# Patient Record
Sex: FEMALE | Race: WHITE | NOT HISPANIC OR LATINO | Employment: FULL TIME | URBAN - METROPOLITAN AREA
[De-identification: names, ages, dates, MRNs, and addresses within clinical notes are randomized per-mention and may not be internally consistent; named-entity substitution may affect disease eponyms.]

---

## 2018-08-15 ENCOUNTER — TELEPHONE (OUTPATIENT)
Dept: OBSTETRICS AND GYNECOLOGY | Facility: CLINIC | Age: 33
End: 2018-08-15

## 2018-08-15 NOTE — TELEPHONE ENCOUNTER
----- Message from Cynthia Garner MA sent at 8/15/2018  3:19 PM CDT -----  Contact: sima Amador  MRN: [id number]  Home Phone      [phone]  Work Phone      Not on file.  Mobile          [phone]    Patient Care Team:  Go Corrigan MD as PCP - General (Family Medicine)  OB? Yes, Unknown  What phone number can you be reached at?   [phone]  Message:   Stated is currently 6 weeks pregnant and is starting to have cramping and spotting today.

## 2018-08-15 NOTE — TELEPHONE ENCOUNTER
Pt called stating she is approximately 6 weeks pregnant having some mild cramping, and when she wipes there is a brownish discharge. Pt was advised to hydrate, rest, and take Tylenol if needed. Pt has appt scheduled for tomorrow

## 2018-08-16 ENCOUNTER — LAB VISIT (OUTPATIENT)
Dept: LAB | Facility: HOSPITAL | Age: 33
End: 2018-08-16
Attending: OBSTETRICS & GYNECOLOGY
Payer: COMMERCIAL

## 2018-08-16 ENCOUNTER — PROCEDURE VISIT (OUTPATIENT)
Dept: OBSTETRICS AND GYNECOLOGY | Facility: CLINIC | Age: 33
End: 2018-08-16
Payer: COMMERCIAL

## 2018-08-16 ENCOUNTER — OFFICE VISIT (OUTPATIENT)
Dept: OBSTETRICS AND GYNECOLOGY | Facility: CLINIC | Age: 33
End: 2018-08-16
Payer: COMMERCIAL

## 2018-08-16 DIAGNOSIS — N91.2 AMENORRHEA: Primary | ICD-10-CM

## 2018-08-16 DIAGNOSIS — E07.9 THYROID DISEASE: ICD-10-CM

## 2018-08-16 DIAGNOSIS — N91.2 AMENORRHEA: ICD-10-CM

## 2018-08-16 DIAGNOSIS — Z12.4 CERVICAL CANCER SCREENING: ICD-10-CM

## 2018-08-16 DIAGNOSIS — Z32.01 POSITIVE PREGNANCY TEST: ICD-10-CM

## 2018-08-16 DIAGNOSIS — Z11.3 SCREEN FOR STD (SEXUALLY TRANSMITTED DISEASE): ICD-10-CM

## 2018-08-16 LAB
ABO + RH BLD: NORMAL
ALBUMIN SERPL BCP-MCNC: 3.9 G/DL
ALP SERPL-CCNC: 83 U/L
ALT SERPL W/O P-5'-P-CCNC: 34 U/L
ANION GAP SERPL CALC-SCNC: 9 MMOL/L
AST SERPL-CCNC: 20 U/L
B-HCG UR QL: POSITIVE
BASOPHILS # BLD AUTO: 0.08 K/UL
BASOPHILS NFR BLD: 0.8 %
BILIRUB SERPL-MCNC: 0.4 MG/DL
BLD GP AB SCN CELLS X3 SERPL QL: NORMAL
BUN SERPL-MCNC: 9 MG/DL
CALCIUM SERPL-MCNC: 9.7 MG/DL
CHLORIDE SERPL-SCNC: 108 MMOL/L
CO2 SERPL-SCNC: 21 MMOL/L
CREAT SERPL-MCNC: 0.7 MG/DL
CTP QC/QA: YES
DIFFERENTIAL METHOD: NORMAL
EOSINOPHIL # BLD AUTO: 0.2 K/UL
EOSINOPHIL NFR BLD: 1.7 %
ERYTHROCYTE [DISTWIDTH] IN BLOOD BY AUTOMATED COUNT: 13.9 %
EST. GFR  (AFRICAN AMERICAN): >60 ML/MIN/1.73 M^2
EST. GFR  (NON AFRICAN AMERICAN): >60 ML/MIN/1.73 M^2
GLUCOSE SERPL-MCNC: 92 MG/DL
HCG INTACT+B SERPL-ACNC: NORMAL MIU/ML
HCT VFR BLD AUTO: 41.3 %
HGB BLD-MCNC: 13.7 G/DL
LYMPHOCYTES # BLD AUTO: 2.3 K/UL
LYMPHOCYTES NFR BLD: 23.3 %
MCH RBC QN AUTO: 29.1 PG
MCHC RBC AUTO-ENTMCNC: 33.2 G/DL
MCV RBC AUTO: 88 FL
MONOCYTES # BLD AUTO: 0.7 K/UL
MONOCYTES NFR BLD: 6.6 %
NEUTROPHILS # BLD AUTO: 6.7 K/UL
NEUTROPHILS NFR BLD: 67.6 %
PLATELET # BLD AUTO: 274 K/UL
PMV BLD AUTO: 11.9 FL
POTASSIUM SERPL-SCNC: 4 MMOL/L
PROT SERPL-MCNC: 7.4 G/DL
RBC # BLD AUTO: 4.71 M/UL
SODIUM SERPL-SCNC: 138 MMOL/L
T4 FREE SERPL-MCNC: 0.79 NG/DL
TSH SERPL DL<=0.005 MIU/L-ACNC: 11.91 UIU/ML
WBC # BLD AUTO: 9.92 K/UL

## 2018-08-16 PROCEDURE — 86850 RBC ANTIBODY SCREEN: CPT

## 2018-08-16 PROCEDURE — 85025 COMPLETE CBC W/AUTO DIFF WBC: CPT

## 2018-08-16 PROCEDURE — 87624 HPV HI-RISK TYP POOLED RSLT: CPT

## 2018-08-16 PROCEDURE — 81025 URINE PREGNANCY TEST: CPT | Mod: S$GLB,,, | Performed by: OBSTETRICS & GYNECOLOGY

## 2018-08-16 PROCEDURE — 99204 OFFICE O/P NEW MOD 45 MIN: CPT | Mod: 25,S$GLB,, | Performed by: OBSTETRICS & GYNECOLOGY

## 2018-08-16 PROCEDURE — 86592 SYPHILIS TEST NON-TREP QUAL: CPT

## 2018-08-16 PROCEDURE — 84702 CHORIONIC GONADOTROPIN TEST: CPT

## 2018-08-16 PROCEDURE — 81220 CFTR GENE COM VARIANTS: CPT

## 2018-08-16 PROCEDURE — 86703 HIV-1/HIV-2 1 RESULT ANTBDY: CPT

## 2018-08-16 PROCEDURE — 99999 PR PBB SHADOW E&M-EST. PATIENT-LVL I: CPT | Mod: PBBFAC,,, | Performed by: OBSTETRICS & GYNECOLOGY

## 2018-08-16 PROCEDURE — 87340 HEPATITIS B SURFACE AG IA: CPT

## 2018-08-16 PROCEDURE — 84439 ASSAY OF FREE THYROXINE: CPT

## 2018-08-16 PROCEDURE — 76817 TRANSVAGINAL US OBSTETRIC: CPT | Mod: S$GLB,,, | Performed by: OBSTETRICS & GYNECOLOGY

## 2018-08-16 PROCEDURE — 36415 COLL VENOUS BLD VENIPUNCTURE: CPT

## 2018-08-16 PROCEDURE — 87491 CHLMYD TRACH DNA AMP PROBE: CPT

## 2018-08-16 PROCEDURE — 80053 COMPREHEN METABOLIC PANEL: CPT

## 2018-08-16 PROCEDURE — 88175 CYTOPATH C/V AUTO FLUID REDO: CPT

## 2018-08-16 PROCEDURE — 84443 ASSAY THYROID STIM HORMONE: CPT

## 2018-08-16 PROCEDURE — 86762 RUBELLA ANTIBODY: CPT

## 2018-08-16 RX ORDER — THYROID, PORCINE 90 MG/1
TABLET ORAL
COMMUNITY
Start: 2018-05-17 | End: 2018-11-08

## 2018-08-16 NOTE — PROGRESS NOTES
Subjective:   Patient ID: Deneen Amador is a 32 y.o. y.o. female.     Chief Complaint: Missed Menses       History of Present Illness:    Deneen presents today complaining of amenorrhea. Patient's last menstrual period was 2018.. Prior menstrual cycles have been regular. She reports no other symptoms. UPT is positive.     Patient reports first pregnancy was  with baby measuring 9 pounds 11 ounces and caused pelvic floor issues. Secondary pregnancy primary CD weighing 9 pounds 8 ounces. Patient reports she has lichen sclerosis and has issues with her episiotomy are now. She also reports she has psoriasis and is concerned about her flares around her CS scar.     She has thyroid disease but is currently on no medication.     History reviewed. No pertinent past medical history.  Past Surgical History:   Procedure Laterality Date     SECTION      x1    THYROIDECTOMY, PARTIAL       Social History     Socioeconomic History    Marital status:      Spouse name: None    Number of children: None    Years of education: None    Highest education level: None   Social Needs    Financial resource strain: None    Food insecurity - worry: None    Food insecurity - inability: None    Transportation needs - medical: None    Transportation needs - non-medical: None   Occupational History    None   Tobacco Use    Smoking status: Never Smoker    Smokeless tobacco: Never Used   Substance and Sexual Activity    Alcohol use: No     Frequency: Never    Drug use: No    Sexual activity: Yes     Partners: Male     Birth control/protection: None     Comment:    Other Topics Concern    None   Social History Narrative    None     Family History   Problem Relation Age of Onset    Breast cancer Neg Hx     Colon cancer Neg Hx     Ovarian cancer Neg Hx      OB History    Para Term  AB Living   2 2 2     2   SAB TAB Ectopic Multiple Live Births           2      # Outcome Date GA Lbr  Tru/2nd Weight Sex Delivery Anes PTL Lv   2 Term      CS-LTranv   JASIEL   1 Term      Vag-Spont   JASIEL            ROS:   Constitutional: Negative for chills, fever and weight loss.   HENT: Negative for congestion, ear discharge, hearing loss and nosebleeds.   Eyes: Negative for blurred vision and double vision.   Respiratory: Negative for cough, hemoptysis, sputum production and shortness of breath.   Cardiovascular: Negative for chest pain, palpitations and orthopnea.   Gastrointestinal: Negative for abdominal pain, heartburn, nausea and vomiting.   Genitourinary: Negative for dysuria, frequency, hematuria and urgency.   Musculoskeletal: Negative for back pain, myalgias and neck pain.   Skin: Negative for itching and rash.   Neurological: Negative for dizziness, tingling, tremors and headaches.   Endo/Heme/Allergies: Negative for environmental allergies and polydipsia. Does not bruise/bleed easily.   Psychiatric/Behavioral: Negative for depression, hallucinations and substance abuse. The patient is not nervous/anxious.       Objective:   Vital Signs:  There were no vitals filed for this visit.      Physical Exam:  General:  alert, cooperative, appears stated age   Skin:  Skin color, texture, turgor normal. No rashes or lesions   HEENT:  conjunctivae/corneas clear. PERRL.   Neck: supple, trachea midline, no adenopathy or thyromegally   Respiratory:  clear to auscultation bilaterally   Heart:  regular rate and rhythm, S1, S2 normal, no murmur, click, rub or gallop   Breasts:  no discharge, erythema, or tenderness   Abdomen:  normal findings: bowel sounds normal, no masses palpable, no organomegaly and soft, non-tender   Pelvis: External genitalia: normal general appearance  Urinary system: urethral meatus normal, bladder nontender  Vaginal: normal mucosa without prolapse or lesions  Cervix: normal appearance  Uterus: normal size, shape, position  Adnexa: normal size, nontender bilaterally   Extremities: Normal ROM; no  edema, no cyanosis   Neurologial: Normal strength and tone. No focal numbness or weakness. Reflexes 2+ and equal.   Psychiatric: normal mood, speech, dress, and thought processes     Assessment:      1. Amenorrhea          Plan:      Amenorrhea  -     POCT urine pregnancy  -     hCG, quantitative; Future; Expected date: 08/16/2018  -      OB/GYN Procedure ([facility]) - Extended List - Future; Future    Positive pregnancy test  -     Comprehensive metabolic panel; Future; Expected date: 08/16/2018  -     Type & Screen - Ob Profile; Future; Expected date: 08/16/2018  -     CBC auto differential; Future; Expected date: 08/16/2018  -     HIV-1 and HIV-2 antibodies; Future; Expected date: 08/16/2018  -     Hepatitis B surface antigen; Future; Expected date: 08/16/2018  -     Rubella antibody, IgG; Future; Expected date: 08/16/2018  -     RPR; Future; Expected date: 08/16/2018  -     Cystic Fibrosis Mutation Panel; Future; Expected date: 08/16/2018    Screen for STD (sexually transmitted disease)  -     C. trachomatis/N. gonorrhoeae by AMP DNA    Cervical cancer screening  -     Liquid-based pap smear, screening  -     HPV High Risk Genotypes, PCR    Thyroid disease  -     TSH; Future; Expected date: 08/16/2018      Dating scan  Labs  Pap/HPV cotesting  GC/CT  RTC in 4 weeks    Patient was counseled today on proper weight gain based on the [employer]'s recommendations based on her pre-pregnancy weight. Discussed foods to avoid in pregnancy (i.e. sushi, fish that are high in mercury, deli meat, and unpasteurized cheeses). Discussed prenatal vitamin options (i.e. stool softener, DHA). She was also counseled on safe, healthy behavior as well as medications safe in pregnancy.

## 2018-08-17 PROBLEM — O34.10 UTERINE FIBROID IN PREGNANCY: Status: ACTIVE | Noted: 2018-08-17

## 2018-08-17 PROBLEM — D25.9 UTERINE FIBROID IN PREGNANCY: Status: ACTIVE | Noted: 2018-08-17

## 2018-08-17 LAB
HBV SURFACE AG SERPL QL IA: NEGATIVE
HIV 1+2 AB+HIV1 P24 AG SERPL QL IA: NEGATIVE
RPR SER QL: NORMAL
RUBV IGG SER-ACNC: 19 IU/ML
RUBV IGG SER-IMP: REACTIVE

## 2018-08-19 LAB
C TRACH DNA SPEC QL NAA+PROBE: NOT DETECTED
N GONORRHOEA DNA SPEC QL NAA+PROBE: NOT DETECTED

## 2018-08-20 ENCOUNTER — TELEPHONE (OUTPATIENT)
Dept: OBSTETRICS AND GYNECOLOGY | Facility: CLINIC | Age: 33
End: 2018-08-20

## 2018-08-20 RX ORDER — NITROFURANTOIN 25; 75 MG/1; MG/1
100 CAPSULE ORAL 2 TIMES DAILY
Qty: 14 CAPSULE | Refills: 0 | Status: SHIPPED | OUTPATIENT
Start: 2018-08-20 | End: 2018-08-27

## 2018-08-20 NOTE — TELEPHONE ENCOUNTER
----- Message from Cynthia Garner MA sent at 8/20/2018  8:09 AM CDT -----  Contact: sima Amador  MRN: [id number]  Home Phone      [phone]  Work Phone      Not on file.  Mobile          [phone]    Patient Care Team:  Go Corrigan MD as PCP - General (Family Medicine)  OB? Yes   What phone number can you be reached at?   [phone]  Message:   Needs to discuss uti she has.

## 2018-08-20 NOTE — TELEPHONE ENCOUNTER
Rx for macrobid sent to patient's pharmacy.  Advise her to take that and not AZO.  Also, she should increase her PO hydration.

## 2018-08-20 NOTE — TELEPHONE ENCOUNTER
Pt stated she started with UTI symptoms today. C/O frequency, pressure, and odor. Pt would like to know if she can use Azo or if she could get something sent to the pharmacy. Pt is approximately 6wks

## 2018-08-21 LAB — CFTR MUT ANL BLD/T: NORMAL

## 2018-08-23 LAB
HPV HR 12 DNA CVX QL NAA+PROBE: NEGATIVE
HPV16 AG SPEC QL: NEGATIVE
HPV18 DNA SPEC QL NAA+PROBE: NEGATIVE

## 2018-09-13 ENCOUNTER — INITIAL PRENATAL (OUTPATIENT)
Dept: OBSTETRICS AND GYNECOLOGY | Facility: CLINIC | Age: 33
End: 2018-09-13
Payer: COMMERCIAL

## 2018-09-13 ENCOUNTER — TELEPHONE (OUTPATIENT)
Dept: OBSTETRICS AND GYNECOLOGY | Facility: CLINIC | Age: 33
End: 2018-09-13

## 2018-09-13 VITALS — SYSTOLIC BLOOD PRESSURE: 120 MMHG | WEIGHT: 283 LBS | HEART RATE: 80 BPM | DIASTOLIC BLOOD PRESSURE: 80 MMHG

## 2018-09-13 DIAGNOSIS — O34.10 UTERINE FIBROID IN PREGNANCY: ICD-10-CM

## 2018-09-13 DIAGNOSIS — Z34.81 ENCOUNTER FOR SUPERVISION OF OTHER NORMAL PREGNANCY IN FIRST TRIMESTER: Primary | ICD-10-CM

## 2018-09-13 DIAGNOSIS — Z98.891 HISTORY OF CESAREAN DELIVERY: ICD-10-CM

## 2018-09-13 DIAGNOSIS — Z12.31 ENCOUNTER FOR SCREENING MAMMOGRAM FOR BREAST CANCER: Primary | ICD-10-CM

## 2018-09-13 DIAGNOSIS — O09.521 AMA (ADVANCED MATERNAL AGE) MULTIGRAVIDA 35+, FIRST TRIMESTER: ICD-10-CM

## 2018-09-13 DIAGNOSIS — Z3A.10 10 WEEKS GESTATION OF PREGNANCY: ICD-10-CM

## 2018-09-13 DIAGNOSIS — D25.9 UTERINE FIBROID IN PREGNANCY: ICD-10-CM

## 2018-09-13 PROBLEM — Z34.91 ENCOUNTER FOR SUPERVISION OF NORMAL PREGNANCY IN FIRST TRIMESTER: Status: ACTIVE | Noted: 2018-09-13

## 2018-09-13 PROCEDURE — 0500F INITIAL PRENATAL CARE VISIT: CPT | Mod: S$GLB,,, | Performed by: OBSTETRICS & GYNECOLOGY

## 2018-09-13 PROCEDURE — 99999 PR PBB SHADOW E&M-EST. PATIENT-LVL II: CPT | Mod: PBBFAC,,, | Performed by: OBSTETRICS & GYNECOLOGY

## 2018-09-13 NOTE — PROGRESS NOTES
Reviewed prenatal labs with patient. Reviewed dating criteria. Discussed proper nutrition and weight gain in pregnancy. No vaginal bleeding or cramping noted. SAB precautions discussed. Patient to RTC in 4 weeks.     Discussed fibroid on ultrasound; will send to [facility] for anatomy scan. Discussed cffDNA for AMA status. Patient unsure at this time.     Initial ob packet supplied to patient. Contents supplied and discussed include medications safe in pregnancy, pregnancy A to Z, class schedule, pregnancy checklist, car seat safety, and handout on skin to skin and breastfeeding. Coeffective mychal card supplied and discussed.

## 2018-09-13 NOTE — TELEPHONE ENCOUNTER
----- Message from Cynthia Garner MA sent at 9/13/2018  9:29 AM CDT -----  Contact: sima Amador  MRN: [id number]  Home Phone      [phone]  Work Phone      Not on file.  Mobile          [phone]    Patient Care Team:  Go Corrigan MD as PCP - General (Family Medicine)  Josiane Mills MD (Obstetrics and Gynecology)  OB? No  What phone number can you be reached at?   [phone]  Message:   Please link mammo orders to appt 11/23/2018.  Thanks!

## 2018-10-11 ENCOUNTER — ROUTINE PRENATAL (OUTPATIENT)
Dept: OBSTETRICS AND GYNECOLOGY | Facility: CLINIC | Age: 33
End: 2018-10-11
Payer: COMMERCIAL

## 2018-10-11 ENCOUNTER — IMMUNIZATION (OUTPATIENT)
Dept: OBSTETRICS AND GYNECOLOGY | Facility: CLINIC | Age: 33
End: 2018-10-11
Payer: COMMERCIAL

## 2018-10-11 VITALS — WEIGHT: 286 LBS | HEART RATE: 80 BPM | DIASTOLIC BLOOD PRESSURE: 84 MMHG | SYSTOLIC BLOOD PRESSURE: 120 MMHG

## 2018-10-11 DIAGNOSIS — Z34.82 ENCOUNTER FOR SUPERVISION OF OTHER NORMAL PREGNANCY IN SECOND TRIMESTER: Primary | ICD-10-CM

## 2018-10-11 DIAGNOSIS — D25.9 UTERINE FIBROID IN PREGNANCY: ICD-10-CM

## 2018-10-11 DIAGNOSIS — O09.522 AMA (ADVANCED MATERNAL AGE) MULTIGRAVIDA 35+, SECOND TRIMESTER: ICD-10-CM

## 2018-10-11 DIAGNOSIS — Z3A.14 14 WEEKS GESTATION OF PREGNANCY: ICD-10-CM

## 2018-10-11 DIAGNOSIS — Z98.891 HISTORY OF CESAREAN DELIVERY: ICD-10-CM

## 2018-10-11 DIAGNOSIS — Z23 NEED FOR INFLUENZA VACCINATION: Primary | ICD-10-CM

## 2018-10-11 DIAGNOSIS — O34.10 UTERINE FIBROID IN PREGNANCY: ICD-10-CM

## 2018-10-11 PROBLEM — Z34.92 ENCOUNTER FOR SUPERVISION OF NORMAL PREGNANCY IN SECOND TRIMESTER: Status: ACTIVE | Noted: 2018-09-13

## 2018-10-11 PROCEDURE — 0502F SUBSEQUENT PRENATAL CARE: CPT | Mod: S$GLB,,, | Performed by: OBSTETRICS & GYNECOLOGY

## 2018-10-11 PROCEDURE — 90686 IIV4 VACC NO PRSV 0.5 ML IM: CPT | Mod: S$GLB,,, | Performed by: OBSTETRICS & GYNECOLOGY

## 2018-10-11 PROCEDURE — 90471 IMMUNIZATION ADMIN: CPT | Mod: S$GLB,,, | Performed by: OBSTETRICS & GYNECOLOGY

## 2018-10-11 PROCEDURE — 99999 PR PBB SHADOW E&M-EST. PATIENT-LVL III: CPT | Mod: PBBFAC,,, | Performed by: OBSTETRICS & GYNECOLOGY

## 2018-10-11 RX ORDER — LEVOTHYROXINE SODIUM 50 UG/1
50 TABLET ORAL DAILY
Status: ON HOLD | COMMUNITY
End: 2019-03-29 | Stop reason: HOSPADM

## 2018-10-11 NOTE — PROGRESS NOTES
Flu Vaccine given in Left Deltoid per order. No reaction noted. Instructed patient to wait 15 minutes after injection administration. Patient verbalized understanding with no questions or concerns.

## 2018-10-11 NOTE — PROGRESS NOTES
Patient doing well. No vaginal bleeding or cramping noted. Discussed the need for an anatomy scan between 18-20 weeks; order placed with M. Desires cffDNA; ordered. Flu shot today.  RTC in 4 weeks.     Coffective counseling sheet Get Ready discussed with mother. Reinforced avoiding induction of labor unless medically indicated as well as comfort measures during labor.  Encouraged mother to download Coffective mobile mychal if she has not already done so. Mother verbalizes understanding.

## 2018-10-17 ENCOUNTER — TELEPHONE (OUTPATIENT)
Dept: OBSTETRICS AND GYNECOLOGY | Facility: CLINIC | Age: 33
End: 2018-10-17

## 2018-10-22 ENCOUNTER — TELEPHONE (OUTPATIENT)
Dept: OBSTETRICS AND GYNECOLOGY | Facility: CLINIC | Age: 33
End: 2018-10-22

## 2018-10-22 NOTE — TELEPHONE ENCOUNTER
----- Message from Marianela Casas sent at 10/22/2018  9:19 AM CDT -----  Contact: sima Amador  MRN: [id number]  Home Phone      [phone]  Work Phone      Not on file.  Mobile          [phone]    Patient Care Team:  Go Corrigan MD as PCP - General (Family Medicine)  Josiane Mills MD (Obstetrics and Gynecology)  OB? Yes, 15w5d  What phone number can you be reached at? [phone]   Message:   Patient would like her daughter Ankita to be called @ [phone] regarding her baby's gender, she states they are doing a gender reveal. Please return call.

## 2018-10-22 NOTE — TELEPHONE ENCOUNTER
Attempted to contact Ankita per pts request to notify of baby's gender. No answer, unable to leave message.

## 2018-11-08 ENCOUNTER — LAB VISIT (OUTPATIENT)
Dept: LAB | Facility: HOSPITAL | Age: 33
End: 2018-11-08
Attending: OBSTETRICS & GYNECOLOGY
Payer: COMMERCIAL

## 2018-11-08 ENCOUNTER — ROUTINE PRENATAL (OUTPATIENT)
Dept: OBSTETRICS AND GYNECOLOGY | Facility: CLINIC | Age: 33
End: 2018-11-08
Payer: COMMERCIAL

## 2018-11-08 VITALS — DIASTOLIC BLOOD PRESSURE: 76 MMHG | SYSTOLIC BLOOD PRESSURE: 121 MMHG | WEIGHT: 286 LBS | HEART RATE: 84 BPM

## 2018-11-08 DIAGNOSIS — Z98.891 HISTORY OF CESAREAN DELIVERY: ICD-10-CM

## 2018-11-08 DIAGNOSIS — Z34.82 ENCOUNTER FOR SUPERVISION OF OTHER NORMAL PREGNANCY IN SECOND TRIMESTER: ICD-10-CM

## 2018-11-08 DIAGNOSIS — O34.10 UTERINE FIBROID IN PREGNANCY: ICD-10-CM

## 2018-11-08 DIAGNOSIS — O09.522 AMA (ADVANCED MATERNAL AGE) MULTIGRAVIDA 35+, SECOND TRIMESTER: ICD-10-CM

## 2018-11-08 DIAGNOSIS — Z34.82 ENCOUNTER FOR SUPERVISION OF OTHER NORMAL PREGNANCY IN SECOND TRIMESTER: Primary | ICD-10-CM

## 2018-11-08 DIAGNOSIS — D25.9 UTERINE FIBROID IN PREGNANCY: ICD-10-CM

## 2018-11-08 PROCEDURE — 82105 ALPHA-FETOPROTEIN SERUM: CPT

## 2018-11-08 PROCEDURE — 0502F SUBSEQUENT PRENATAL CARE: CPT | Mod: S$GLB,,, | Performed by: OBSTETRICS & GYNECOLOGY

## 2018-11-08 PROCEDURE — 36415 COLL VENOUS BLD VENIPUNCTURE: CPT

## 2018-11-08 PROCEDURE — 99999 PR PBB SHADOW E&M-EST. PATIENT-LVL II: CPT | Mod: PBBFAC,,, | Performed by: OBSTETRICS & GYNECOLOGY

## 2018-11-08 NOTE — PROGRESS NOTES
Patient with no complaints. Denies vaginal bleeding or cramping.  Patient to get AFP drawn today. Anatomy scan scheduled with [facility] on 11/14. RTC in 4 weeks    Coffective counseling sheet Fall In Love discussed with mother. Reinforced immediate skin to skin, the magic first hour, importance of the first feeding and delaying routine procedures. Encouraged mother to download Coffective mobile mychal if she has not already done so. Mother verbalizes understanding.

## 2018-11-12 LAB
# FETUSES US: NORMAL
AFP INTERPRETATION: NORMAL
AFP MOM SERPL: 1.09
AFP SERPL-MCNC: 31.8 NG/ML
AFP SERPL-MCNC: NEGATIVE NG/ML
AGE AT DELIVERY: 35
GA (DAYS): 1 D
GA (WEEKS): 18 WK
GA METHOD: NORMAL
IDDM PATIENT QL: NORMAL
SMOKING STATUS FTND: NO

## 2018-11-14 ENCOUNTER — INITIAL CONSULT (OUTPATIENT)
Dept: MATERNAL FETAL MEDICINE | Facility: CLINIC | Age: 33
End: 2018-11-14
Attending: OBSTETRICS & GYNECOLOGY
Payer: COMMERCIAL

## 2018-11-14 ENCOUNTER — PROCEDURE VISIT (OUTPATIENT)
Dept: MATERNAL FETAL MEDICINE | Facility: CLINIC | Age: 33
End: 2018-11-14
Payer: COMMERCIAL

## 2018-11-14 VITALS
SYSTOLIC BLOOD PRESSURE: 118 MMHG | HEIGHT: 62 IN | DIASTOLIC BLOOD PRESSURE: 72 MMHG | BODY MASS INDEX: 52.62 KG/M2 | WEIGHT: 285.94 LBS

## 2018-11-14 DIAGNOSIS — Z98.891 HISTORY OF CESAREAN DELIVERY: ICD-10-CM

## 2018-11-14 DIAGNOSIS — O34.10 UTERINE FIBROID IN PREGNANCY: ICD-10-CM

## 2018-11-14 DIAGNOSIS — Z36.3 ANTENATAL SCREENING FOR MALFORMATION USING ULTRASONICS: ICD-10-CM

## 2018-11-14 DIAGNOSIS — Z36.89 ENCOUNTER FOR ULTRASOUND TO CHECK FETAL GROWTH: Primary | ICD-10-CM

## 2018-11-14 DIAGNOSIS — O09.522 AMA (ADVANCED MATERNAL AGE) MULTIGRAVIDA 35+, SECOND TRIMESTER: ICD-10-CM

## 2018-11-14 DIAGNOSIS — D25.9 UTERINE FIBROID IN PREGNANCY: ICD-10-CM

## 2018-11-14 PROCEDURE — 99999 PR PBB SHADOW E&M-EST. PATIENT-LVL III: CPT | Mod: PBBFAC,,, | Performed by: OBSTETRICS & GYNECOLOGY

## 2018-11-14 PROCEDURE — 99202 OFFICE O/P NEW SF 15 MIN: CPT | Mod: 25,S$GLB,, | Performed by: OBSTETRICS & GYNECOLOGY

## 2018-11-14 PROCEDURE — 3008F BODY MASS INDEX DOCD: CPT | Mod: CPTII,S$GLB,, | Performed by: OBSTETRICS & GYNECOLOGY

## 2018-11-14 PROCEDURE — 76811 OB US DETAILED SNGL FETUS: CPT | Mod: S$GLB,,, | Performed by: OBSTETRICS & GYNECOLOGY

## 2018-11-14 NOTE — LETTER
November 16, 2018      Josiane Mills MD  [address] [address]           [facility]  [address]  [facility] [address]  Phone: [phone]          Patient: Deneen Amador   MR Number: [id number]   YOB: 1985   Date of Visit: 11/14/2018       Dear Dr. Josiane Mills:    Thank you for referring Deneen Amador to me for evaluation. Attached you will find relevant portions of my assessment and plan of care.    If you have questions, please do not hesitate to call me. I look forward to following Deneen Amador along with you.    Sincerely,    Cynthia Sanders MD    Enclosure  CC:  No Recipients    If you would like to receive this communication electronically, please contact [email or web address] or [phone] to request more information on [employer] Link access.    For providers and/or their staff who would like to refer a patient to [facility], please contact us through our one-stop-shop provider referral line, [facility] [place], at [phone].    If you feel you have received this communication in error or would no longer like to receive these types of communications, please e-mail [email or web address]

## 2018-11-21 ENCOUNTER — TELEPHONE (OUTPATIENT)
Dept: OBSTETRICS AND GYNECOLOGY | Facility: CLINIC | Age: 33
End: 2018-11-21

## 2018-11-21 NOTE — TELEPHONE ENCOUNTER
20 week ob requesting OTC medication for a cough. States she has been experiencing a non-productive cough for 2 days. Denies productive cough, fever, nausea, vomiting, or any other symptoms. Patient instructed per A-Z pregnancy guide the following are safe: Vicks Nature Fusion, Delsym 12 hour extended release suspension, Coricidin HfPB chest congestion and cough, or Mucinex. Patient see PCP if symptoms persists or worsen. Patient verbalized understanding with no questions or concerns.

## 2018-11-21 NOTE — TELEPHONE ENCOUNTER
----- Message from Cynthia Garner MA sent at 11/21/2018  8:41 AM CST -----  Contact: self  Deneen Amador  MRN: [id number]  Home Phone      [phone]  Work Phone      Not on file.  Mobile          [phone]    Patient Care Team:  Go Corrigan MD as PCP - General (Family Medicine)  OB? Yes, 20w0d  What phone number can you be reached at?  [phone]  Message:   Would like to know what can she take for a cough while pregnant.

## 2018-12-06 ENCOUNTER — ROUTINE PRENATAL (OUTPATIENT)
Dept: OBSTETRICS AND GYNECOLOGY | Facility: CLINIC | Age: 33
End: 2018-12-06
Payer: COMMERCIAL

## 2018-12-06 ENCOUNTER — LAB VISIT (OUTPATIENT)
Dept: LAB | Facility: HOSPITAL | Age: 33
End: 2018-12-06
Attending: OBSTETRICS & GYNECOLOGY
Payer: COMMERCIAL

## 2018-12-06 VITALS
SYSTOLIC BLOOD PRESSURE: 118 MMHG | DIASTOLIC BLOOD PRESSURE: 80 MMHG | HEART RATE: 88 BPM | WEIGHT: 291 LBS | BODY MASS INDEX: 53.22 KG/M2

## 2018-12-06 DIAGNOSIS — O09.522 AMA (ADVANCED MATERNAL AGE) MULTIGRAVIDA 35+, SECOND TRIMESTER: ICD-10-CM

## 2018-12-06 DIAGNOSIS — Z3A.22 22 WEEKS GESTATION OF PREGNANCY: ICD-10-CM

## 2018-12-06 DIAGNOSIS — Z34.82 ENCOUNTER FOR SUPERVISION OF OTHER NORMAL PREGNANCY IN SECOND TRIMESTER: Primary | ICD-10-CM

## 2018-12-06 DIAGNOSIS — D25.9 UTERINE FIBROID IN PREGNANCY: ICD-10-CM

## 2018-12-06 DIAGNOSIS — Z13.1 ENCOUNTER FOR SCREENING FOR DIABETES MELLITUS: ICD-10-CM

## 2018-12-06 DIAGNOSIS — O34.10 UTERINE FIBROID IN PREGNANCY: ICD-10-CM

## 2018-12-06 DIAGNOSIS — Z98.891 HISTORY OF CESAREAN DELIVERY: ICD-10-CM

## 2018-12-06 LAB — GLUCOSE SERPL-MCNC: 93 MG/DL

## 2018-12-06 PROCEDURE — 99999 PR PBB SHADOW E&M-EST. PATIENT-LVL II: CPT | Mod: PBBFAC,,, | Performed by: OBSTETRICS & GYNECOLOGY

## 2018-12-06 PROCEDURE — 36415 COLL VENOUS BLD VENIPUNCTURE: CPT

## 2018-12-06 PROCEDURE — 82950 GLUCOSE TEST: CPT

## 2018-12-06 PROCEDURE — 0502F SUBSEQUENT PRENATAL CARE: CPT | Mod: S$GLB,,, | Performed by: OBSTETRICS & GYNECOLOGY

## 2018-12-06 NOTE — PROGRESS NOTES
Patient with no complaints. Denies vaginal bleeding or cramping.  Good FM. Discussed with patient having glucose testing for gestational diabetes; will order today. RTC in 4 weeks.     Coffective counseling sheet Learn Your Baby and Protect Breastfeeding discussed with mother. Instructed regarding feeding cues and methods to calm baby. Encouraged mother to download Coffective mobile mychal if she has not already done so.  Mother verbalized understanding.

## 2018-12-19 ENCOUNTER — INITIAL CONSULT (OUTPATIENT)
Dept: MATERNAL FETAL MEDICINE | Facility: CLINIC | Age: 33
End: 2018-12-19
Attending: OBSTETRICS & GYNECOLOGY
Payer: COMMERCIAL

## 2018-12-19 ENCOUNTER — PROCEDURE VISIT (OUTPATIENT)
Dept: MATERNAL FETAL MEDICINE | Facility: CLINIC | Age: 33
End: 2018-12-19
Payer: COMMERCIAL

## 2018-12-19 VITALS
SYSTOLIC BLOOD PRESSURE: 118 MMHG | BODY MASS INDEX: 53.43 KG/M2 | WEIGHT: 292.13 LBS | DIASTOLIC BLOOD PRESSURE: 88 MMHG

## 2018-12-19 DIAGNOSIS — O09.522 AMA (ADVANCED MATERNAL AGE) MULTIGRAVIDA 35+, SECOND TRIMESTER: ICD-10-CM

## 2018-12-19 DIAGNOSIS — Z36.89 ENCOUNTER FOR ULTRASOUND TO CHECK FETAL GROWTH: ICD-10-CM

## 2018-12-19 DIAGNOSIS — D25.9 UTERINE FIBROID IN PREGNANCY: ICD-10-CM

## 2018-12-19 DIAGNOSIS — O99.891 CURRENT MATERNAL CONDITION AFFECTING PREGNANCY: ICD-10-CM

## 2018-12-19 DIAGNOSIS — Z36.3 ANTENATAL SCREENING FOR MALFORMATION USING ULTRASONICS: ICD-10-CM

## 2018-12-19 DIAGNOSIS — O34.10 UTERINE FIBROID IN PREGNANCY: ICD-10-CM

## 2018-12-19 PROCEDURE — 99499 UNLISTED E&M SERVICE: CPT | Mod: S$GLB,,, | Performed by: OBSTETRICS & GYNECOLOGY

## 2018-12-19 PROCEDURE — 99999 PR PBB SHADOW E&M-EST. PATIENT-LVL II: CPT | Mod: PBBFAC,,, | Performed by: OBSTETRICS & GYNECOLOGY

## 2018-12-19 PROCEDURE — 76816 OB US FOLLOW-UP PER FETUS: CPT | Mod: S$GLB,,, | Performed by: OBSTETRICS & GYNECOLOGY

## 2018-12-19 NOTE — LETTER
December 21, 2018      Josiane Mills MD  [address] [address]           [facility]  [address]  [facility] [address]  Phone: [phone]          Patient: Deneen Amador   MR Number: [id number]   YOB: 1985   Date of Visit: 12/19/2018       Dear Dr. Josiane Mills:    Thank you for referring Deneen Amador to me for evaluation. Attached you will find relevant portions of my assessment and plan of care.    If you have questions, please do not hesitate to call me. I look forward to following Deneen Amador along with you.    Sincerely,    Cynthia Sanders MD    Enclosure  CC:  No Recipients    If you would like to receive this communication electronically, please contact [email or web address] or [phone] to request more information on [employer] Link access.    For providers and/or their staff who would like to refer a patient to [facility], please contact us through our one-stop-shop provider referral line, [facility], at [phone].    If you feel you have received this communication in error or would no longer like to receive these types of communications, please e-mail [email or web address]

## 2019-01-02 ENCOUNTER — TELEPHONE (OUTPATIENT)
Dept: OBSTETRICS AND GYNECOLOGY | Facility: CLINIC | Age: 34
End: 2019-01-02

## 2019-01-02 ENCOUNTER — TELEPHONE (OUTPATIENT)
Dept: PEDIATRIC CARDIOLOGY | Facility: CLINIC | Age: 34
End: 2019-01-02

## 2019-01-02 ENCOUNTER — ROUTINE PRENATAL (OUTPATIENT)
Dept: OBSTETRICS AND GYNECOLOGY | Facility: CLINIC | Age: 34
End: 2019-01-02
Payer: COMMERCIAL

## 2019-01-02 VITALS
WEIGHT: 293 LBS | BODY MASS INDEX: 53.96 KG/M2 | SYSTOLIC BLOOD PRESSURE: 116 MMHG | HEART RATE: 82 BPM | DIASTOLIC BLOOD PRESSURE: 80 MMHG

## 2019-01-02 DIAGNOSIS — O09.522 AMA (ADVANCED MATERNAL AGE) MULTIGRAVIDA 35+, SECOND TRIMESTER: ICD-10-CM

## 2019-01-02 DIAGNOSIS — O34.10 UTERINE FIBROID IN PREGNANCY: ICD-10-CM

## 2019-01-02 DIAGNOSIS — Z3A.26 26 WEEKS GESTATION OF PREGNANCY: ICD-10-CM

## 2019-01-02 DIAGNOSIS — Z98.891 HISTORY OF CESAREAN DELIVERY: ICD-10-CM

## 2019-01-02 DIAGNOSIS — D25.9 UTERINE FIBROID IN PREGNANCY: ICD-10-CM

## 2019-01-02 DIAGNOSIS — M54.30 SCIATIC NERVE PAIN, UNSPECIFIED LATERALITY: ICD-10-CM

## 2019-01-02 DIAGNOSIS — Z34.82 ENCOUNTER FOR SUPERVISION OF OTHER NORMAL PREGNANCY IN SECOND TRIMESTER: Primary | ICD-10-CM

## 2019-01-02 PROCEDURE — 99999 PR PBB SHADOW E&M-EST. PATIENT-LVL III: CPT | Mod: PBBFAC,,, | Performed by: OBSTETRICS & GYNECOLOGY

## 2019-01-02 PROCEDURE — 0502F SUBSEQUENT PRENATAL CARE: CPT | Mod: S$GLB,,, | Performed by: OBSTETRICS & GYNECOLOGY

## 2019-01-02 PROCEDURE — 99999 PR PBB SHADOW E&M-EST. PATIENT-LVL III: ICD-10-PCS | Mod: PBBFAC,,, | Performed by: OBSTETRICS & GYNECOLOGY

## 2019-01-02 PROCEDURE — 0502F PR SUBSEQUENT PRENATAL CARE: ICD-10-PCS | Mod: S$GLB,,, | Performed by: OBSTETRICS & GYNECOLOGY

## 2019-01-02 RX ORDER — NYSTATIN 100000 U/G
CREAM TOPICAL 2 TIMES DAILY
Qty: 30 G | Refills: 0 | Status: SHIPPED | OUTPATIENT
Start: 2019-01-02 | End: 2019-02-04 | Stop reason: SDUPTHER

## 2019-01-02 NOTE — PROGRESS NOTES
Patient with no complaints. Denies vaginal bleeding or contractions. Good FM.   labor precautions discussed with patient. Will need repeat CBC/GTT at next visit. MFM follow up scheduled on .  RTC in 2 weeks.    Coffective counseling sheet Nourish discussed with mother. Reinforced basic breastfeeding position and latch as well as proper hand expression technique and avoidance of artificial nipples and formula unless medically indicated. Encouraged mother to download Coffective mobile mychal if she has not already done so.  Mother verbalizes understanding.

## 2019-01-02 NOTE — TELEPHONE ENCOUNTER
PT referral and demographics faxed to [facility]. Fax confirmation received. Referral scanned to chart.

## 2019-01-04 ENCOUNTER — TELEPHONE (OUTPATIENT)
Dept: OBSTETRICS AND GYNECOLOGY | Facility: CLINIC | Age: 34
End: 2019-01-04

## 2019-01-04 NOTE — TELEPHONE ENCOUNTER
Physical Therapy Plan of Care received from Tran at [facility]. Placed in Dr. Jarvis folder for review and signature. Fax back to Tran at [phone]

## 2019-01-16 ENCOUNTER — LAB VISIT (OUTPATIENT)
Dept: LAB | Facility: HOSPITAL | Age: 34
End: 2019-01-16
Attending: OBSTETRICS & GYNECOLOGY
Payer: COMMERCIAL

## 2019-01-16 ENCOUNTER — ROUTINE PRENATAL (OUTPATIENT)
Dept: OBSTETRICS AND GYNECOLOGY | Facility: CLINIC | Age: 34
End: 2019-01-16
Payer: COMMERCIAL

## 2019-01-16 VITALS
HEART RATE: 80 BPM | DIASTOLIC BLOOD PRESSURE: 68 MMHG | SYSTOLIC BLOOD PRESSURE: 110 MMHG | WEIGHT: 293 LBS | BODY MASS INDEX: 53.96 KG/M2

## 2019-01-16 DIAGNOSIS — Z98.891 HISTORY OF CESAREAN DELIVERY: ICD-10-CM

## 2019-01-16 DIAGNOSIS — Z23 NEED FOR TDAP VACCINATION: ICD-10-CM

## 2019-01-16 DIAGNOSIS — Z34.83 ENCOUNTER FOR SUPERVISION OF OTHER NORMAL PREGNANCY IN THIRD TRIMESTER: Primary | ICD-10-CM

## 2019-01-16 DIAGNOSIS — Z13.1 ENCOUNTER FOR SCREENING FOR DIABETES MELLITUS: ICD-10-CM

## 2019-01-16 DIAGNOSIS — Z34.83 ENCOUNTER FOR SUPERVISION OF OTHER NORMAL PREGNANCY IN THIRD TRIMESTER: ICD-10-CM

## 2019-01-16 DIAGNOSIS — O09.522 AMA (ADVANCED MATERNAL AGE) MULTIGRAVIDA 35+, SECOND TRIMESTER: ICD-10-CM

## 2019-01-16 DIAGNOSIS — O34.10 UTERINE FIBROID IN PREGNANCY: ICD-10-CM

## 2019-01-16 DIAGNOSIS — D25.9 UTERINE FIBROID IN PREGNANCY: ICD-10-CM

## 2019-01-16 DIAGNOSIS — Z3A.28 28 WEEKS GESTATION OF PREGNANCY: ICD-10-CM

## 2019-01-16 PROBLEM — Z34.93 ENCOUNTER FOR SUPERVISION OF NORMAL PREGNANCY IN THIRD TRIMESTER: Status: ACTIVE | Noted: 2018-09-13

## 2019-01-16 LAB
BASOPHILS # BLD AUTO: 0.03 K/UL
BASOPHILS NFR BLD: 0.2 %
DIFFERENTIAL METHOD: ABNORMAL
EOSINOPHIL # BLD AUTO: 0.2 K/UL
EOSINOPHIL NFR BLD: 1.4 %
ERYTHROCYTE [DISTWIDTH] IN BLOOD BY AUTOMATED COUNT: 13.8 %
GLUCOSE SERPL-MCNC: 116 MG/DL
HCT VFR BLD AUTO: 37.3 %
HGB BLD-MCNC: 12.1 G/DL
LYMPHOCYTES # BLD AUTO: 2.3 K/UL
LYMPHOCYTES NFR BLD: 17.7 %
MCH RBC QN AUTO: 28.4 PG
MCHC RBC AUTO-ENTMCNC: 32.4 G/DL
MCV RBC AUTO: 88 FL
MONOCYTES # BLD AUTO: 0.5 K/UL
MONOCYTES NFR BLD: 4.1 %
NEUTROPHILS # BLD AUTO: 10.1 K/UL
NEUTROPHILS NFR BLD: 76.6 %
PLATELET # BLD AUTO: 272 K/UL
PMV BLD AUTO: 11.3 FL
RBC # BLD AUTO: 4.26 M/UL
WBC # BLD AUTO: 13.2 K/UL

## 2019-01-16 PROCEDURE — 99999 PR PBB SHADOW E&M-EST. PATIENT-LVL II: ICD-10-PCS | Mod: PBBFAC,,, | Performed by: OBSTETRICS & GYNECOLOGY

## 2019-01-16 PROCEDURE — 0502F PR SUBSEQUENT PRENATAL CARE: ICD-10-PCS | Mod: S$GLB,,, | Performed by: OBSTETRICS & GYNECOLOGY

## 2019-01-16 PROCEDURE — 90715 TDAP VACCINE GREATER THAN OR EQUAL TO 7YO IM: ICD-10-PCS | Mod: S$GLB,,, | Performed by: OBSTETRICS & GYNECOLOGY

## 2019-01-16 PROCEDURE — 90715 TDAP VACCINE 7 YRS/> IM: CPT | Mod: S$GLB,,, | Performed by: OBSTETRICS & GYNECOLOGY

## 2019-01-16 PROCEDURE — 0502F SUBSEQUENT PRENATAL CARE: CPT | Mod: S$GLB,,, | Performed by: OBSTETRICS & GYNECOLOGY

## 2019-01-16 PROCEDURE — 85025 COMPLETE CBC W/AUTO DIFF WBC: CPT

## 2019-01-16 PROCEDURE — 90471 IMMUNIZATION ADMIN: CPT | Mod: S$GLB,,, | Performed by: OBSTETRICS & GYNECOLOGY

## 2019-01-16 PROCEDURE — 36415 COLL VENOUS BLD VENIPUNCTURE: CPT

## 2019-01-16 PROCEDURE — 90471 TDAP VACCINE GREATER THAN OR EQUAL TO 7YO IM: ICD-10-PCS | Mod: S$GLB,,, | Performed by: OBSTETRICS & GYNECOLOGY

## 2019-01-16 PROCEDURE — 82950 GLUCOSE TEST: CPT

## 2019-01-16 PROCEDURE — 99999 PR PBB SHADOW E&M-EST. PATIENT-LVL II: CPT | Mod: PBBFAC,,, | Performed by: OBSTETRICS & GYNECOLOGY

## 2019-01-16 NOTE — NURSING
Tdap given Right Deltoid. Pt tolerated well, no reaction noted. Instructed to wast 15 minutes to monitor for reaction. Pt voiced understanding.

## 2019-01-16 NOTE — PROGRESS NOTES
Patient with no complaints. Denies vaginal bleeding or contractions. Good FM. Tdap discussed and ordered today. Repeat GTT/CBC today. Discussed fetal kick count instructions with patient to monitor fetal movement. RTC in 2 weeks.    MFM follow up scheduled on 1/30. Patient reports seeing PT for hip pain/sciatic nerve pain.     Coffective counseling sheet Keep Baby Close discussed with mother. Reinforced rooming in practices, continued skin to skin, and quiet hours as requested by mother.  Encouraged mother to download Coffective mobile mychal if she has not already done so. Mother verbalizes understanding.

## 2019-01-30 ENCOUNTER — INITIAL CONSULT (OUTPATIENT)
Dept: MATERNAL FETAL MEDICINE | Facility: CLINIC | Age: 34
End: 2019-01-30
Attending: OBSTETRICS & GYNECOLOGY
Payer: COMMERCIAL

## 2019-01-30 ENCOUNTER — CLINICAL SUPPORT (OUTPATIENT)
Dept: PEDIATRIC CARDIOLOGY | Facility: CLINIC | Age: 34
End: 2019-01-30
Payer: COMMERCIAL

## 2019-01-30 ENCOUNTER — OFFICE VISIT (OUTPATIENT)
Dept: PEDIATRIC CARDIOLOGY | Facility: CLINIC | Age: 34
End: 2019-01-30
Attending: PEDIATRICS
Payer: COMMERCIAL

## 2019-01-30 ENCOUNTER — PROCEDURE VISIT (OUTPATIENT)
Dept: MATERNAL FETAL MEDICINE | Facility: CLINIC | Age: 34
End: 2019-01-30
Payer: COMMERCIAL

## 2019-01-30 VITALS
SYSTOLIC BLOOD PRESSURE: 125 MMHG | HEIGHT: 62 IN | BODY MASS INDEX: 53.92 KG/M2 | WEIGHT: 293 LBS | DIASTOLIC BLOOD PRESSURE: 70 MMHG | HEART RATE: 74 BPM

## 2019-01-30 VITALS — SYSTOLIC BLOOD PRESSURE: 110 MMHG | DIASTOLIC BLOOD PRESSURE: 80 MMHG | BODY MASS INDEX: 54.66 KG/M2 | WEIGHT: 293 LBS

## 2019-01-30 DIAGNOSIS — D25.9 UTERINE FIBROID IN PREGNANCY: ICD-10-CM

## 2019-01-30 DIAGNOSIS — E03.9 HYPOTHYROIDISM IN PREGNANCY, ANTEPARTUM: ICD-10-CM

## 2019-01-30 DIAGNOSIS — Z98.891 HISTORY OF CESAREAN DELIVERY: ICD-10-CM

## 2019-01-30 DIAGNOSIS — Z36.4 ANTENATAL SCREENING FOR FETAL GROWTH RETARDATION USING ULTRASONICS: ICD-10-CM

## 2019-01-30 DIAGNOSIS — D25.9 UTERINE FIBROID IN PREGNANCY: Primary | ICD-10-CM

## 2019-01-30 DIAGNOSIS — O99.891 CURRENT MATERNAL CONDITION AFFECTING PREGNANCY: ICD-10-CM

## 2019-01-30 DIAGNOSIS — O34.10 UTERINE FIBROID IN PREGNANCY: ICD-10-CM

## 2019-01-30 DIAGNOSIS — O34.10 UTERINE FIBROID IN PREGNANCY: Primary | ICD-10-CM

## 2019-01-30 DIAGNOSIS — Z36.3 ANTENATAL SCREENING FOR MALFORMATION USING ULTRASONICS: ICD-10-CM

## 2019-01-30 DIAGNOSIS — O09.522 AMA (ADVANCED MATERNAL AGE) MULTIGRAVIDA 35+, SECOND TRIMESTER: ICD-10-CM

## 2019-01-30 DIAGNOSIS — O09.523 ELDERLY MULTIGRAVIDA IN THIRD TRIMESTER: ICD-10-CM

## 2019-01-30 DIAGNOSIS — Z36.89 ENCOUNTER FOR ULTRASOUND TO CHECK FETAL GROWTH: ICD-10-CM

## 2019-01-30 DIAGNOSIS — Z36.89 ENCOUNTER FOR ULTRASOUND TO CHECK FETAL GROWTH: Primary | ICD-10-CM

## 2019-01-30 DIAGNOSIS — O99.280 HYPOTHYROIDISM IN PREGNANCY, ANTEPARTUM: ICD-10-CM

## 2019-01-30 PROCEDURE — 99999 PR PBB SHADOW E&M-EST. PATIENT-LVL II: CPT | Mod: PBBFAC,,, | Performed by: OBSTETRICS & GYNECOLOGY

## 2019-01-30 PROCEDURE — 99499 NO LOS: ICD-10-PCS | Mod: S$GLB,,, | Performed by: OBSTETRICS & GYNECOLOGY

## 2019-01-30 PROCEDURE — 76825 ECHO EXAM OF FETAL HEART: CPT | Mod: S$GLB,,, | Performed by: PEDIATRICS

## 2019-01-30 PROCEDURE — 76827 ECHO EXAM OF FETAL HEART: CPT | Mod: S$GLB,,, | Performed by: PEDIATRICS

## 2019-01-30 PROCEDURE — 99999 PR PBB SHADOW E&M-EST. PATIENT-LVL III: CPT | Mod: PBBFAC,,, | Performed by: PEDIATRICS

## 2019-01-30 PROCEDURE — 99499 UNLISTED E&M SERVICE: CPT | Mod: S$GLB,,, | Performed by: OBSTETRICS & GYNECOLOGY

## 2019-01-30 PROCEDURE — 99203 PR OFFICE/OUTPT VISIT, NEW, LEVL III, 30-44 MIN: ICD-10-PCS | Mod: 25,S$GLB,, | Performed by: PEDIATRICS

## 2019-01-30 PROCEDURE — 76816 PR  US,PREGNANT UTERUS,F/U,TRANSABD APP: ICD-10-PCS | Mod: S$GLB,,, | Performed by: OBSTETRICS & GYNECOLOGY

## 2019-01-30 PROCEDURE — 93325 DOPPLER ECHO COLOR FLOW MAPG: CPT | Mod: S$GLB,,, | Performed by: PEDIATRICS

## 2019-01-30 PROCEDURE — 99999 PR PBB SHADOW E&M-EST. PATIENT-LVL III: ICD-10-PCS | Mod: PBBFAC,,, | Performed by: PEDIATRICS

## 2019-01-30 PROCEDURE — 76816 OB US FOLLOW-UP PER FETUS: CPT | Mod: S$GLB,,, | Performed by: OBSTETRICS & GYNECOLOGY

## 2019-01-30 PROCEDURE — 93325 PR DOPPLER COLOR FLOW VELOCITY MAP: ICD-10-PCS | Mod: S$GLB,,, | Performed by: PEDIATRICS

## 2019-01-30 PROCEDURE — 99999 PR PBB SHADOW E&M-EST. PATIENT-LVL II: ICD-10-PCS | Mod: PBBFAC,,, | Performed by: OBSTETRICS & GYNECOLOGY

## 2019-01-30 PROCEDURE — 99203 OFFICE O/P NEW LOW 30 MIN: CPT | Mod: 25,S$GLB,, | Performed by: PEDIATRICS

## 2019-01-30 PROCEDURE — 76825 PR  SO2 FETAL HEART: ICD-10-PCS | Mod: S$GLB,,, | Performed by: PEDIATRICS

## 2019-01-30 PROCEDURE — 3008F BODY MASS INDEX DOCD: CPT | Mod: CPTII,S$GLB,, | Performed by: PEDIATRICS

## 2019-01-30 PROCEDURE — 3008F PR BODY MASS INDEX (BMI) DOCUMENTED: ICD-10-PCS | Mod: CPTII,S$GLB,, | Performed by: PEDIATRICS

## 2019-01-30 PROCEDURE — 76827 PR  SO2 FETAL HEART DOPPLER: ICD-10-PCS | Mod: S$GLB,,, | Performed by: PEDIATRICS

## 2019-01-30 NOTE — PROGRESS NOTES
I had the pleasure of evaluating Deneen Borrego who is now 33 y.o.  and carrying her 3rd pregnancy at 30 weeks gestation. She was referred for evaluation of the fetal heart due to increased risks for congenital heart disease associated with advanced maternal age and difficulty demonstrating fetal cardiac anatomy in face of BMI = 54.66.  Of note, maternal cell free DNA testing returned unremarkable for trisomy 13, 18 or 21.      Current Outpatient Medications:     levothyroxine (SYNTHROID) 50 MCG tablet, Take 50 mcg by mouth once daily., Disp: , Rfl:     nystatin (MYCOSTATIN) cream, Apply topically 2 (two) times daily., Disp: 30 g, Rfl: 0    prenatal 25/iron fum/folic/dha (PRENATAL-1 ORAL), Take by mouth., Disp: , Rfl:   Review of patient's allergies indicates:  No Known Allergies    Maternal factors increasing risk for congenital heart disease:  Unremarkable  Paternal factors increasing risk for congenital heart disease:  Unremarkable    FETAL ECHOCARDIOGRAM (preliminary):  Technically limited by available acoustic windows:  Normal fetal cardiac connections and function for estimated gestational age in available images.  (Full report in electronic medical record)    I reviewed the strengths and weaknesses of fetal echocardiograph including the insufficient sensitivity to rule out many septal defects, anomalies of pulmonary and systemic veins, arch anomalies, and some valvar abnormalities. I also discussed that  resolution of the ductus arteriosus and foramen ovale (normal fetal structures) cannot be assured by fetal evaluation. Finally, I reviewed today's echocardiogram that was compromised by available acoustic windows but overall demonstrates normal anatomical connections and function for the estimated gestational age. Within the limitations imposed by fetal physiology, I would expect a reasonable probability that this infant will be born with a normal heart.  With the images available today, I am  not particularly concerned about hemodynamically significant congenital heart disease that might present at delivery.  I know that the mother would prefer to deliver near home at the [facility] and I do not think I have any reason to recommend alteration from that plan for delivery unless new concerns arise in the future.    I reviewed the concerns related to advanced maternal age and in particular the risk for Down syndrome which is frequently associated with congenital heart disease. Ms. Borrego elected to perform maternal cell free DNA testing which demonstrated no evidence for trisomy 21,18 or 13. This test has a high predictive value and is very reassuring. In addition, the fetal cardiac evaluation also does not demonstrate any evidence for congenital heart disease which is highly associated with each of these genetic abnormalities in some form.    I answered all the mother's questions. I also provided an information sheet reviewing the fetal physiology and compares this with normal  physiology.  This sheet also includes a reiteration of the limitations of fetal echocardiography that I have discussed with her today. I have not scheduled another evaluation; however, if questions arise later during gestation or after delivery, I would be happy to assist in any way. Ms. Borrego is comfortable with the plan.    RECOMMENDATIONS:  Evaluation of the infant after delivery if the clinical exam is abnormal        Note:  Over 50% of visit in consultation with the family > 30 minutes

## 2019-02-04 ENCOUNTER — ROUTINE PRENATAL (OUTPATIENT)
Dept: OBSTETRICS AND GYNECOLOGY | Facility: CLINIC | Age: 34
End: 2019-02-04
Payer: COMMERCIAL

## 2019-02-04 ENCOUNTER — TELEPHONE (OUTPATIENT)
Dept: OBSTETRICS AND GYNECOLOGY | Facility: CLINIC | Age: 34
End: 2019-02-04

## 2019-02-04 VITALS
DIASTOLIC BLOOD PRESSURE: 85 MMHG | WEIGHT: 293 LBS | SYSTOLIC BLOOD PRESSURE: 122 MMHG | HEART RATE: 88 BPM | BODY MASS INDEX: 54.86 KG/M2

## 2019-02-04 DIAGNOSIS — O09.522 AMA (ADVANCED MATERNAL AGE) MULTIGRAVIDA 35+, SECOND TRIMESTER: Primary | ICD-10-CM

## 2019-02-04 DIAGNOSIS — Z34.83 ENCOUNTER FOR SUPERVISION OF OTHER NORMAL PREGNANCY IN THIRD TRIMESTER: ICD-10-CM

## 2019-02-04 DIAGNOSIS — Z98.891 HISTORY OF CESAREAN DELIVERY: ICD-10-CM

## 2019-02-04 PROCEDURE — 0502F SUBSEQUENT PRENATAL CARE: CPT | Mod: S$GLB,,, | Performed by: OBSTETRICS & GYNECOLOGY

## 2019-02-04 PROCEDURE — 99999 PR PBB SHADOW E&M-EST. PATIENT-LVL II: CPT | Mod: PBBFAC,,, | Performed by: OBSTETRICS & GYNECOLOGY

## 2019-02-04 PROCEDURE — 99999 PR PBB SHADOW E&M-EST. PATIENT-LVL II: ICD-10-PCS | Mod: PBBFAC,,, | Performed by: OBSTETRICS & GYNECOLOGY

## 2019-02-04 PROCEDURE — 0502F PR SUBSEQUENT PRENATAL CARE: ICD-10-PCS | Mod: S$GLB,,, | Performed by: OBSTETRICS & GYNECOLOGY

## 2019-02-04 RX ORDER — NYSTATIN 100000 U/G
CREAM TOPICAL 2 TIMES DAILY
Qty: 30 G | Refills: 0 | Status: ON HOLD | OUTPATIENT
Start: 2019-02-04 | End: 2019-03-29 | Stop reason: HOSPADM

## 2019-02-04 NOTE — PROGRESS NOTES
Patient with  Complaint of yeast rash under breast.  Requesting refill on nystatin.. Denies vaginal bleeding or contractions. Good FM. Growth scan   Set up with MFM in 2 weeks.     Coffective Motivation Document discussed with mother. Reinforced benefits of breastfeeding, risk of formula, and cue based feedings. Encouraged mother to download Coffective mobile mychal if she has not already done so. Mother verbalizes understanding.

## 2019-02-04 NOTE — PROGRESS NOTES
See ultrasound report for details of ultrasound evaluation, consultation,  and recommendations.

## 2019-02-21 ENCOUNTER — ROUTINE PRENATAL (OUTPATIENT)
Dept: OBSTETRICS AND GYNECOLOGY | Facility: CLINIC | Age: 34
End: 2019-02-21
Payer: COMMERCIAL

## 2019-02-21 VITALS
WEIGHT: 293 LBS | SYSTOLIC BLOOD PRESSURE: 124 MMHG | BODY MASS INDEX: 55.95 KG/M2 | DIASTOLIC BLOOD PRESSURE: 72 MMHG | HEART RATE: 87 BPM

## 2019-02-21 DIAGNOSIS — Z34.83 ENCOUNTER FOR SUPERVISION OF OTHER NORMAL PREGNANCY IN THIRD TRIMESTER: Primary | ICD-10-CM

## 2019-02-21 DIAGNOSIS — O09.522 AMA (ADVANCED MATERNAL AGE) MULTIGRAVIDA 35+, SECOND TRIMESTER: ICD-10-CM

## 2019-02-21 DIAGNOSIS — Z98.891 HISTORY OF CESAREAN DELIVERY: ICD-10-CM

## 2019-02-21 PROCEDURE — 0502F SUBSEQUENT PRENATAL CARE: CPT | Mod: CPTII,S$GLB,, | Performed by: OBSTETRICS & GYNECOLOGY

## 2019-02-21 PROCEDURE — 0502F PR SUBSEQUENT PRENATAL CARE: ICD-10-PCS | Mod: CPTII,S$GLB,, | Performed by: OBSTETRICS & GYNECOLOGY

## 2019-02-21 PROCEDURE — 99999 PR PBB SHADOW E&M-EST. PATIENT-LVL II: ICD-10-PCS | Mod: PBBFAC,,, | Performed by: OBSTETRICS & GYNECOLOGY

## 2019-02-21 PROCEDURE — 99999 PR PBB SHADOW E&M-EST. PATIENT-LVL II: CPT | Mod: PBBFAC,,, | Performed by: OBSTETRICS & GYNECOLOGY

## 2019-02-21 NOTE — PROGRESS NOTES
Begin weekly BPP is a as requested by MFM.  Patient has appointment next week with MFM and then the following week with us    Patient with no complaints. Denies vaginal bleeding or contractions. Good FM. RTC in 2 weeks.

## 2019-02-22 ENCOUNTER — PROCEDURE VISIT (OUTPATIENT)
Dept: OBSTETRICS AND GYNECOLOGY | Facility: CLINIC | Age: 34
End: 2019-02-22
Payer: COMMERCIAL

## 2019-02-22 ENCOUNTER — CLINICAL SUPPORT (OUTPATIENT)
Dept: OBSTETRICS AND GYNECOLOGY | Facility: CLINIC | Age: 34
End: 2019-02-22
Payer: COMMERCIAL

## 2019-02-22 DIAGNOSIS — O09.523 AMA (ADVANCED MATERNAL AGE) MULTIGRAVIDA 35+, THIRD TRIMESTER: ICD-10-CM

## 2019-02-22 DIAGNOSIS — O09.522 ADVANCED MATERNAL AGE IN MULTIGRAVIDA, SECOND TRIMESTER: Primary | ICD-10-CM

## 2019-02-22 DIAGNOSIS — O09.522 AMA (ADVANCED MATERNAL AGE) MULTIGRAVIDA 35+, SECOND TRIMESTER: ICD-10-CM

## 2019-02-22 PROCEDURE — 59025 FETAL NON-STRESS TEST: CPT | Mod: S$GLB,,, | Performed by: OBSTETRICS & GYNECOLOGY

## 2019-02-22 PROCEDURE — 76819 FETAL BIOPHYS PROFIL W/O NST: CPT | Mod: S$GLB,,, | Performed by: OBSTETRICS & GYNECOLOGY

## 2019-02-22 PROCEDURE — 76819 PR US, OB, FETAL BIOPHYSICAL, W/O NST: ICD-10-PCS | Mod: S$GLB,,, | Performed by: OBSTETRICS & GYNECOLOGY

## 2019-02-22 PROCEDURE — 59025 FETAL NON-STRESS TEST- TODAY: ICD-10-PCS | Mod: S$GLB,,, | Performed by: OBSTETRICS & GYNECOLOGY

## 2019-02-22 NOTE — PROCEDURES
Fetal non-stress test- Today  Date/Time: 2019 4:48 PM  Performed by: Patrick Garrido MD  Authorized by: Patrick Garrido MD     Nonstress Test:     Variability:  6-25 BPM    Decelerations:  None    Accelerations:  15 bpm  Biophysical Profile:     Nonstress Test Interpretation: reactive      Overall Impression:  Reassuring  Post-procedure:     Patient tolerance:  Patient tolerated the procedure well with no immediate complications      FETAL ASSESSMENT REPORT    RE: Deneen Borrego  MRN:  [id number]  :  1985  AGE:  33 y.o.    Date:  2019    REFERRAL PHYSICIAN: Dr. Patrick Garrido    Allergies: Patient has no known allergies.    Deneen is a 33 y.o.  at 33w2d gestational age here today for a NST.    4/10/2019, by Ultrasound    MEDICATIONS AT TIME OF TEST:  Current Outpatient Medications:  levothyroxine (SYNTHROID) 50 MCG tablet, Take 50 mcg by mouth once daily., Disp: , Rfl:   nystatin (MYCOSTATIN) cream, Apply topically 2 (two) times daily., Disp: 30 g, Rfl: 0  prenatal 25/iron fum/folic/dha (PRENATAL-1 ORAL), Take by mouth., Disp: , Rfl:   pseudoephedrine HCl (SUDAFED ORAL), Take by mouth., Disp: , Rfl:     No current facility-administered medications for this visit.       Indication:     Interpretation:  140 BPM baseline    Variability:  Good {> 6 bpm)    Accelerations:  Reactive    Decelerations:  none    Assessment: reactive    Plan:  discussed      Patrick Garrido MD  2019; 4:49 PM

## 2019-02-27 ENCOUNTER — PROCEDURE VISIT (OUTPATIENT)
Dept: MATERNAL FETAL MEDICINE | Facility: CLINIC | Age: 34
End: 2019-02-27
Payer: COMMERCIAL

## 2019-02-27 ENCOUNTER — INITIAL CONSULT (OUTPATIENT)
Dept: MATERNAL FETAL MEDICINE | Facility: CLINIC | Age: 34
End: 2019-02-27
Attending: OBSTETRICS & GYNECOLOGY
Payer: COMMERCIAL

## 2019-02-27 VITALS — BODY MASS INDEX: 56.2 KG/M2 | DIASTOLIC BLOOD PRESSURE: 78 MMHG | SYSTOLIC BLOOD PRESSURE: 128 MMHG | WEIGHT: 293 LBS

## 2019-02-27 DIAGNOSIS — O99.891 CURRENT MATERNAL CONDITION AFFECTING PREGNANCY: ICD-10-CM

## 2019-02-27 DIAGNOSIS — O99.280 HYPOTHYROIDISM IN PREGNANCY, ANTEPARTUM: ICD-10-CM

## 2019-02-27 DIAGNOSIS — E03.9 HYPOTHYROIDISM IN PREGNANCY, ANTEPARTUM: ICD-10-CM

## 2019-02-27 DIAGNOSIS — Z36.4 ANTENATAL SCREENING FOR FETAL GROWTH RETARDATION USING ULTRASONICS: ICD-10-CM

## 2019-02-27 DIAGNOSIS — O09.523 ELDERLY MULTIGRAVIDA IN THIRD TRIMESTER: ICD-10-CM

## 2019-02-27 DIAGNOSIS — Z36.89 ENCOUNTER FOR ULTRASOUND TO CHECK FETAL GROWTH: ICD-10-CM

## 2019-02-27 PROCEDURE — 99499 NO LOS: ICD-10-PCS | Mod: S$GLB,,, | Performed by: OBSTETRICS & GYNECOLOGY

## 2019-02-27 PROCEDURE — 76819 FETAL BIOPHYS PROFIL W/O NST: CPT | Mod: S$GLB,,, | Performed by: OBSTETRICS & GYNECOLOGY

## 2019-02-27 PROCEDURE — 99499 UNLISTED E&M SERVICE: CPT | Mod: S$GLB,,, | Performed by: OBSTETRICS & GYNECOLOGY

## 2019-02-27 PROCEDURE — 76816 OB US FOLLOW-UP PER FETUS: CPT | Mod: S$GLB,,, | Performed by: OBSTETRICS & GYNECOLOGY

## 2019-02-27 PROCEDURE — 76816 PR  US,PREGNANT UTERUS,F/U,TRANSABD APP: ICD-10-PCS | Mod: S$GLB,,, | Performed by: OBSTETRICS & GYNECOLOGY

## 2019-02-27 PROCEDURE — 99999 PR PBB SHADOW E&M-EST. PATIENT-LVL III: CPT | Mod: PBBFAC,,, | Performed by: OBSTETRICS & GYNECOLOGY

## 2019-02-27 PROCEDURE — 99999 PR PBB SHADOW E&M-EST. PATIENT-LVL III: ICD-10-PCS | Mod: PBBFAC,,, | Performed by: OBSTETRICS & GYNECOLOGY

## 2019-02-27 PROCEDURE — 76819 PR US, OB, FETAL BIOPHYSICAL, W/O NST: ICD-10-PCS | Mod: S$GLB,,, | Performed by: OBSTETRICS & GYNECOLOGY

## 2019-03-07 ENCOUNTER — ROUTINE PRENATAL (OUTPATIENT)
Dept: OBSTETRICS AND GYNECOLOGY | Facility: CLINIC | Age: 34
End: 2019-03-07
Payer: COMMERCIAL

## 2019-03-07 ENCOUNTER — PROCEDURE VISIT (OUTPATIENT)
Dept: OBSTETRICS AND GYNECOLOGY | Facility: CLINIC | Age: 34
End: 2019-03-07
Payer: COMMERCIAL

## 2019-03-07 ENCOUNTER — TELEPHONE (OUTPATIENT)
Dept: OBSTETRICS AND GYNECOLOGY | Facility: CLINIC | Age: 34
End: 2019-03-07

## 2019-03-07 VITALS
WEIGHT: 293 LBS | DIASTOLIC BLOOD PRESSURE: 89 MMHG | SYSTOLIC BLOOD PRESSURE: 129 MMHG | HEART RATE: 79 BPM | BODY MASS INDEX: 56.87 KG/M2

## 2019-03-07 DIAGNOSIS — Z34.83 ENCOUNTER FOR SUPERVISION OF OTHER NORMAL PREGNANCY IN THIRD TRIMESTER: ICD-10-CM

## 2019-03-07 DIAGNOSIS — O09.522 MATERNAL AGE 35+, MULTIGRAVIDA, SECOND TRIMESTER: Primary | ICD-10-CM

## 2019-03-07 DIAGNOSIS — O34.10 UTERINE FIBROID IN PREGNANCY: ICD-10-CM

## 2019-03-07 DIAGNOSIS — Z98.891 HISTORY OF CESAREAN DELIVERY: ICD-10-CM

## 2019-03-07 DIAGNOSIS — O09.522 MATERNAL AGE 35+, MULTIGRAVIDA, SECOND TRIMESTER: ICD-10-CM

## 2019-03-07 DIAGNOSIS — O09.523 ELDERLY MULTIGRAVIDA IN THIRD TRIMESTER: ICD-10-CM

## 2019-03-07 DIAGNOSIS — O99.213 MATERNAL MORBID OBESITY IN THIRD TRIMESTER, ANTEPARTUM: ICD-10-CM

## 2019-03-07 DIAGNOSIS — E66.01 MATERNAL MORBID OBESITY IN THIRD TRIMESTER, ANTEPARTUM: ICD-10-CM

## 2019-03-07 DIAGNOSIS — O09.522 AMA (ADVANCED MATERNAL AGE) MULTIGRAVIDA 35+, SECOND TRIMESTER: Primary | ICD-10-CM

## 2019-03-07 DIAGNOSIS — D25.9 UTERINE FIBROID IN PREGNANCY: ICD-10-CM

## 2019-03-07 PROCEDURE — 76819 FETAL BIOPHYS PROFIL W/O NST: CPT | Mod: S$GLB,,, | Performed by: OBSTETRICS & GYNECOLOGY

## 2019-03-07 PROCEDURE — 0502F SUBSEQUENT PRENATAL CARE: CPT | Mod: CPTII,S$GLB,, | Performed by: OBSTETRICS & GYNECOLOGY

## 2019-03-07 PROCEDURE — 76819 PR US, OB, FETAL BIOPHYSICAL, W/O NST: ICD-10-PCS | Mod: S$GLB,,, | Performed by: OBSTETRICS & GYNECOLOGY

## 2019-03-07 PROCEDURE — 99999 PR PBB SHADOW E&M-EST. PATIENT-LVL II: ICD-10-PCS | Mod: PBBFAC,,, | Performed by: OBSTETRICS & GYNECOLOGY

## 2019-03-07 PROCEDURE — 0502F PR SUBSEQUENT PRENATAL CARE: ICD-10-PCS | Mod: CPTII,S$GLB,, | Performed by: OBSTETRICS & GYNECOLOGY

## 2019-03-07 PROCEDURE — 99999 PR PBB SHADOW E&M-EST. PATIENT-LVL II: CPT | Mod: PBBFAC,,, | Performed by: OBSTETRICS & GYNECOLOGY

## 2019-03-07 NOTE — PROCEDURES
Obstetrical ultrasound completed today.  See report in imaging section of [facility].

## 2019-03-07 NOTE — PROGRESS NOTES
Patient with no complaints. Denies vaginal bleeding or contractions. Good FM. GBS collected today. Labor precautions discused with patient. RTC in 1 week.   Biophysical profile 8/8

## 2019-03-08 ENCOUNTER — TELEPHONE (OUTPATIENT)
Dept: OBSTETRICS AND GYNECOLOGY | Facility: CLINIC | Age: 34
End: 2019-03-08

## 2019-03-08 NOTE — TELEPHONE ENCOUNTER
Progress notes from [facility] received. Placed in Dr Albert Folder for review. Dr Jarvis Pt.

## 2019-03-13 ENCOUNTER — PROCEDURE VISIT (OUTPATIENT)
Dept: OBSTETRICS AND GYNECOLOGY | Facility: CLINIC | Age: 34
End: 2019-03-13
Payer: COMMERCIAL

## 2019-03-13 ENCOUNTER — ROUTINE PRENATAL (OUTPATIENT)
Dept: OBSTETRICS AND GYNECOLOGY | Facility: CLINIC | Age: 34
End: 2019-03-13
Payer: COMMERCIAL

## 2019-03-13 VITALS
DIASTOLIC BLOOD PRESSURE: 86 MMHG | SYSTOLIC BLOOD PRESSURE: 120 MMHG | WEIGHT: 293 LBS | HEART RATE: 82 BPM | BODY MASS INDEX: 58.33 KG/M2

## 2019-03-13 DIAGNOSIS — Z98.891 HISTORY OF CESAREAN DELIVERY: ICD-10-CM

## 2019-03-13 DIAGNOSIS — O34.10 UTERINE FIBROID IN PREGNANCY: ICD-10-CM

## 2019-03-13 DIAGNOSIS — O09.522 MATERNAL AGE 35+, MULTIGRAVIDA, SECOND TRIMESTER: ICD-10-CM

## 2019-03-13 DIAGNOSIS — O09.523 AMA (ADVANCED MATERNAL AGE) MULTIGRAVIDA 35+, THIRD TRIMESTER: ICD-10-CM

## 2019-03-13 DIAGNOSIS — Z3A.36 36 WEEKS GESTATION OF PREGNANCY: ICD-10-CM

## 2019-03-13 DIAGNOSIS — D25.9 UTERINE FIBROID IN PREGNANCY: ICD-10-CM

## 2019-03-13 DIAGNOSIS — O09.522 AMA (ADVANCED MATERNAL AGE) MULTIGRAVIDA 35+, SECOND TRIMESTER: Primary | ICD-10-CM

## 2019-03-13 DIAGNOSIS — Z34.83 ENCOUNTER FOR SUPERVISION OF OTHER NORMAL PREGNANCY IN THIRD TRIMESTER: ICD-10-CM

## 2019-03-13 PROCEDURE — 87184 SC STD DISK METHOD PER PLATE: CPT

## 2019-03-13 PROCEDURE — 0502F SUBSEQUENT PRENATAL CARE: CPT | Mod: CPTII,S$GLB,, | Performed by: OBSTETRICS & GYNECOLOGY

## 2019-03-13 PROCEDURE — 0502F PR SUBSEQUENT PRENATAL CARE: ICD-10-PCS | Mod: CPTII,S$GLB,, | Performed by: OBSTETRICS & GYNECOLOGY

## 2019-03-13 PROCEDURE — 76819 FETAL BIOPHYS PROFIL W/O NST: CPT | Mod: S$GLB,,, | Performed by: OBSTETRICS & GYNECOLOGY

## 2019-03-13 PROCEDURE — 99999 PR PBB SHADOW E&M-EST. PATIENT-LVL III: ICD-10-PCS | Mod: PBBFAC,,, | Performed by: OBSTETRICS & GYNECOLOGY

## 2019-03-13 PROCEDURE — 76819 PR US, OB, FETAL BIOPHYSICAL, W/O NST: ICD-10-PCS | Mod: S$GLB,,, | Performed by: OBSTETRICS & GYNECOLOGY

## 2019-03-13 PROCEDURE — 87147 CULTURE TYPE IMMUNOLOGIC: CPT

## 2019-03-13 PROCEDURE — 87081 CULTURE SCREEN ONLY: CPT

## 2019-03-13 PROCEDURE — 99999 PR PBB SHADOW E&M-EST. PATIENT-LVL III: CPT | Mod: PBBFAC,,, | Performed by: OBSTETRICS & GYNECOLOGY

## 2019-03-13 RX ORDER — OMEPRAZOLE 20 MG/1
20 CAPSULE, DELAYED RELEASE ORAL DAILY
Status: ON HOLD | COMMUNITY
End: 2019-03-29 | Stop reason: HOSPADM

## 2019-03-16 LAB — BACTERIA SPEC AEROBE CULT: NORMAL

## 2019-03-17 ENCOUNTER — HOSPITAL ENCOUNTER (OUTPATIENT)
Facility: HOSPITAL | Age: 34
Discharge: HOME | End: 2019-03-17
Attending: OBSTETRICS & GYNECOLOGY | Admitting: OBSTETRICS & GYNECOLOGY
Payer: COMMERCIAL

## 2019-03-17 ENCOUNTER — NURSE TRIAGE (OUTPATIENT)
Dept: ADMINISTRATIVE | Facility: CLINIC | Age: 34
End: 2019-03-17

## 2019-03-17 VITALS
BODY MASS INDEX: 53.92 KG/M2 | OXYGEN SATURATION: 99 % | HEART RATE: 57 BPM | RESPIRATION RATE: 18 BRPM | DIASTOLIC BLOOD PRESSURE: 66 MMHG | SYSTOLIC BLOOD PRESSURE: 140 MMHG | HEIGHT: 62 IN | TEMPERATURE: 98 F | WEIGHT: 293 LBS

## 2019-03-17 DIAGNOSIS — O16.3 ELEVATED BLOOD PRESSURE AFFECTING PREGNANCY IN THIRD TRIMESTER, ANTEPARTUM: ICD-10-CM

## 2019-03-17 LAB
CREAT UR-MCNC: 229.3 MG/DL
PROT UR-MCNC: 28 MG/DL
PROT/CREAT UR: 0.12 MG/G{CREAT}

## 2019-03-17 PROCEDURE — G0378 HOSPITAL OBSERVATION PER HR: HCPCS

## 2019-03-17 PROCEDURE — 59025 FETAL NON-STRESS TEST: CPT

## 2019-03-17 PROCEDURE — 84156 ASSAY OF PROTEIN URINE: CPT

## 2019-03-17 PROCEDURE — 99211 OFF/OP EST MAY X REQ PHY/QHP: CPT | Mod: 25

## 2019-03-17 PROCEDURE — 99220 PR INITIAL OBSERVATION CARE,LEVL III: CPT | Mod: ,,, | Performed by: OBSTETRICS & GYNECOLOGY

## 2019-03-17 PROCEDURE — 99220 PR INITIAL OBSERVATION CARE,LEVL III: ICD-10-PCS | Mod: ,,, | Performed by: OBSTETRICS & GYNECOLOGY

## 2019-03-17 RX ORDER — ONDANSETRON 4 MG/1
8 TABLET, ORALLY DISINTEGRATING ORAL EVERY 8 HOURS PRN
Status: DISCONTINUED | OUTPATIENT
Start: 2019-03-17 | End: 2019-03-17 | Stop reason: HOSPADM

## 2019-03-17 RX ORDER — ACETAMINOPHEN 500 MG
500 TABLET ORAL EVERY 6 HOURS PRN
Status: DISCONTINUED | OUTPATIENT
Start: 2019-03-17 | End: 2019-03-17 | Stop reason: HOSPADM

## 2019-03-17 NOTE — TELEPHONE ENCOUNTER
Reason for Disposition   SEVERE leg swelling (e.g., swelling extends above knee, entire leg is swollen)    Protocols used: PREGNANCY - LEG SWELLING AND EDEMA-A-AH    Pt calling regarding severe leg swelling, high BP reading of 186/92, and H/A.  Care advice given.

## 2019-03-17 NOTE — NURSING
Pt given oral and written instructions. VS stable. Pt ambulated out of unit with s/o and family at side.

## 2019-03-17 NOTE — SUBJECTIVE & OBJECTIVE
Obstetric HPI:  Patient reports None contractions, active fetal movement, No vaginal bleeding , No loss of fluid     This pregnancy has been complicated by previous  sections and elevated blood pressure readings.  Advanced maternal age    Obstetric History       T2      L2     SAB0   TAB0   Ectopic0   Multiple0   Live Births2       # Outcome Date GA Lbr Tru/2nd Weight Sex Delivery Anes PTL Lv   3 Current            2 Term 08 38w0d  4.309 kg (9 lb 8 oz) F CS-LTranv  N JASIEL      Name: Ryley   1 Term 00 41w0d  4.394 kg (9 lb 11 oz) F Vag-Spont  N JASIEL      Name: Silvia        Past Medical History:   Diagnosis Date    Advanced maternal age in multigravida, second trimester      Past Surgical History:   Procedure Laterality Date     SECTION      x1    CHOLECYSTECTOMY      THYROIDECTOMY, PARTIAL         PTA Medications   Medication Sig    levothyroxine (SYNTHROID) 50 MCG tablet Take 50 mcg by mouth once daily.    omeprazole (PRILOSEC) 20 MG capsule Take 20 mg by mouth once daily.    prenatal 25/iron fum/folic/dha (PRENATAL-1 ORAL) Take by mouth.    nystatin (MYCOSTATIN) cream Apply topically 2 (two) times daily.    pseudoephedrine HCl (SUDAFED ORAL) Take by mouth.       Review of patient's allergies indicates:  No Known Allergies     Family History     Problem Relation (Age of Onset)    Arrhythmia Maternal Grandfather        Tobacco Use    Smoking status: Never Smoker    Smokeless tobacco: Never Used   Substance and Sexual Activity    Alcohol use: No     Frequency: Never    Drug use: No    Sexual activity: Yes     Partners: Male     Birth control/protection: None     Comment:      Review of Systems   All other systems reviewed and are negative.     Objective:     Vital Signs (Most Recent):  Temp: 98.1 °F (36.7 °C) (19 1406)  Pulse: (!) 57 (19 1549)  Resp: 18 (19 1406)  BP: (!) 140/66(Simultaneous filing. User may not have seen previous data.)  (03/17/19 1545)  SpO2: 99 % (03/17/19 1549) Vital Signs (24h Range):  Temp:  [98.1 °F (36.7 °C)] 98.1 °F (36.7 °C)  Pulse:  [57-62] 57  Resp:  [18] 18  SpO2:  [97 %-100 %] 99 %  BP: (140-166)/(66-92) 140/66     Weight: (!) 144.7 kg (319 lb)  Body mass index is 58.35 kg/m².    FHT: Cat 1 (reassuring)  TOCO:  Q 0 minutes    Physical Exam:   Constitutional: She appears well-developed and well-nourished.       Cardiovascular: Normal rate, regular rhythm and normal heart sounds.     Pulmonary/Chest: Effort normal and breath sounds normal.        Abdominal: Soft. Bowel sounds are normal.             Musculoskeletal: She exhibits edema.               Significant Labs:  Lab Results   Component Value Date    GROUPTRH O POS 08/16/2018    HEPBSAG Negative 08/16/2018    STREPBCULT  03/13/2019     STREPTOCOCCUS AGALACTIAE (GROUP B)  Beta-hemolytic streptococci are routinely susceptible to   penicillins,cephalosporins and carbapenems.         I have personallly reviewed all pertinent lab results from the last 24 hours.

## 2019-03-17 NOTE — HOSPITAL COURSE
Patient monitored on Labor and delivery with no significant contractions and a reactive NST.  Blood pressures have been in a more normal range with pressures in the 140s over 80s.  P/C ratio was 0.12.    Counseling was done and patient will be placed off of work and on bed rest.  She will continue to monitor her blood pressures and have follow up on Wednesday as scheduled.  Patient will bring her blood pressure cuff for calibration on Wednesday.

## 2019-03-17 NOTE — LETTER
March 17, 2019         [address] [facility] [address]  Phone: [phone]  Fax: [phone]       Patient: Deneen Borrego   YOB: 1985  Date of Visit: 03/17/2019    To Whom It May Concern:    Ebonie Borrego  was at [facility] on 03/17/2019. She may return to work/school on 3/21/19 with no restrictions. If you have any questions or concerns, or if I can be of further assistance, please do not hesitate to contact me.    Sincerely,    Heather Mast RN

## 2019-03-17 NOTE — DISCHARGE INSTRUCTIONS
Return to the labor unit or call ob nurse at hospital if:    1.  Any questions whether the water bag broke or is leaking (sudden gush or suspected leak).   2.  If 35 wks or greater, no need to call about passing the mucous plug if no other signs of labor.  3.  You may have spotting for a day or two from the vaginal exam  4.  Please report heavier vaginal bleeding (requires you to put a pad on or blood is running down your leg)  5. Report if you are having more than 6 contractions in an hour and less than 37 weeks, but at 37-38 wks. and beyond you can time your contractions and notify doctor if they are longer, stronger and closer together  6.  You should call if you are experiencing sharp abdominal pains or severe cramping.    7.  If you are > or = 28 wks, Do fetal kick counts 2 times a day  &  report decreased fetal movement:  You should feel 10 movements in 2 hours or less.  Notify MD or OB nurse as soon as possible if you are not getting the required movements or if  it is taking you longer and longer to get the 10 movements and DO NOT WAIT UNTIL TOMORROW EVEN IF YOU HAVE AN APPT.  8.  Drink plenty of water and empty your bladder often, avoid caffeine & carbonated beverages as much as possible & avoid smoking  9.  Keep your appt. as scheduled    Office phone # [phone]  If after office hours, on the weekend, or unable to reach the nurse at the office, call the Hospital phone # [phone] & ask to speak to the OB nurse

## 2019-03-17 NOTE — DISCHARGE SUMMARY
[facility]  Obstetrics  Discharge Summary      Patient Name: Deneen Borrego  MRN: [id number]  Admission Date: 3/17/2019  Hospital Length of Stay: 0 days  Discharge Date and Time:  2019 4:02 PM  Attending Physician: Patrick Garrido MD   Discharging Provider: Patrick Garrido MD  Primary Care Provider: Go Corrigan MD    HPI: Patient presents complaining of elevated blood pressure reading at home with her automated machine.  Patient does admitted has not been calibrated.  Patient states that she has had swelling of her lower extremities however it has been better for the last couple of days.  She has also had a headache in the past which has also been better for the last few days.  She admits to good fetal movement she denies any contractions or any vaginal bleeding.    * No surgery found *     Hospital Course:   Patient monitored on Labor and delivery with no significant contractions and a reactive NST.  Blood pressures have been in a more normal range with pressures in the 140s over 80s.  P/C ratio was 0.12.    Counseling was done and patient will be placed off of work and on bed rest.  She will continue to monitor her blood pressures and have follow up on Wednesday as scheduled.  Patient will bring her blood pressure cuff for calibration on Wednesday.        Final Active Diagnoses:    Diagnosis Date Noted POA    PRINCIPAL PROBLEM:  Elevated blood pressure affecting pregnancy in third trimester, antepartum [O16.3] 2019 Yes    History of  delivery [Z98.891] 2018 Not Applicable    AMA (advanced maternal age) multigravida 35+, second trimester [O09.522] 2018 Yes      Problems Resolved During this Admission:                Immunizations     None          This patient has no babies on file.  Pending Diagnostic Studies:     None          Discharged Condition: good    Disposition: Home or Self Care    Follow Up:  3 days doctor Matheus    Follow-up Information      Josiane Mills MD In 3 days.    Specialty:  Obstetrics and Gynecology  Contact information:  [address] [place] [id number] [phone]                 Patient Instructions:      No dressing needed     Medications:  Current Discharge Medication List      CONTINUE these medications which have NOT CHANGED    Details   levothyroxine (SYNTHROID) 50 MCG tablet Take 50 mcg by mouth once daily.      omeprazole (PRILOSEC) 20 MG capsule Take 20 mg by mouth once daily.      prenatal 25/iron fum/folic/dha (PRENATAL-1 ORAL) Take by mouth.      nystatin (MYCOSTATIN) cream Apply topically 2 (two) times daily.  Qty: 30 g, Refills: 0      pseudoephedrine HCl (SUDAFED ORAL) Take by mouth.             Patrick Garrido MD  Obstetrics  [facility]

## 2019-03-17 NOTE — HPI
Patient presents complaining of elevated blood pressure reading at home with her automated machine.  Patient does admitted has not been calibrated.  Patient states that she has had swelling of her lower extremities however it has been better for the last couple of days.  She has also had a headache in the past which has also been better for the last few days.  She admits to good fetal movement she denies any contractions or any vaginal bleeding.

## 2019-03-19 NOTE — H&P
[facility]  Obstetrics  History & Physical    Patient Name: Deneen Borrego  MRN: [id number]  Admission Date: 3/17/2019  Primary Care Provider: Go Corrigan MD    Subjective:     Principal Problem:Elevated blood pressure affecting pregnancy in third trimester, antepartum    History of Present Illness:  Patient presents complaining of elevated blood pressure reading at home with her automated machine.  Patient does admitted has not been calibrated.  Patient states that she has had swelling of her lower extremities however it has been better for the last couple of days.  She has also had a headache in the past which has also been better for the last few days.  She admits to good fetal movement she denies any contractions or any vaginal bleeding.    Obstetric HPI:  Patient reports  contractions, active fetal movement, No vaginal bleeding , No loss of fluid     This pregnancy has been complicated by     Obstetric History       T2      L2     SAB0   TAB0   Ectopic0   Multiple0   Live Births2       # Outcome Date GA Lbr Tru/2nd Weight Sex Delivery Anes PTL Lv   3 Current            2 Term 08 38w0d  4.309 kg (9 lb 8 oz) F CS-LTranv  N JASIEL      Name: Ryley   1 Term 00 41w0d  4.394 kg (9 lb 11 oz) F Vag-Spont  N JASIEL      Name: Silvia        Past Medical History:   Diagnosis Date    Advanced maternal age in multigravida, second trimester      Past Surgical History:   Procedure Laterality Date     SECTION      x1    CHOLECYSTECTOMY      THYROIDECTOMY, PARTIAL         No medications prior to admission.       Review of patient's allergies indicates:  No Known Allergies     Family History     Problem Relation (Age of Onset)    Arrhythmia Maternal Grandfather        Tobacco Use    Smoking status: Never Smoker    Smokeless tobacco: Never Used   Substance and Sexual Activity    Alcohol use: No     Frequency: Never    Drug use: No    Sexual activity: Yes      Partners: Male     Birth control/protection: None     Comment:      Review of Systems   All other systems reviewed and are negative.     Objective:     Vital Signs (Most Recent):  Temp: 98.1 °F (36.7 °C) (19 1406)  Pulse: (!) 57 (19 1549)  Resp: 18 (19 1406)  BP: (!) 140/66(Simultaneous filing. User may not have seen previous data.) (19 1545)  SpO2: 99 % (19 1549) Vital Signs (24h Range):        Weight: (!) 144.7 kg (319 lb)  Body mass index is 58.35 kg/m².    FHT: Cat 1 (reassuring)  TOCO:  Q  minutes    Physical Exam:   Constitutional: She appears well-nourished.       Cardiovascular: Normal rate, regular rhythm and normal heart sounds.     Pulmonary/Chest: Effort normal and breath sounds normal.        Abdominal: Soft.                       Significant Labs:  Lab Results   Component Value Date    GROUPTRH O POS 2018    HEPBSAG Negative 2018    STREPBCULT  2019     STREPTOCOCCUS AGALACTIAE (GROUP B)  Beta-hemolytic streptococci are routinely susceptible to   penicillins,cephalosporins and carbapenems.         I have personallly reviewed all pertinent lab results from the last 24 hours.    Assessment/Plan:     33 y.o. female  at 36w6d for:    No notes have been filed under this hospital service.  Service: Obstetrics and Gynecology      Patrick Garrido MD  Obstetrics  [facility]

## 2019-03-19 NOTE — SUBJECTIVE & OBJECTIVE
Obstetric HPI:  Patient reports  contractions, active fetal movement, No vaginal bleeding , No loss of fluid     This pregnancy has been complicated by     Obstetric History       T2      L2     SAB0   TAB0   Ectopic0   Multiple0   Live Births2       # Outcome Date GA Lbr Tru/2nd Weight Sex Delivery Anes PTL Lv   3 Current            2 Term 08 38w0d  4.309 kg (9 lb 8 oz) F CS-LTranv  N JASIEL      Name: Ryley   1 Term 00 41w0d  4.394 kg (9 lb 11 oz) F Vag-Spont  N JASIEL      Name: Silvia        Past Medical History:   Diagnosis Date    Advanced maternal age in multigravida, second trimester      Past Surgical History:   Procedure Laterality Date     SECTION      x1    CHOLECYSTECTOMY      THYROIDECTOMY, PARTIAL         No medications prior to admission.       Review of patient's allergies indicates:  No Known Allergies     Family History     Problem Relation (Age of Onset)    Arrhythmia Maternal Grandfather        Tobacco Use    Smoking status: Never Smoker    Smokeless tobacco: Never Used   Substance and Sexual Activity    Alcohol use: No     Frequency: Never    Drug use: No    Sexual activity: Yes     Partners: Male     Birth control/protection: None     Comment:      Review of Systems   All other systems reviewed and are negative.     Objective:     Vital Signs (Most Recent):  Temp: 98.1 °F (36.7 °C) (19 1406)  Pulse: (!) 57 (19 1549)  Resp: 18 (19 1406)  BP: (!) 140/66(Simultaneous filing. User may not have seen previous data.) (19 1545)  SpO2: 99 % (19 1549) Vital Signs (24h Range):        Weight: (!) 144.7 kg (319 lb)  Body mass index is 58.35 kg/m².    FHT: Cat 1 (reassuring)  TOCO:  Q  minutes    Physical Exam:   Constitutional: She appears well-nourished.       Cardiovascular: Normal rate, regular rhythm and normal heart sounds.     Pulmonary/Chest: Effort normal and breath sounds normal.        Abdominal: Soft.                        Significant Labs:  Lab Results   Component Value Date    GROUPTRH O POS 08/16/2018    HEPBSAG Negative 08/16/2018    STREPBCULT  03/13/2019     STREPTOCOCCUS AGALACTIAE (GROUP B)  Beta-hemolytic streptococci are routinely susceptible to   penicillins,cephalosporins and carbapenems.         I have personallly reviewed all pertinent lab results from the last 24 hours.

## 2019-03-20 ENCOUNTER — PROCEDURE VISIT (OUTPATIENT)
Dept: OBSTETRICS AND GYNECOLOGY | Facility: CLINIC | Age: 34
End: 2019-03-20
Payer: COMMERCIAL

## 2019-03-20 ENCOUNTER — ROUTINE PRENATAL (OUTPATIENT)
Dept: OBSTETRICS AND GYNECOLOGY | Facility: CLINIC | Age: 34
End: 2019-03-20
Payer: COMMERCIAL

## 2019-03-20 ENCOUNTER — TELEPHONE (OUTPATIENT)
Dept: OBSTETRICS AND GYNECOLOGY | Facility: CLINIC | Age: 34
End: 2019-03-20

## 2019-03-20 VITALS
BODY MASS INDEX: 58.53 KG/M2 | WEIGHT: 293 LBS | DIASTOLIC BLOOD PRESSURE: 86 MMHG | HEART RATE: 78 BPM | SYSTOLIC BLOOD PRESSURE: 130 MMHG

## 2019-03-20 DIAGNOSIS — Z34.83 ENCOUNTER FOR SUPERVISION OF OTHER NORMAL PREGNANCY IN THIRD TRIMESTER: Primary | ICD-10-CM

## 2019-03-20 DIAGNOSIS — Z3A.37 37 WEEKS GESTATION OF PREGNANCY: ICD-10-CM

## 2019-03-20 DIAGNOSIS — O99.213 MATERNAL MORBID OBESITY IN THIRD TRIMESTER, ANTEPARTUM: ICD-10-CM

## 2019-03-20 DIAGNOSIS — O09.523 ELDERLY MULTIGRAVIDA IN THIRD TRIMESTER: ICD-10-CM

## 2019-03-20 DIAGNOSIS — O09.522 AMA (ADVANCED MATERNAL AGE) MULTIGRAVIDA 35+, SECOND TRIMESTER: ICD-10-CM

## 2019-03-20 DIAGNOSIS — O34.10 UTERINE FIBROID IN PREGNANCY: ICD-10-CM

## 2019-03-20 DIAGNOSIS — O09.522 MATERNAL AGE 35+, MULTIGRAVIDA, SECOND TRIMESTER: ICD-10-CM

## 2019-03-20 DIAGNOSIS — Z98.891 HISTORY OF CESAREAN DELIVERY: ICD-10-CM

## 2019-03-20 DIAGNOSIS — E66.01 MATERNAL MORBID OBESITY IN THIRD TRIMESTER, ANTEPARTUM: ICD-10-CM

## 2019-03-20 DIAGNOSIS — D25.9 UTERINE FIBROID IN PREGNANCY: ICD-10-CM

## 2019-03-20 PROCEDURE — 0502F PR SUBSEQUENT PRENATAL CARE: ICD-10-PCS | Mod: CPTII,S$GLB,, | Performed by: OBSTETRICS & GYNECOLOGY

## 2019-03-20 PROCEDURE — 0502F SUBSEQUENT PRENATAL CARE: CPT | Mod: CPTII,S$GLB,, | Performed by: OBSTETRICS & GYNECOLOGY

## 2019-03-20 PROCEDURE — 99999 PR PBB SHADOW E&M-EST. PATIENT-LVL II: CPT | Mod: PBBFAC,,, | Performed by: OBSTETRICS & GYNECOLOGY

## 2019-03-20 PROCEDURE — 76819 PR US, OB, FETAL BIOPHYSICAL, W/O NST: ICD-10-PCS | Mod: S$GLB,,, | Performed by: OBSTETRICS & GYNECOLOGY

## 2019-03-20 PROCEDURE — 76819 FETAL BIOPHYS PROFIL W/O NST: CPT | Mod: S$GLB,,, | Performed by: OBSTETRICS & GYNECOLOGY

## 2019-03-20 PROCEDURE — 99999 PR PBB SHADOW E&M-EST. PATIENT-LVL II: ICD-10-PCS | Mod: PBBFAC,,, | Performed by: OBSTETRICS & GYNECOLOGY

## 2019-03-20 RX ORDER — CYCLOBENZAPRINE HCL 5 MG
5 TABLET ORAL 3 TIMES DAILY PRN
Qty: 20 TABLET | Refills: 0 | Status: ON HOLD | OUTPATIENT
Start: 2019-03-20 | End: 2019-03-29 | Stop reason: HOSPADM

## 2019-03-20 RX ORDER — HYDROCODONE BITARTRATE AND ACETAMINOPHEN 5; 325 MG/1; MG/1
1 TABLET ORAL EVERY 6 HOURS PRN
Qty: 6 TABLET | Refills: 0 | Status: ON HOLD | OUTPATIENT
Start: 2019-03-20 | End: 2019-03-29 | Stop reason: HOSPADM

## 2019-03-20 NOTE — PROGRESS NOTES
Patient with no complaints. Denies vaginal bleeding or contractions. Good FM. RTC in 1 week.     BPP 8/8. Discussed Pre E precautions.     Patient with significant neck pain and carpel tunnel symptoms. Rx of Flexeril given; if no improvement patient instructed she can take Norco very sporadically.

## 2019-03-26 ENCOUNTER — CLINICAL SUPPORT (OUTPATIENT)
Dept: OBSTETRICS AND GYNECOLOGY | Facility: CLINIC | Age: 34
End: 2019-03-26
Payer: COMMERCIAL

## 2019-03-26 ENCOUNTER — HOSPITAL ENCOUNTER (INPATIENT)
Facility: HOSPITAL | Age: 34
LOS: 3 days | Discharge: HOME | End: 2019-03-29
Attending: OBSTETRICS & GYNECOLOGY | Admitting: OBSTETRICS & GYNECOLOGY
Payer: COMMERCIAL

## 2019-03-26 ENCOUNTER — ANESTHESIA (OUTPATIENT)
Dept: SURGERY | Facility: HOSPITAL | Age: 34
End: 2019-03-26
Payer: COMMERCIAL

## 2019-03-26 ENCOUNTER — ANESTHESIA EVENT (OUTPATIENT)
Dept: SURGERY | Facility: HOSPITAL | Age: 34
End: 2019-03-26
Payer: COMMERCIAL

## 2019-03-26 ENCOUNTER — TELEPHONE (OUTPATIENT)
Dept: OBSTETRICS AND GYNECOLOGY | Facility: CLINIC | Age: 34
End: 2019-03-26

## 2019-03-26 VITALS — DIASTOLIC BLOOD PRESSURE: 104 MMHG | HEART RATE: 90 BPM | SYSTOLIC BLOOD PRESSURE: 168 MMHG

## 2019-03-26 DIAGNOSIS — Z98.891 HISTORY OF CESAREAN DELIVERY: ICD-10-CM

## 2019-03-26 DIAGNOSIS — O99.213 OBESITY AFFECTING PREGNANCY IN THIRD TRIMESTER: ICD-10-CM

## 2019-03-26 DIAGNOSIS — O34.10 UTERINE FIBROID IN PREGNANCY: ICD-10-CM

## 2019-03-26 DIAGNOSIS — O14.13 SEVERE PREECLAMPSIA, THIRD TRIMESTER: Primary | ICD-10-CM

## 2019-03-26 DIAGNOSIS — O09.522 AMA (ADVANCED MATERNAL AGE) MULTIGRAVIDA 35+, SECOND TRIMESTER: ICD-10-CM

## 2019-03-26 DIAGNOSIS — O16.3 ELEVATED BLOOD PRESSURE AFFECTING PREGNANCY IN THIRD TRIMESTER, ANTEPARTUM: ICD-10-CM

## 2019-03-26 DIAGNOSIS — D25.9 UTERINE FIBROID IN PREGNANCY: ICD-10-CM

## 2019-03-26 LAB
ABO + RH BLD: NORMAL
ALBUMIN SERPL BCP-MCNC: 2.5 G/DL (ref 3.5–5.2)
ALP SERPL-CCNC: 143 U/L (ref 55–135)
ALT SERPL W/O P-5'-P-CCNC: 18 U/L (ref 10–44)
ANION GAP SERPL CALC-SCNC: 8 MMOL/L (ref 8–16)
AST SERPL-CCNC: 13 U/L (ref 10–40)
BASOPHILS # BLD AUTO: 0.04 K/UL (ref 0–0.2)
BASOPHILS NFR BLD: 0.3 % (ref 0–1.9)
BILIRUB SERPL-MCNC: 0.4 MG/DL (ref 0.1–1)
BLD GP AB SCN CELLS X3 SERPL QL: NORMAL
BUN SERPL-MCNC: 8 MG/DL (ref 6–20)
CALCIUM SERPL-MCNC: 8.8 MG/DL (ref 8.7–10.5)
CHLORIDE SERPL-SCNC: 112 MMOL/L (ref 95–110)
CO2 SERPL-SCNC: 20 MMOL/L (ref 23–29)
CREAT SERPL-MCNC: 0.7 MG/DL (ref 0.5–1.4)
CREAT UR-MCNC: 272 MG/DL (ref 15–325)
DIFFERENTIAL METHOD: ABNORMAL
EOSINOPHIL # BLD AUTO: 0.2 K/UL (ref 0–0.5)
EOSINOPHIL NFR BLD: 1.4 % (ref 0–8)
ERYTHROCYTE [DISTWIDTH] IN BLOOD BY AUTOMATED COUNT: 14.9 % (ref 11.5–14.5)
EST. GFR  (AFRICAN AMERICAN): >60 ML/MIN/1.73 M^2
EST. GFR  (NON AFRICAN AMERICAN): >60 ML/MIN/1.73 M^2
GLUCOSE SERPL-MCNC: 77 MG/DL (ref 70–110)
HCT VFR BLD AUTO: 34.5 % (ref 37–48.5)
HGB BLD-MCNC: 11.2 G/DL (ref 12–16)
LDH SERPL L TO P-CCNC: 196 U/L (ref 110–260)
LYMPHOCYTES # BLD AUTO: 2.2 K/UL (ref 1–4.8)
LYMPHOCYTES NFR BLD: 18.2 % (ref 18–48)
MCH RBC QN AUTO: 27.5 PG (ref 27–31)
MCHC RBC AUTO-ENTMCNC: 32.5 G/DL (ref 32–36)
MCV RBC AUTO: 85 FL (ref 82–98)
MONOCYTES # BLD AUTO: 0.7 K/UL (ref 0.3–1)
MONOCYTES NFR BLD: 6.2 % (ref 4–15)
NEUTROPHILS # BLD AUTO: 8.8 K/UL (ref 1.8–7.7)
NEUTROPHILS NFR BLD: 73.9 % (ref 38–73)
PLATELET # BLD AUTO: 184 K/UL (ref 150–350)
PMV BLD AUTO: 12 FL (ref 9.2–12.9)
POTASSIUM SERPL-SCNC: 3.9 MMOL/L (ref 3.5–5.1)
PROT SERPL-MCNC: 5.9 G/DL (ref 6–8.4)
PROT UR-MCNC: 1396 MG/DL (ref 0–15)
PROT/CREAT UR: 5.13 MG/G{CREAT} (ref 0–0.2)
RBC # BLD AUTO: 4.07 M/UL (ref 4–5.4)
SODIUM SERPL-SCNC: 140 MMOL/L (ref 136–145)
URATE SERPL-MCNC: 6.5 MG/DL (ref 2.4–5.7)
WBC # BLD AUTO: 11.96 K/UL (ref 3.9–12.7)

## 2019-03-26 PROCEDURE — S0028 INJECTION, FAMOTIDINE, 20 MG: HCPCS | Performed by: OBSTETRICS & GYNECOLOGY

## 2019-03-26 PROCEDURE — 63600175 PHARM REV CODE 636 W HCPCS: Performed by: OBSTETRICS & GYNECOLOGY

## 2019-03-26 PROCEDURE — 25000003 PHARM REV CODE 250: Performed by: NURSE ANESTHETIST, CERTIFIED REGISTERED

## 2019-03-26 PROCEDURE — 83615 LACTATE (LD) (LDH) ENZYME: CPT

## 2019-03-26 PROCEDURE — 59510 PR FULL ROUT OBSTE CARE,CESAREAN DELIV: ICD-10-PCS | Mod: AT,,, | Performed by: OBSTETRICS & GYNECOLOGY

## 2019-03-26 PROCEDURE — 72100002 HC LABOR CARE, 1ST 8 HOURS

## 2019-03-26 PROCEDURE — 99999 PR PBB SHADOW E&M-EST. PATIENT-LVL I: CPT | Mod: PBBFAC,,,

## 2019-03-26 PROCEDURE — 51702 INSERT TEMP BLADDER CATH: CPT

## 2019-03-26 PROCEDURE — 25000003 PHARM REV CODE 250

## 2019-03-26 PROCEDURE — 37000009 HC ANESTHESIA EA ADD 15 MINS: Performed by: OBSTETRICS & GYNECOLOGY

## 2019-03-26 PROCEDURE — 82570 ASSAY OF URINE CREATININE: CPT

## 2019-03-26 PROCEDURE — 84550 ASSAY OF BLOOD/URIC ACID: CPT

## 2019-03-26 PROCEDURE — 01961 ANES CESAREAN DELIVERY ONLY: CPT | Mod: QZ,P3 | Performed by: NURSE ANESTHETIST, CERTIFIED REGISTERED

## 2019-03-26 PROCEDURE — 36000709 HC OR TIME LEV III EA ADD 15 MIN: Performed by: OBSTETRICS & GYNECOLOGY

## 2019-03-26 PROCEDURE — 59510 CESAREAN DELIVERY: CPT | Mod: AT,,, | Performed by: OBSTETRICS & GYNECOLOGY

## 2019-03-26 PROCEDURE — 80053 COMPREHEN METABOLIC PANEL: CPT

## 2019-03-26 PROCEDURE — 85025 COMPLETE CBC W/AUTO DIFF WBC: CPT

## 2019-03-26 PROCEDURE — 37000008 HC ANESTHESIA 1ST 15 MINUTES: Performed by: OBSTETRICS & GYNECOLOGY

## 2019-03-26 PROCEDURE — 86901 BLOOD TYPING SEROLOGIC RH(D): CPT

## 2019-03-26 PROCEDURE — 99999 PR PBB SHADOW E&M-EST. PATIENT-LVL I: ICD-10-PCS | Mod: PBBFAC,,,

## 2019-03-26 PROCEDURE — 11000001 HC ACUTE MED/SURG PRIVATE ROOM

## 2019-03-26 PROCEDURE — 36000708 HC OR TIME LEV III 1ST 15 MIN: Performed by: OBSTETRICS & GYNECOLOGY

## 2019-03-26 PROCEDURE — 86592 SYPHILIS TEST NON-TREP QUAL: CPT

## 2019-03-26 PROCEDURE — 63600175 PHARM REV CODE 636 W HCPCS: Performed by: NURSE ANESTHETIST, CERTIFIED REGISTERED

## 2019-03-26 PROCEDURE — 25000003 PHARM REV CODE 250: Performed by: OBSTETRICS & GYNECOLOGY

## 2019-03-26 PROCEDURE — 27000492 HC SLEEVE, SCD T/L

## 2019-03-26 RX ORDER — IBUPROFEN 800 MG/1
800 TABLET ORAL EVERY 8 HOURS
Status: DISCONTINUED | OUTPATIENT
Start: 2019-03-28 | End: 2019-03-27

## 2019-03-26 RX ORDER — HYDROMORPHONE HYDROCHLORIDE 2 MG/ML
1 INJECTION, SOLUTION INTRAMUSCULAR; INTRAVENOUS; SUBCUTANEOUS EVERY 4 HOURS PRN
Status: DISCONTINUED | OUTPATIENT
Start: 2019-03-26 | End: 2019-03-29 | Stop reason: HOSPADM

## 2019-03-26 RX ORDER — DIPHENHYDRAMINE HCL 25 MG
25 CAPSULE ORAL EVERY 4 HOURS PRN
Status: DISCONTINUED | OUTPATIENT
Start: 2019-03-26 | End: 2019-03-29 | Stop reason: HOSPADM

## 2019-03-26 RX ORDER — HYDROCORTISONE 25 MG/G
CREAM TOPICAL 3 TIMES DAILY PRN
Status: DISCONTINUED | OUTPATIENT
Start: 2019-03-26 | End: 2019-03-29 | Stop reason: HOSPADM

## 2019-03-26 RX ORDER — MAGNESIUM SULFATE HEPTAHYDRATE 40 MG/ML
2 INJECTION, SOLUTION INTRAVENOUS CONTINUOUS
Status: DISCONTINUED | OUTPATIENT
Start: 2019-03-26 | End: 2019-03-27

## 2019-03-26 RX ORDER — OXYCODONE AND ACETAMINOPHEN 10; 325 MG/1; MG/1
1 TABLET ORAL EVERY 4 HOURS PRN
Status: DISCONTINUED | OUTPATIENT
Start: 2019-03-26 | End: 2019-03-29 | Stop reason: HOSPADM

## 2019-03-26 RX ORDER — DEXAMETHASONE SODIUM PHOSPHATE 4 MG/ML
INJECTION, SOLUTION INTRA-ARTICULAR; INTRALESIONAL; INTRAMUSCULAR; INTRAVENOUS; SOFT TISSUE
Status: DISCONTINUED | OUTPATIENT
Start: 2019-03-26 | End: 2019-03-26

## 2019-03-26 RX ORDER — SIMETHICONE 80 MG
1 TABLET,CHEWABLE ORAL EVERY 6 HOURS PRN
Status: DISCONTINUED | OUTPATIENT
Start: 2019-03-26 | End: 2019-03-29 | Stop reason: HOSPADM

## 2019-03-26 RX ORDER — OXYTOCIN/RINGER'S LACTATE 20/1000 ML
41.65 PLASTIC BAG, INJECTION (ML) INTRAVENOUS CONTINUOUS
Status: ACTIVE | OUTPATIENT
Start: 2019-03-26 | End: 2019-03-27

## 2019-03-26 RX ORDER — METOCLOPRAMIDE HYDROCHLORIDE 5 MG/ML
10 INJECTION INTRAMUSCULAR; INTRAVENOUS
Status: DISCONTINUED | OUTPATIENT
Start: 2019-03-26 | End: 2019-03-29 | Stop reason: HOSPADM

## 2019-03-26 RX ORDER — AMOXICILLIN 250 MG
1 CAPSULE ORAL NIGHTLY PRN
Status: DISCONTINUED | OUTPATIENT
Start: 2019-03-26 | End: 2019-03-29 | Stop reason: HOSPADM

## 2019-03-26 RX ORDER — HYDRALAZINE HYDROCHLORIDE 20 MG/ML
5 INJECTION INTRAMUSCULAR; INTRAVENOUS ONCE
Status: COMPLETED | OUTPATIENT
Start: 2019-03-26 | End: 2019-03-26

## 2019-03-26 RX ORDER — SODIUM CITRATE AND CITRIC ACID MONOHYDRATE 334; 500 MG/5ML; MG/5ML
30 SOLUTION ORAL
Status: DISCONTINUED | OUTPATIENT
Start: 2019-03-26 | End: 2019-03-29 | Stop reason: HOSPADM

## 2019-03-26 RX ORDER — FAMOTIDINE 10 MG/ML
20 INJECTION INTRAVENOUS
Status: DISCONTINUED | OUTPATIENT
Start: 2019-03-26 | End: 2019-03-29 | Stop reason: HOSPADM

## 2019-03-26 RX ORDER — ONDANSETRON HYDROCHLORIDE 2 MG/ML
INJECTION, SOLUTION INTRAMUSCULAR; INTRAVENOUS
Status: DISCONTINUED | OUTPATIENT
Start: 2019-03-26 | End: 2019-03-26

## 2019-03-26 RX ORDER — KETOROLAC TROMETHAMINE 30 MG/ML
30 INJECTION, SOLUTION INTRAMUSCULAR; INTRAVENOUS EVERY 6 HOURS
Status: COMPLETED | OUTPATIENT
Start: 2019-03-27 | End: 2019-03-27

## 2019-03-26 RX ORDER — ACETAMINOPHEN 10 MG/ML
INJECTION, SOLUTION INTRAVENOUS
Status: DISCONTINUED | OUTPATIENT
Start: 2019-03-26 | End: 2019-03-26

## 2019-03-26 RX ORDER — MISOPROSTOL 200 UG/1
1000 TABLET ORAL ONCE
Status: DISCONTINUED | OUTPATIENT
Start: 2019-03-27 | End: 2019-03-27

## 2019-03-26 RX ORDER — SODIUM CHLORIDE, SODIUM LACTATE, POTASSIUM CHLORIDE, CALCIUM CHLORIDE 600; 310; 30; 20 MG/100ML; MG/100ML; MG/100ML; MG/100ML
INJECTION, SOLUTION INTRAVENOUS CONTINUOUS PRN
Status: DISCONTINUED | OUTPATIENT
Start: 2019-03-26 | End: 2019-03-26

## 2019-03-26 RX ORDER — MAGNESIUM SULFATE HEPTAHYDRATE 40 MG/ML
4 INJECTION, SOLUTION INTRAVENOUS ONCE
Status: COMPLETED | OUTPATIENT
Start: 2019-03-26 | End: 2019-03-26

## 2019-03-26 RX ORDER — FENTANYL CITRATE 50 UG/ML
INJECTION, SOLUTION INTRAMUSCULAR; INTRAVENOUS
Status: DISCONTINUED | OUTPATIENT
Start: 2019-03-26 | End: 2019-03-26

## 2019-03-26 RX ORDER — HYDRALAZINE HYDROCHLORIDE 20 MG/ML
5 INJECTION INTRAMUSCULAR; INTRAVENOUS ONCE AS NEEDED
Status: COMPLETED | OUTPATIENT
Start: 2019-03-26 | End: 2019-03-26

## 2019-03-26 RX ORDER — SODIUM CHLORIDE, SODIUM LACTATE, POTASSIUM CHLORIDE, CALCIUM CHLORIDE 600; 310; 30; 20 MG/100ML; MG/100ML; MG/100ML; MG/100ML
INJECTION, SOLUTION INTRAVENOUS CONTINUOUS
Status: DISCONTINUED | OUTPATIENT
Start: 2019-03-26 | End: 2019-03-27

## 2019-03-26 RX ORDER — OXYTOCIN 10 [USP'U]/ML
10 INJECTION, SOLUTION INTRAMUSCULAR; INTRAVENOUS ONCE
Status: COMPLETED | OUTPATIENT
Start: 2019-03-27 | End: 2019-03-26

## 2019-03-26 RX ORDER — LABETALOL HYDROCHLORIDE 5 MG/ML
20 INJECTION, SOLUTION INTRAVENOUS ONCE AS NEEDED
Status: COMPLETED | OUTPATIENT
Start: 2019-03-26 | End: 2019-03-26

## 2019-03-26 RX ORDER — OXYCODONE AND ACETAMINOPHEN 5; 325 MG/1; MG/1
1 TABLET ORAL EVERY 4 HOURS PRN
Status: DISCONTINUED | OUTPATIENT
Start: 2019-03-26 | End: 2019-03-29 | Stop reason: HOSPADM

## 2019-03-26 RX ORDER — ONDANSETRON 8 MG/1
8 TABLET, ORALLY DISINTEGRATING ORAL EVERY 8 HOURS PRN
Status: DISCONTINUED | OUTPATIENT
Start: 2019-03-26 | End: 2019-03-29 | Stop reason: HOSPADM

## 2019-03-26 RX ORDER — PHENYLEPHRINE HYDROCHLORIDE 10 MG/ML
INJECTION INTRAVENOUS
Status: DISCONTINUED | OUTPATIENT
Start: 2019-03-26 | End: 2019-03-26

## 2019-03-26 RX ORDER — OXYTOCIN/RINGER'S LACTATE 20/1000 ML
PLASTIC BAG, INJECTION (ML) INTRAVENOUS
Status: DISCONTINUED | OUTPATIENT
Start: 2019-03-26 | End: 2019-03-26

## 2019-03-26 RX ORDER — MISOPROSTOL 200 UG/1
1000 TABLET ORAL ONCE
Status: COMPLETED | OUTPATIENT
Start: 2019-03-26 | End: 2019-03-26

## 2019-03-26 RX ORDER — HYDRALAZINE HYDROCHLORIDE 20 MG/ML
10 INJECTION INTRAMUSCULAR; INTRAVENOUS ONCE AS NEEDED
Status: COMPLETED | OUTPATIENT
Start: 2019-03-26 | End: 2019-03-26

## 2019-03-26 RX ORDER — BUPIVACAINE HYDROCHLORIDE 7.5 MG/ML
INJECTION, SOLUTION EPIDURAL; RETROBULBAR
Status: COMPLETED | OUTPATIENT
Start: 2019-03-26 | End: 2019-03-26

## 2019-03-26 RX ORDER — DOCUSATE SODIUM 100 MG/1
200 CAPSULE, LIQUID FILLED ORAL 2 TIMES DAILY
Status: DISCONTINUED | OUTPATIENT
Start: 2019-03-26 | End: 2019-03-29 | Stop reason: HOSPADM

## 2019-03-26 RX ORDER — LABETALOL HYDROCHLORIDE 5 MG/ML
80 INJECTION, SOLUTION INTRAVENOUS ONCE AS NEEDED
Status: DISCONTINUED | OUTPATIENT
Start: 2019-03-26 | End: 2019-03-29 | Stop reason: HOSPADM

## 2019-03-26 RX ORDER — LABETALOL HYDROCHLORIDE 5 MG/ML
40 INJECTION, SOLUTION INTRAVENOUS ONCE AS NEEDED
Status: DISCONTINUED | OUTPATIENT
Start: 2019-03-26 | End: 2019-03-29 | Stop reason: HOSPADM

## 2019-03-26 RX ORDER — MUPIROCIN 20 MG/G
1 OINTMENT TOPICAL 2 TIMES DAILY
Status: DISCONTINUED | OUTPATIENT
Start: 2019-03-26 | End: 2019-03-28

## 2019-03-26 RX ORDER — MUPIROCIN 20 MG/G
OINTMENT TOPICAL
Status: DISCONTINUED | OUTPATIENT
Start: 2019-03-26 | End: 2019-03-28

## 2019-03-26 RX ORDER — CALCIUM GLUCONATE 98 MG/ML
1 INJECTION, SOLUTION INTRAVENOUS
Status: DISCONTINUED | OUTPATIENT
Start: 2019-03-26 | End: 2019-03-27

## 2019-03-26 RX ORDER — ACETAMINOPHEN 500 MG
500 TABLET ORAL EVERY 6 HOURS PRN
Status: ON HOLD | COMMUNITY
End: 2019-03-29 | Stop reason: HOSPADM

## 2019-03-26 RX ADMIN — FAMOTIDINE 20 MG: 10 INJECTION INTRAVENOUS at 05:03

## 2019-03-26 RX ADMIN — ONDANSETRON 8 MG: 2 INJECTION, SOLUTION INTRAMUSCULAR; INTRAVENOUS at 06:03

## 2019-03-26 RX ADMIN — OXYCODONE AND ACETAMINOPHEN 1 TABLET: 5; 325 TABLET ORAL at 09:03

## 2019-03-26 RX ADMIN — HYDRALAZINE HYDROCHLORIDE 5 MG: 20 INJECTION INTRAMUSCULAR; INTRAVENOUS at 05:03

## 2019-03-26 RX ADMIN — METOCLOPRAMIDE 10 MG: 5 INJECTION, SOLUTION INTRAMUSCULAR; INTRAVENOUS at 05:03

## 2019-03-26 RX ADMIN — Medication 1 ML: at 07:03

## 2019-03-26 RX ADMIN — Medication 1000 ML: at 07:03

## 2019-03-26 RX ADMIN — MISOPROSTOL 1000 MCG: 200 TABLET ORAL at 11:03

## 2019-03-26 RX ADMIN — PHENYLEPHRINE HYDROCHLORIDE 100 MCG: 10 INJECTION INTRAVENOUS at 06:03

## 2019-03-26 RX ADMIN — OXYTOCIN 10 UNITS: 10 INJECTION, SOLUTION INTRAMUSCULAR; INTRAVENOUS at 11:03

## 2019-03-26 RX ADMIN — Medication 25 MILLI-UNITS/MIN: at 11:03

## 2019-03-26 RX ADMIN — SODIUM CITRATE AND CITRIC ACID MONOHYDRATE 30 ML: 500; 334 SOLUTION ORAL at 05:03

## 2019-03-26 RX ADMIN — MAGNESIUM SULFATE IN WATER 2 G/HR: 40 INJECTION, SOLUTION INTRAVENOUS at 08:03

## 2019-03-26 RX ADMIN — DEXAMETHASONE SODIUM PHOSPHATE 4 MG: 4 INJECTION, SOLUTION INTRAMUSCULAR; INTRAVENOUS at 06:03

## 2019-03-26 RX ADMIN — HYDRALAZINE HYDROCHLORIDE 5 MG: 20 INJECTION INTRAMUSCULAR; INTRAVENOUS at 08:03

## 2019-03-26 RX ADMIN — HYDRALAZINE HYDROCHLORIDE 10 MG: 20 INJECTION INTRAMUSCULAR; INTRAVENOUS at 09:03

## 2019-03-26 RX ADMIN — LABETALOL HYDROCHLORIDE 20 MG: 5 INJECTION INTRAVENOUS at 09:03

## 2019-03-26 RX ADMIN — ACETAMINOPHEN 1000 MG: 10 INJECTION, SOLUTION INTRAVENOUS at 06:03

## 2019-03-26 RX ADMIN — MAGNESIUM SULFATE HEPTAHYDRATE 4 G: 40 INJECTION, SOLUTION INTRAVENOUS at 08:03

## 2019-03-26 RX ADMIN — BUPIVACAINE HYDROCHLORIDE 1.5 ML: 7.5 INJECTION, SOLUTION EPIDURAL; RETROBULBAR at 06:03

## 2019-03-26 RX ADMIN — FENTANYL CITRATE 25 MCG: 50 INJECTION, SOLUTION INTRAMUSCULAR; INTRAVENOUS at 06:03

## 2019-03-26 RX ADMIN — HYDROMORPHONE HYDROCHLORIDE 1 MG: 2 INJECTION, SOLUTION INTRAMUSCULAR; INTRAVENOUS; SUBCUTANEOUS at 11:03

## 2019-03-26 RX ADMIN — CEFAZOLIN SODIUM 100 ML: 2 SOLUTION INTRAVENOUS at 06:03

## 2019-03-26 RX ADMIN — KETOROLAC TROMETHAMINE 30 MG: 30 INJECTION INTRAMUSCULAR; INTRAVENOUS at 09:03

## 2019-03-26 RX ADMIN — SODIUM CHLORIDE, SODIUM LACTATE, POTASSIUM CHLORIDE, AND CALCIUM CHLORIDE: .6; .31; .03; .02 INJECTION, SOLUTION INTRAVENOUS at 06:03

## 2019-03-26 RX ADMIN — SODIUM CHLORIDE, SODIUM LACTATE, POTASSIUM CHLORIDE, AND CALCIUM CHLORIDE 1000 ML: .6; .31; .03; .02 INJECTION, SOLUTION INTRAVENOUS at 05:03

## 2019-03-26 RX ADMIN — Medication 333 MILLI-UNITS/MIN: at 10:03

## 2019-03-26 NOTE — ASSESSMENT & PLAN NOTE
Patient has known posterior uterine fibroid.    She ultimately desires hysterectomy after postpartum period.

## 2019-03-26 NOTE — ASSESSMENT & PLAN NOTE
Repeat  for delivery now.    Patient cleared for Anesthesia: Spinal    Anesthesia/Surgery risks, benefits, and alternative options discussed and understood by patient/fa

## 2019-03-26 NOTE — TELEPHONE ENCOUNTER
----- Message from Cynthia Garner MA sent at 3/26/2019 10:18 AM CDT -----  Contact: self  Deneen Borrego  MRN: [id number]  Home Phone      [phone]  Work Phone      Not on file.  Mobile          [phone]    Patient Care Team:  Go Corrigan MD as PCP - General (Family Medicine)  Josiane Mills MD (Obstetrics and Gynecology)  OB? Yes, 37w6d  What phone number can you be reached at?  [phone]  Message:   Stated has an appt with Dr Jarvis this Thursday.  Stated on last office visit Dr Jarvis advised her that if her bp was still high by appt on Thursday that Dr Jarvis would induce.  Stated bp today was 168/108 and has been high since last Sunday.  Would like to know if Dr Jarvis is going to want to induce on Thursday.

## 2019-03-26 NOTE — PROGRESS NOTES
Pt blood pressure was taken in the office today. Left arm 168/102 Right arm 168/104. Per Dr Albert, Pt instructed to go to L&D for evaluation, Ana in L&D aware.

## 2019-03-26 NOTE — HPI
Pt is a  @ 37 6/7 WGA who presented to clinic for a BP check and was noted to have severe range blood pressures.  She was sent to L&D for evaluation and BPs were noted to be persistently severe.  She denies HA, vision changes, or RUQ pain.  She denies contractions, vaginal bleeding, LOF.  Normal FM.

## 2019-03-26 NOTE — ANESTHESIA PREPROCEDURE EVALUATION
03/26/2019  Deneen Borrego is a 33 y.o., female.    Anesthesia Evaluation    I have reviewed the Patient Summary Reports.    I have reviewed the Nursing Notes.   I have reviewed the Medications.     Review of Systems  Anesthesia Hx:  No problems with previous Anesthesia  History of prior surgery of interest to airway management or planning:  Denies Personal Hx of Anesthesia complications.   Social:  Non-Smoker, No Alcohol Use    Hematology/Oncology:  Hematology Normal   Oncology Normal     EENT/Dental:EENT/Dental Normal   Cardiovascular:   Hypertension, poorly controlled PVD: sciatica.    Pulmonary:  Pulmonary Normal    Renal/:  Renal/ Normal     Hepatic/GI:  Hepatic/GI Normal    Musculoskeletal:  Musculoskeletal Normal    OB/GYN/PEDS:  Pre-eclampsia   Neurological:   sciatica   Endocrine:  Endocrine Normal    Dermatological:  Skin Normal    Psych:  Psychiatric Normal           Physical Exam  General:  Obesity    Airway/Jaw/Neck:  Airway Findings: Mouth Opening: Normal Tongue: Normal  General Airway Assessment: Adult  Mallampati: II  Jaw/Neck Findings:     Neck ROM: Normal ROM      Dental:  Dental Findings: In tact        Mental Status:  Mental Status Findings:  Cooperative, Alert and Oriented         Anesthesia Plan  Type of Anesthesia, risks & benefits discussed:  Anesthesia Type:  spinal, general  Patient's Preference:   Intra-op Monitoring Plan: standard ASA monitors  Intra-op Monitoring Plan Comments:   Post Op Pain Control Plan: multimodal analgesia  Post Op Pain Control Plan Comments:   Induction:    Beta Blocker:  Patient is not currently on a Beta-Blocker (No further documentation required).       Informed Consent: Patient understands risks and agrees with Anesthesia plan.  Questions answered. Anesthesia consent signed with patient.  ASA Score: 3  emergent   Day of Surgery Review of History &  Physical: I have interviewed and examined the patient. I have reviewed the patient's H&P dated: 3/26/19. There are no significant changes.  H&P update referred to the surgeon.         Ready For Surgery From Anesthesia Perspective.

## 2019-03-26 NOTE — HOSPITAL COURSE
Pt presented to L&D from clinic for BP monitoring and BP noted to be severe range.  She was treated with hydralazine and decision made to proceed with repeat  for severe gestational hypertension versus pre-eclampsia.    HD#2/POD#1 Routine post op care  HD#3/POD#2 Patient s/p MgSO4 for severe pre E. Currently asymptomatic. BPs increased to severe range in the pm and given a dose of IV hydralazine and started on po procardia.   Postop day 3.  Patient doing well on Procardia.  Patient ready for discharge home.

## 2019-03-26 NOTE — ASSESSMENT & PLAN NOTE
Pre-e labs so far are normal, will continue to follow as they result.   P/C ratio pending.    Will continue to treat elevated BP with antihypertensives as needed.   Proceed with delivery by repeat  now.    Will plan for postpartum magnesium sulfate.

## 2019-03-26 NOTE — ANESTHESIA PROCEDURE NOTES
Spinal    Diagnosis:  Section  Patient location during procedure: OR  Start time: 3/26/2019 6:27 PM  Timeout: 3/26/2019 6:25 PM  End time: 3/26/2019 6:31 PM  Staffing  Resident/CRNA: Micky Perez CRNA  Performed: resident/CRNA   Preanesthetic Checklist  Completed: patient identified, site marked, surgical consent, pre-op evaluation, timeout performed, IV checked, risks and benefits discussed and monitors and equipment checked  Spinal Block  Patient position: sitting  Prep: ChloraPrep  Patient monitoring: heart rate and continuous pulse ox  Approach: midline  Location: L3-4  Injection technique: single shot  CSF Fluid: clear free-flowing CSF  Needle  Needle type: pencil-tip   Needle gauge: 25 G  Needle length: 3.5 in  Additional Documentation: incremental injection, negative aspiration for heme and no paresthesia on injection  Needle localization: anatomical landmarks  Assessment  Sensory level: T4   Dermatomal levels determined by alcohol wipe and pinch or prick  Ease of block: easy  Patient's tolerance of the procedure: comfortable throughout block and no complaints  Additional Notes  25mcg intrathecal fentanyl with injection

## 2019-03-26 NOTE — H&P
[facility]  Obstetrics  History & Physical    Patient Name: Deneen Borrego  MRN: [id number]  Admission Date: 3/26/2019  Primary Care Provider: Go Corrigan MD    Subjective:     Principal Problem:Elevated blood pressure affecting pregnancy in third trimester, antepartum    History of Present Illness:  Pt is a  @ 37 6/7 WGA who presented to clinic for a BP check and was noted to have severe range blood pressures.  She was sent to L&D for evaluation and BPs were noted to be persistently severe.  She denies HA, vision changes, or RUQ pain.  She denies contractions, vaginal bleeding, LOF.  Normal FM.     Obstetric HPI:  Patient reports no contractions, active fetal movement, No vaginal bleeding , No loss of fluid     This pregnancy has been complicated by h/o  and h/o elevated blood pressures this pregnancy. She has a posterior uterine fibroid. She is AMA with normal cffDNA.    OB History    Para Term  AB Living   3 2 2 0 0 2   SAB TAB Ectopic Multiple Live Births   0 0 0 0 2      # Outcome Date GA Lbr Tru/2nd Weight Sex Delivery Anes PTL Lv   3 Current            2 Term 08 38w0d  4.309 kg (9 lb 8 oz) F CS-LTranv  N JASIEL      Name: Ryley   1 Term 00 41w0d  4.394 kg (9 lb 11 oz) F Vag-Spont  N JASIEL      Name: Silvia     Past Medical History:   Diagnosis Date    Advanced maternal age in multigravida, second trimester      Past Surgical History:   Procedure Laterality Date     SECTION      x1    CHOLECYSTECTOMY      THYROIDECTOMY, PARTIAL         PTA Medications   Medication Sig    acetaminophen (TYLENOL) 500 MG tablet Take 500 mg by mouth every 6 (six) hours as needed for Pain.    cyclobenzaprine (FLEXERIL) 5 MG tablet Take 1 tablet (5 mg total) by mouth 3 (three) times daily as needed for Muscle spasms.    HYDROcodone-acetaminophen (NORCO) 5-325 mg per tablet Take 1 tablet by mouth every 6 (six) hours as needed for Pain.    levothyroxine  (SYNTHROID) 50 MCG tablet Take 50 mcg by mouth once daily.    nystatin (MYCOSTATIN) cream Apply topically 2 (two) times daily.    omeprazole (PRILOSEC) 20 MG capsule Take 20 mg by mouth once daily.    prenatal 25/iron fum/folic/dha (PRENATAL-1 ORAL) Take by mouth.    pseudoephedrine HCl (SUDAFED ORAL) Take by mouth.       Review of patient's allergies indicates:  No Known Allergies     Family History     Problem Relation (Age of Onset)    Arrhythmia Maternal Grandfather        Tobacco Use    Smoking status: Never Smoker    Smokeless tobacco: Never Used   Substance and Sexual Activity    Alcohol use: No     Frequency: Never    Drug use: No    Sexual activity: Yes     Partners: Male     Birth control/protection: None     Comment:      Review of Systems   Constitutional: Negative for activity change, appetite change, chills, diaphoresis, fatigue and fever.   Eyes: Negative for visual disturbance.   Respiratory: Negative for cough, shortness of breath and wheezing.    Cardiovascular: Negative for chest pain and palpitations.   Gastrointestinal: Negative for abdominal pain, constipation, diarrhea, nausea and vomiting.   Genitourinary: Negative for dysuria, genital sores, pelvic pain, urgency, vaginal bleeding, vaginal discharge, vaginal pain and vaginal odor.   Musculoskeletal: Negative for back pain, joint swelling and myalgias.   Integumentary:  Negative for rash.   Neurological: Negative for seizures, syncope, numbness and headaches.   Hematological: Negative for adenopathy. Does not bruise/bleed easily.   Psychiatric/Behavioral: Negative for depression. The patient is not nervous/anxious.       Objective:     Vital Signs (Most Recent):  Temp: 97.7 °F (36.5 °C) (03/26/19 1651)  Pulse: 67 (03/26/19 1656)  Resp: 18 (03/26/19 1651)  BP: (Abnormal) 157/79 (03/26/19 1651)  SpO2: 97 % (03/26/19 1656) Vital Signs (24h Range):  Temp:  [97.7 °F (36.5 °C)] 97.7 °F (36.5 °C)  Pulse:  [67-90] 67  Resp:  [18]  18  SpO2:  [97 %] 97 %  BP: (157-168)/() 157/79        There is no height or weight on file to calculate BMI.    FHT: 150 Cat 1 (reassuring)  TOCO: irregular contractions    Physical Exam:   Constitutional: She is oriented to person, place, and time. She appears well-developed and well-nourished. No distress.    HENT:   Head: Normocephalic and atraumatic.    Eyes: Conjunctivae and EOM are normal.    Neck: Normal range of motion. Neck supple.     Pulmonary/Chest: Effort normal.                  Musculoskeletal: Normal range of motion.       Neurological: She is alert and oriented to person, place, and time.    Skin: Skin is warm and dry.    Psychiatric: She has a normal mood and affect. Her behavior is normal. Judgment and thought content normal.       Significant Labs:  Lab Results   Component Value Date    GROUPTRH O POS 2018    HEPBSAG Negative 2018    STREPBCULT  2019     STREPTOCOCCUS AGALACTIAE (GROUP B)  Beta-hemolytic streptococci are routinely susceptible to   penicillins,cephalosporins and carbapenems.         CBC:   Recent Labs   Lab 19  1712   WBC 11.96   RBC 4.07   HGB 11.2*   HCT 34.5*      MCV 85   MCH 27.5   MCHC 32.5     CMP:   Recent Labs   Lab 19  1712   GLU 77   CALCIUM 8.8   ALBUMIN 2.5*   PROT 5.9*      K 3.9   CO2 20*   *   BUN 8   CREATININE 0.7   ALKPHOS 143*   ALT 18   AST 13   BILITOT 0.4     Assessment/Plan:     33 y.o. female  at 37w6d for:    * Elevated blood pressure affecting pregnancy in third trimester, antepartum  Pre-e labs so far are normal, will continue to follow as they result.   P/C ratio pending.    Will continue to treat elevated BP with antihypertensives as needed.   Proceed with delivery by repeat  now.    Will plan for postpartum magnesium sulfate.     AMA (advanced maternal age) multigravida 35+, second trimester  Normal cffDNA    History of  delivery  Repeat  for delivery  now.    Patient cleared for Anesthesia: Spinal    Anesthesia/Surgery risks, benefits, and alternative options discussed and understood by patient/fa      Uterine fibroid in pregnancy  Patient has known posterior uterine fibroid.    She ultimately desires hysterectomy after postpartum period.         Miko Albert MD  Obstetrics  [facility]

## 2019-03-26 NOTE — SUBJECTIVE & OBJECTIVE
Obstetric HPI:  Patient reports no contractions, active fetal movement, No vaginal bleeding , No loss of fluid     This pregnancy has been complicated by h/o  and h/o elevated blood pressures this pregnancy. She has a posterior uterine fibroid. She is AMA with normal cffDNA.    OB History    Para Term  AB Living   3 2 2 0 0 2   SAB TAB Ectopic Multiple Live Births   0 0 0 0 2      # Outcome Date GA Lbr Tru/2nd Weight Sex Delivery Anes PTL Lv   3 Current            2 Term 08 38w0d  4.309 kg (9 lb 8 oz) F CS-LTranv  N JASIEL      Name: Ryley   1 Term 00 41w0d  4.394 kg (9 lb 11 oz) F Vag-Spont  N JASIEL      Name: Silvia     Past Medical History:   Diagnosis Date    Advanced maternal age in multigravida, second trimester      Past Surgical History:   Procedure Laterality Date     SECTION      x1    CHOLECYSTECTOMY      THYROIDECTOMY, PARTIAL         PTA Medications   Medication Sig    acetaminophen (TYLENOL) 500 MG tablet Take 500 mg by mouth every 6 (six) hours as needed for Pain.    cyclobenzaprine (FLEXERIL) 5 MG tablet Take 1 tablet (5 mg total) by mouth 3 (three) times daily as needed for Muscle spasms.    HYDROcodone-acetaminophen (NORCO) 5-325 mg per tablet Take 1 tablet by mouth every 6 (six) hours as needed for Pain.    levothyroxine (SYNTHROID) 50 MCG tablet Take 50 mcg by mouth once daily.    nystatin (MYCOSTATIN) cream Apply topically 2 (two) times daily.    omeprazole (PRILOSEC) 20 MG capsule Take 20 mg by mouth once daily.    prenatal 25/iron fum/folic/dha (PRENATAL-1 ORAL) Take by mouth.    pseudoephedrine HCl (SUDAFED ORAL) Take by mouth.       Review of patient's allergies indicates:  No Known Allergies     Family History     Problem Relation (Age of Onset)    Arrhythmia Maternal Grandfather        Tobacco Use    Smoking status: Never Smoker    Smokeless tobacco: Never Used   Substance and Sexual Activity    Alcohol use: No     Frequency: Never     Drug use: No    Sexual activity: Yes     Partners: Male     Birth control/protection: None     Comment:      Review of Systems   Constitutional: Negative for activity change, appetite change, chills, diaphoresis, fatigue and fever.   Eyes: Negative for visual disturbance.   Respiratory: Negative for cough, shortness of breath and wheezing.    Cardiovascular: Negative for chest pain and palpitations.   Gastrointestinal: Negative for abdominal pain, constipation, diarrhea, nausea and vomiting.   Genitourinary: Negative for dysuria, genital sores, pelvic pain, urgency, vaginal bleeding, vaginal discharge, vaginal pain and vaginal odor.   Musculoskeletal: Negative for back pain, joint swelling and myalgias.   Integumentary:  Negative for rash.   Neurological: Negative for seizures, syncope, numbness and headaches.   Hematological: Negative for adenopathy. Does not bruise/bleed easily.   Psychiatric/Behavioral: Negative for depression. The patient is not nervous/anxious.       Objective:     Vital Signs (Most Recent):  Temp: 97.7 °F (36.5 °C) (03/26/19 1651)  Pulse: 67 (03/26/19 1656)  Resp: 18 (03/26/19 1651)  BP: (Abnormal) 157/79 (03/26/19 1651)  SpO2: 97 % (03/26/19 1656) Vital Signs (24h Range):  Temp:  [97.7 °F (36.5 °C)] 97.7 °F (36.5 °C)  Pulse:  [67-90] 67  Resp:  [18] 18  SpO2:  [97 %] 97 %  BP: (157-168)/() 157/79        There is no height or weight on file to calculate BMI.    FHT: 150 Cat 1 (reassuring)  TOCO: irregular contractions    Physical Exam:   Constitutional: She is oriented to person, place, and time. She appears well-developed and well-nourished. No distress.    HENT:   Head: Normocephalic and atraumatic.    Eyes: Conjunctivae and EOM are normal.    Neck: Normal range of motion. Neck supple.     Pulmonary/Chest: Effort normal.                  Musculoskeletal: Normal range of motion.       Neurological: She is alert and oriented to person, place, and time.    Skin: Skin is warm and  dry.    Psychiatric: She has a normal mood and affect. Her behavior is normal. Judgment and thought content normal.       Significant Labs:  Lab Results   Component Value Date    GROUPTRH O POS 08/16/2018    HEPBSAG Negative 08/16/2018    STREPBCULT  03/13/2019     STREPTOCOCCUS AGALACTIAE (GROUP B)  Beta-hemolytic streptococci are routinely susceptible to   penicillins,cephalosporins and carbapenems.         CBC:   Recent Labs   Lab 03/26/19  1712   WBC 11.96   RBC 4.07   HGB 11.2*   HCT 34.5*      MCV 85   MCH 27.5   MCHC 32.5     CMP:   Recent Labs   Lab 03/26/19  1712   GLU 77   CALCIUM 8.8   ALBUMIN 2.5*   PROT 5.9*      K 3.9   CO2 20*   *   BUN 8   CREATININE 0.7   ALKPHOS 143*   ALT 18   AST 13   BILITOT 0.4

## 2019-03-27 LAB
BASOPHILS # BLD AUTO: 0.04 K/UL (ref 0–0.2)
BASOPHILS NFR BLD: 0.2 % (ref 0–1.9)
DIFFERENTIAL METHOD: ABNORMAL
EOSINOPHIL # BLD AUTO: 0 K/UL (ref 0–0.5)
EOSINOPHIL NFR BLD: 0.1 % (ref 0–8)
ERYTHROCYTE [DISTWIDTH] IN BLOOD BY AUTOMATED COUNT: 15 % (ref 11.5–14.5)
HCT VFR BLD AUTO: 30.3 % (ref 37–48.5)
HGB BLD-MCNC: 9.7 G/DL (ref 12–16)
LYMPHOCYTES # BLD AUTO: 1.8 K/UL (ref 1–4.8)
LYMPHOCYTES NFR BLD: 11.1 % (ref 18–48)
MAGNESIUM SERPL-MCNC: 4.2 MG/DL (ref 1.6–2.6)
MAGNESIUM SERPL-MCNC: 5.4 MG/DL (ref 1.6–2.6)
MAGNESIUM SERPL-MCNC: 5.6 MG/DL (ref 1.6–2.6)
MAGNESIUM SERPL-MCNC: 5.7 MG/DL (ref 1.6–2.6)
MCH RBC QN AUTO: 27.2 PG (ref 27–31)
MCHC RBC AUTO-ENTMCNC: 32 G/DL (ref 32–36)
MCV RBC AUTO: 85 FL (ref 82–98)
MONOCYTES # BLD AUTO: 0.8 K/UL (ref 0.3–1)
MONOCYTES NFR BLD: 4.8 % (ref 4–15)
NEUTROPHILS # BLD AUTO: 13.9 K/UL (ref 1.8–7.7)
NEUTROPHILS NFR BLD: 83.8 % (ref 38–73)
PLATELET # BLD AUTO: 192 K/UL (ref 150–350)
PMV BLD AUTO: 11.9 FL (ref 9.2–12.9)
RBC # BLD AUTO: 3.56 M/UL (ref 4–5.4)
RPR SER QL: NORMAL
WBC # BLD AUTO: 16.55 K/UL (ref 3.9–12.7)

## 2019-03-27 PROCEDURE — 11000001 HC ACUTE MED/SURG PRIVATE ROOM

## 2019-03-27 PROCEDURE — 83735 ASSAY OF MAGNESIUM: CPT

## 2019-03-27 PROCEDURE — 25000003 PHARM REV CODE 250: Performed by: OBSTETRICS & GYNECOLOGY

## 2019-03-27 PROCEDURE — 85025 COMPLETE CBC W/AUTO DIFF WBC: CPT

## 2019-03-27 PROCEDURE — 63600175 PHARM REV CODE 636 W HCPCS: Performed by: OBSTETRICS & GYNECOLOGY

## 2019-03-27 PROCEDURE — 83735 ASSAY OF MAGNESIUM: CPT | Mod: 91

## 2019-03-27 PROCEDURE — 36415 COLL VENOUS BLD VENIPUNCTURE: CPT

## 2019-03-27 RX ORDER — IBUPROFEN 800 MG/1
800 TABLET ORAL EVERY 8 HOURS
Status: DISCONTINUED | OUTPATIENT
Start: 2019-03-27 | End: 2019-03-29 | Stop reason: HOSPADM

## 2019-03-27 RX ORDER — LABETALOL HYDROCHLORIDE 5 MG/ML
20 INJECTION, SOLUTION INTRAVENOUS ONCE
Status: COMPLETED | OUTPATIENT
Start: 2019-03-27 | End: 2019-03-27

## 2019-03-27 RX ADMIN — Medication 25 MILLI-UNITS/MIN: at 12:03

## 2019-03-27 RX ADMIN — SODIUM CHLORIDE, SODIUM LACTATE, POTASSIUM CHLORIDE, AND CALCIUM CHLORIDE: .6; .31; .03; .02 INJECTION, SOLUTION INTRAVENOUS at 01:03

## 2019-03-27 RX ADMIN — KETOROLAC TROMETHAMINE 30 MG: 30 INJECTION INTRAMUSCULAR; INTRAVENOUS at 03:03

## 2019-03-27 RX ADMIN — MAGNESIUM SULFATE IN WATER 2 G/HR: 40 INJECTION, SOLUTION INTRAVENOUS at 02:03

## 2019-03-27 RX ADMIN — OXYCODONE HYDROCHLORIDE AND ACETAMINOPHEN 1 TABLET: 10; 325 TABLET ORAL at 04:03

## 2019-03-27 RX ADMIN — DOCUSATE SODIUM 200 MG: 100 CAPSULE, LIQUID FILLED ORAL at 09:03

## 2019-03-27 RX ADMIN — IBUPROFEN 800 MG: 800 TABLET ORAL at 11:03

## 2019-03-27 RX ADMIN — SIMETHICONE CHEW TAB 80 MG 80 MG: 80 TABLET ORAL at 08:03

## 2019-03-27 RX ADMIN — DOCUSATE SODIUM 200 MG: 100 CAPSULE, LIQUID FILLED ORAL at 08:03

## 2019-03-27 RX ADMIN — OXYCODONE AND ACETAMINOPHEN 1 TABLET: 5; 325 TABLET ORAL at 08:03

## 2019-03-27 RX ADMIN — KETOROLAC TROMETHAMINE 30 MG: 30 INJECTION INTRAMUSCULAR; INTRAVENOUS at 09:03

## 2019-03-27 RX ADMIN — LABETALOL HYDROCHLORIDE 20 MG: 5 INJECTION INTRAVENOUS at 05:03

## 2019-03-27 NOTE — LACTATION NOTE
This note was copied from a baby's chart.     03/27/19 1430   Maternal Information   Date of Referral 03/27/19   Person Making Referral nurse   Infant Reason for Referral other (see comments)  (can't latch baby)   Maternal Infant Feeding   Maternal Emotional State relaxed;assist needed   Infant Positioning cross-cradle;clutch/football;laid back (ventral)   Comfort Measures Following Feeding air-drying encouraged;expressed milk applied;soap use discouraged;water cleansing encouraged   Latch Assistance yes   Breastfeeding Supplementation   Supplementation Post Delivery yes   Breastfeeding Supplementation Type expressed breast milk   Method of Supplementation spoon     Nurse calls for help assisting this mom & baby to Latch. Attempted x 15 minutes in numerous positions. Baby will not open wide when rooting.  Keeping tongue high. Helped mom hand express a complete spoonful and spoon fed to baby. Recommended monitoring for cues and if no cues by 1730 then unwrap and place skin to skin. Will check back with mom then.

## 2019-03-27 NOTE — TRANSFER OF CARE
"Anesthesia Transfer of Care Note    Patient: Deneen Borrego    Procedure(s) Performed: Procedure(s) (LRB):   SECTION (N/A)    Patient location: Labor and Delivery    Anesthesia Type: spinal    Transport from OR: Transported from OR on room air with adequate spontaneous ventilation    Post pain: adequate analgesia    Post assessment: no apparent anesthetic complications and tolerated procedure well    Post vital signs: stable    Level of consciousness: awake, alert and oriented    Nausea/Vomiting: no nausea/vomiting    Complications: none    Transfer of care protocol was followed      Last vitals:   Visit Vitals  BP (!) 157/79   Pulse 67   Temp 36.5 °C (97.7 °F)   Resp 18   Ht 5' 2" (1.575 m)   Wt (!) 145.2 kg (320 lb)   LMP 2018   SpO2 97%   Breastfeeding? Yes   BMI 58.53 kg/m²     "

## 2019-03-27 NOTE — LACTATION NOTE
This note was copied from a baby's chart.     03/27/19 6615   Maternal Information   Date of Referral 03/27/19   Person Making Referral nurse   Maternal Reason for Referral breastfeeding unsuccessful in past   Maternal Assessment   Breast Size Issue none   Breast Shape Bilateral:;angled;wide   Breast Density Bilateral:;soft   Areola Bilateral:;elastic   Nipples Bilateral:;everted;graspable   Maternal Infant Feeding   Maternal Emotional State relaxed;assist needed   Infant Positioning cross-cradle;clutch/football   Signs of Milk Transfer audible swallow;infant jaw motion present   Pain with Feeding no   Nipple Shape After Feeding, Right everted   Latch Assistance yes   Lactation Referrals   Lactation Referrals outpatient lactation program;support group   Outpatient Lactation Program Lactation Follow-up Date/Time Monday 4/1/19   Support Group Lactation Follow-up Date/Time 2019 flyer     Routine visit completed at this time. Mom bottle fed her first baby. Breast fed the second for 3-4 days then switched to pump and bottle related to inability to LATCH by herself. Will continue to offer support & encouragement.   Assessed feeding. Assistance needed with positioning and alignment. Mom has difficulty holding baby close. History of Fibromyalgia, gestational hypertension, and hypothyroidism. Synthroid in use- L1. Education provided on latch, positioning,milk transfer and importance of baby led feeding on cue (8 or more times daily) and use of hand expression. LATCH score complete. Reviewed the risk associated with use of pacifiers and reasons to avoid artificial nipples. Encouraged mother to use Breastfeeding Guide to track feedings and output. Questions/ Concerns answered. Mother verbalizes understanding.   Used Breastfeeding Guide and reviewed first alert form, importance/ benefits of exclusive breastfeeding for 6 months, proper handling and storage of breast milk, and all resources available after leaving the hospital.  Questions/ Concerns answered. Mother verbalized understanding.

## 2019-03-27 NOTE — NURSING
At 2320 RN to room to assess pt, do pericare and gown change and binder.  At this time large amount of blood noted on pad which had been changed at 2200.  Pt layed flat and fundal massage started.  Uterus boggy firming with massage.  Constant trickle of blood.  Dr. Albert called and notified of situation and pt blood pressures since delivery.  Orders received.   Bleeding stops after interventions.  Pads changed, gown changed, abd binder applied.  Pt given all medications per order (see MAR)

## 2019-03-27 NOTE — PLAN OF CARE
03/27/19 0748   Advance Directives (For Healthcare)   Advance Directive  (If Adv Dir status is received, view document under Code in header or Chart Review Media tab) Patient does not have Advance Directive, declines information.

## 2019-03-27 NOTE — NURSING
Md called again.  Spoke with Dr. Garrido and notified of systolic BPs to be elevated over 160 and need orders for BP medication to be given for labetalol 20mg,  MD ordered for medication to be given.

## 2019-03-27 NOTE — PLAN OF CARE
03/27/19 0745   Discharge Assessment   Assessment Type Discharge Planning Assessment   Assessment information obtained from? Medical Record   Expected Length of Stay (days) 3   Communicated expected length of stay with patient/caregiver yes   Prior to hospitilization cognitive status: Alert/Oriented   Prior to hospitalization functional status: Independent   Lives With child(jorge luis), dependent;spouse   Able to Return to Prior Arrangements yes   Is patient able to care for self after discharge? Yes   Who are your caregiver(s) and their phone number(s)? Go [phone]   Patient's perception of discharge disposition home or selfcare   Readmission Within the Last 30 Days no previous admission in last 30 days   Patient currently being followed by outpatient case management? No   Patient currently receives home health services? No   Equipment Currently Used at Home none   Do you have any problems affording any of your prescribed medications? No   Is the patient taking medications as prescribed? yes   Does the patient have transportation home? Yes   Transportation Anticipated family or friend will provide   Discharge Plan A Home   Discharge Plan B Home with family   DME Needed Upon Discharge  none   Patient/Family in Agreement with Plan yes         Patient admitted for delivery of baby. No CM needs or concerns identified at this time. CM will continue to follow and assist as needed.

## 2019-03-27 NOTE — ANESTHESIA POSTPROCEDURE EVALUATION
Anesthesia Post Evaluation    Patient: Deneen Borrego    Procedure(s) Performed: Procedure(s) (LRB):   SECTION (N/A)    Final Anesthesia Type: spinal  Patient location during evaluation: labor & delivery  Patient participation: Yes- Able to Participate  Level of consciousness: awake and alert and oriented  Post-procedure vital signs: reviewed and stable  Pain management: adequate  Airway patency: patent  PONV status at discharge: No PONV  Anesthetic complications: no      Cardiovascular status: blood pressure returned to baseline and hemodynamically stable  Respiratory status: unassisted and spontaneous ventilation  Hydration status: euvolemic  Follow-up not needed.          Vitals Value Taken Time   /86 3/26/2019  8:02 PM   Temp 36.5 °C (97.7 °F) 3/26/2019  4:51 PM   Pulse 88 3/26/2019  8:03 PM   Resp 18 3/26/2019  4:51 PM   SpO2 96 % 3/26/2019  8:03 PM   Vitals shown include unvalidated device data.      No case tracking events are documented in the log.      Pain/Garcia Score: No data recorded

## 2019-03-27 NOTE — PLAN OF CARE
Problem: Adult Inpatient Plan of Care  Goal: Plan of Care Review  Outcome: Ongoing (interventions implemented as appropriate)  Pt is stable and vitals are now WNL.  Pt systolic BP was over 160s 2 consecutive times so labetalol 20 mg was given per MD order around 1730.  Since vitals have been stable.  Pt states that now BPs have lowered that HA has decreased.  No visual changes, RUQ/epigastric pain, or decreased LOC noted during shift.  Pt has started to have increased diuresis per craven catheter.  Pt has been able to tolerate regular diet. Pain has been controlled with only IV toradol. Pt has now moved to oral motrin per order.

## 2019-03-27 NOTE — NURSING
MD called to notify of 2 consecutive BP systolics over 160.  MD will call unit back with orders. Secure chat also sent to MD

## 2019-03-27 NOTE — NURSING
Pt states that HA is getting better since BP medication was given and BPs have decreased.  No visual changes or RUQ/epigastric pain noted.

## 2019-03-28 PROCEDURE — 11000001 HC ACUTE MED/SURG PRIVATE ROOM

## 2019-03-28 PROCEDURE — 63600175 PHARM REV CODE 636 W HCPCS: Performed by: OBSTETRICS & GYNECOLOGY

## 2019-03-28 PROCEDURE — 25000003 PHARM REV CODE 250: Performed by: OBSTETRICS & GYNECOLOGY

## 2019-03-28 RX ORDER — HYDRALAZINE HYDROCHLORIDE 20 MG/ML
5 INJECTION INTRAMUSCULAR; INTRAVENOUS ONCE
Status: COMPLETED | OUTPATIENT
Start: 2019-03-28 | End: 2019-03-28

## 2019-03-28 RX ORDER — NIFEDIPINE 30 MG/1
30 TABLET, EXTENDED RELEASE ORAL DAILY
Status: DISCONTINUED | OUTPATIENT
Start: 2019-03-28 | End: 2019-03-29 | Stop reason: HOSPADM

## 2019-03-28 RX ADMIN — IBUPROFEN 800 MG: 800 TABLET ORAL at 09:03

## 2019-03-28 RX ADMIN — HYDRALAZINE HYDROCHLORIDE 5 MG: 20 INJECTION INTRAMUSCULAR; INTRAVENOUS at 04:03

## 2019-03-28 RX ADMIN — IBUPROFEN 800 MG: 800 TABLET ORAL at 04:03

## 2019-03-28 RX ADMIN — DOCUSATE SODIUM 200 MG: 100 CAPSULE, LIQUID FILLED ORAL at 09:03

## 2019-03-28 RX ADMIN — OXYCODONE AND ACETAMINOPHEN 1 TABLET: 5; 325 TABLET ORAL at 01:03

## 2019-03-28 RX ADMIN — IBUPROFEN 800 MG: 800 TABLET ORAL at 08:03

## 2019-03-28 RX ADMIN — SIMETHICONE CHEW TAB 80 MG 80 MG: 80 TABLET ORAL at 05:03

## 2019-03-28 RX ADMIN — DOCUSATE SODIUM 200 MG: 100 CAPSULE, LIQUID FILLED ORAL at 08:03

## 2019-03-28 RX ADMIN — NIFEDIPINE 30 MG: 30 TABLET, FILM COATED, EXTENDED RELEASE ORAL at 04:03

## 2019-03-28 RX ADMIN — OXYCODONE HYDROCHLORIDE AND ACETAMINOPHEN 1 TABLET: 10; 325 TABLET ORAL at 12:03

## 2019-03-28 NOTE — NURSING
LACTATION NOTE: Breastfeeding assistance given at every feeding. Mother is getting better with positioning and latching, but still needs help and guidance. LATCH score done. Father is keeping feeding log up to date. Hand expression done per nurse at the morning feeding because baby was alert, but not interested. Mother voiced that she doesn't do it very well, so I taught technique again and assured her it will get easier with time. Mother and father has been receptive to BF teaching.

## 2019-03-28 NOTE — PLAN OF CARE
Problem: Adult Inpatient Plan of Care  Goal: Plan of Care Review  Outcome: Ongoing (interventions implemented as appropriate)  Stable shift. Pt tolerated all medications and procedures well. V/S stable. NAD noted. Pt up in room and to bathroom w/ assistance needed. C/O inspectional and abdominal pain w/ relief from PO medications. See flow sheets for details. Plan of care reviewed w/ pt; pt states understanding. Pt attentive to infant during shift w/ assistance needed w/ BF.   Used Breastfeeding Guide and reviewed first alert form, importance/ benefits of exclusive breastfeeding for 6 months, proper handling and storage of breast milk, and all resources available after leaving the hospital. Questions/ Concerns answered. Mother verbalized understanding.   Education provided on latch, positioning,milk transfer and importance of baby led feeding on cue (8 or more times daily) and use of hand expression. LATCH score complete. Reviewed the risk associated with use of pacifiers and reasons to avoid artificial nipples. Encouraged mother to use Breastfeeding Guide to track feedings and output. Questions/ Concerns answered. Mother verbalizes understanding.   Reinforced benefits of skin to skin at birth and throughout hospital stay.  Questions/ Concerns answered, Mother verbalizes understanding.

## 2019-03-28 NOTE — LACTATION NOTE
This note was copied from a baby's chart.  Infant placed in football hold on 2 pillows in mother's arms. Infant unswaddled. Mother states infant had not liked this position for previous feedings. Infant latched easily. Mother states this has been the easiest latch of all. No distress noted. Mother encouraged to keep infant's head up towards breast and not let her hand relax so infant does not pull on nipple. Mother verbalized understanding. Reinforced Breastfeeding Guide and reviewed first alert form, importance/ benefits of exclusive breastfeeding for 6 months, proper handling and storage of breast milk, and all resources available after leaving the hospital. Reinforced benefits of skin to skin at birth and throughout hospital stay.  Questions/ Concerns answered, Mother verbalizes understanding.

## 2019-03-28 NOTE — PLAN OF CARE
"CASIANO noted when pt ambulates to BR. Pt states she has had CASIANO "for a while now, even when I was pregnant." BBS clear and equal, denies SOB while at rest, pulse ox 97% on RA, skin pink, warm and dry. Reported to Dr. Jarvis on rounds this AM. Will continue to monitor.  "

## 2019-03-28 NOTE — PLAN OF CARE
Mother and baby bonding well. Pt is ambulating in room, voiding without difficulty, and performing own self care. CASIANO has improved. She is tolerating a regular diet without emesis. Pain is being controlled with the use of PRN medications. BP is being monitored and all other vitals have been stable. Pt states no needs or concerns at this time. RC.

## 2019-03-28 NOTE — PLAN OF CARE
/79. Reported to Dr. Jarvis who stated to recheck in 1 hour. Pt resting quietly and has no complaints.

## 2019-03-28 NOTE — DISCHARGE INSTRUCTIONS
Postpartum Discharge Instructions:    · No heavy lifting, straining, frequent rest periods  · Pelvic rest--no douching, tampons, or intercourse until released by MD  · Talk to your doctor about birth control--remember breastfeeding is not a method of birth control  · Notify MD if bleeding becomes heavier than usual and if large clots, painful cramping,or foul odor develops.   Vaginal discharge will lighten and decrease in amount gradually.    · Cleanse perineal area from front to back after urination or having a bowel movement.  · If not breastfeeding, wear tight fitting sports bra for 1 week--remove only to bathe  · Remember to keep your breast clean and dry to prevent any cracking  · Notify MD if breast become reddened,swollen, nipples bleed or crack, or fever greater than 100.4  · Notify MD of pain, swelling,  Redness, or heat developing in back of leg especially when foot is flexed toward body  · Look at incision everyday for redness, swelling, or drainage which may indicate infection  · Notify MD of pain not relieved by pain medication.  · Call MD or go to ER for any concerns

## 2019-03-29 VITALS
WEIGHT: 293 LBS | HEIGHT: 62 IN | BODY MASS INDEX: 53.92 KG/M2 | OXYGEN SATURATION: 97 % | HEART RATE: 85 BPM | SYSTOLIC BLOOD PRESSURE: 143 MMHG | TEMPERATURE: 98 F | DIASTOLIC BLOOD PRESSURE: 76 MMHG | RESPIRATION RATE: 20 BRPM

## 2019-03-29 PROCEDURE — 99233 PR SUBSEQUENT HOSPITAL CARE,LEVL III: ICD-10-PCS | Mod: ,,, | Performed by: OBSTETRICS & GYNECOLOGY

## 2019-03-29 PROCEDURE — 25000003 PHARM REV CODE 250: Performed by: OBSTETRICS & GYNECOLOGY

## 2019-03-29 PROCEDURE — 99233 SBSQ HOSP IP/OBS HIGH 50: CPT | Mod: ,,, | Performed by: OBSTETRICS & GYNECOLOGY

## 2019-03-29 RX ORDER — IBUPROFEN 800 MG/1
800 TABLET ORAL EVERY 8 HOURS
Qty: 20 TABLET | Refills: 2 | Status: SHIPPED | OUTPATIENT
Start: 2019-03-29 | End: 2019-12-20

## 2019-03-29 RX ORDER — OXYCODONE AND ACETAMINOPHEN 5; 325 MG/1; MG/1
1 TABLET ORAL EVERY 4 HOURS PRN
Qty: 20 TABLET | Refills: 0 | Status: SHIPPED | OUTPATIENT
Start: 2019-03-29 | End: 2019-12-20

## 2019-03-29 RX ORDER — NIFEDIPINE 30 MG/1
30 TABLET, EXTENDED RELEASE ORAL DAILY
Qty: 30 TABLET | Refills: 11 | Status: SHIPPED | OUTPATIENT
Start: 2019-03-30 | End: 2019-04-01 | Stop reason: SDUPTHER

## 2019-03-29 RX ADMIN — IBUPROFEN 800 MG: 800 TABLET ORAL at 06:03

## 2019-03-29 RX ADMIN — NIFEDIPINE 30 MG: 30 TABLET, FILM COATED, EXTENDED RELEASE ORAL at 08:03

## 2019-03-29 RX ADMIN — DOCUSATE SODIUM 200 MG: 100 CAPSULE, LIQUID FILLED ORAL at 08:03

## 2019-03-29 RX ADMIN — OXYCODONE AND ACETAMINOPHEN 1 TABLET: 5; 325 TABLET ORAL at 11:03

## 2019-03-29 NOTE — PLAN OF CARE
Problem: Adult Inpatient Plan of Care  Goal: Plan of Care Review  Outcome: Outcome(s) achieved Date Met: 03/29/19  Ambulating, doing own self care,doing baby care with assist from , prn medications have been effective, denies any needs, concerns at present.

## 2019-03-29 NOTE — SUBJECTIVE & OBJECTIVE
Hospital course: Pt presented to L&D from clinic for BP monitoring and BP noted to be severe range.  She was treated with hydralazine and decision made to proceed with repeat  for severe gestational hypertension versus pre-eclampsia.    HD#2/POD#1 Routine post op care  HD#3/POD#2 Patient s/p MgSO4 for severe pre E. Currently asymptomatic. BPs increased to severe range in the pm and given a dose of IV hydralazine and started on po procardia.     Interval History:     She is doing well this morning. She is tolerating a regular diet without nausea or vomiting. She is voiding spontaneously. She is ambulating. She has passed flatus, and has not a BM. Vaginal bleeding is mild. She denies fever or chills. Abdominal pain is mild and controlled with oral medications. She is breastfeeding. She desires circumcision for her male baby: yes.    Objective:     Vital Signs (Most Recent):  Temp: 97 °F (36.1 °C) (19 1500)  Pulse: 86 (19 1815)  Resp: 16 (19 1500)  BP: (!) 146/76 (19 1815)  SpO2: 97 % (19 0721) Vital Signs (24h Range):  Temp:  [96.6 °F (35.9 °C)-98 °F (36.7 °C)] 97 °F (36.1 °C)  Pulse:  [72-89] 86  Resp:  [16-18] 16  SpO2:  [97 %] 97 %  BP: (133-151)/(73-87) 146/76     Weight: (!) 145.2 kg (320 lb)  Body mass index is 58.53 kg/m².      Intake/Output Summary (Last 24 hours) at 3/28/2019 2004  Last data filed at 3/28/2019 0500  Gross per 24 hour   Intake 1312.5 ml   Output 985 ml   Net 327.5 ml       Significant Labs:  Lab Results   Component Value Date    GROUPTRH O POS 2019    HEPBSAG Negative 2018    STREPBCULT  2019     STREPTOCOCCUS AGALACTIAE (GROUP B)  Beta-hemolytic streptococci are routinely susceptible to   penicillins,cephalosporins and carbapenems.       Recent Labs   Lab 19  0625   HGB 9.7*   HCT 30.3*       I have personallly reviewed all pertinent lab results from the last 24 hours.    Physical Exam    Chest: Clear to auscultation   Breasts:  Soft. Nontender. breast feeding. Cardiovascular: Regular rate and Rhythm. No tachycardia   Abd: normal size, well involuted, firm, non-tender   Vag: N/A. Lochia: minimal   Ext: No calf tenderness; Edema: 2+   Incision: bandaged with aquacel

## 2019-03-29 NOTE — PROGRESS NOTES
[facility] Baby Unit  Obstetrics  Postpartum Progress Note    Patient Name: Deneen Borrego  MRN: [id number]  Admission Date: 3/26/2019  Hospital Length of Stay: 2 days  Attending Physician: Miko Albert MD  Primary Care Provider: Go Corrigan MD    Subjective:     Principal Problem:Severe preeclampsia, third trimester    Hospital course: Pt presented to L&D from clinic for BP monitoring and BP noted to be severe range.  She was treated with hydralazine and decision made to proceed with repeat  for severe gestational hypertension versus pre-eclampsia.    HD#2/POD#1 Routine post op care  HD#3/POD#2 Patient s/p MgSO4 for severe pre E. Currently asymptomatic. BPs increased to severe range in the pm and given a dose of IV hydralazine and started on po procardia.     Interval History:     She is doing well this morning. She is tolerating a regular diet without nausea or vomiting. She is voiding spontaneously. She is ambulating. She has passed flatus, and has not a BM. Vaginal bleeding is mild. She denies fever or chills. Abdominal pain is mild and controlled with oral medications. She is breastfeeding. She desires circumcision for her male baby: yes.    Objective:     Vital Signs (Most Recent):  Temp: 97 °F (36.1 °C) (19 1500)  Pulse: 86 (19 1815)  Resp: 16 (19 1500)  BP: (!) 146/76 (19 1815)  SpO2: 97 % (19 0721) Vital Signs (24h Range):  Temp:  [96.6 °F (35.9 °C)-98 °F (36.7 °C)] 97 °F (36.1 °C)  Pulse:  [72-89] 86  Resp:  [16-18] 16  SpO2:  [97 %] 97 %  BP: (133-151)/(73-87) 146/76     Weight: (!) 145.2 kg (320 lb)  Body mass index is 58.53 kg/m².      Intake/Output Summary (Last 24 hours) at 3/28/2019 2004  Last data filed at 3/28/2019 0500  Gross per 24 hour   Intake 1312.5 ml   Output 985 ml   Net 327.5 ml       Significant Labs:  Lab Results   Component Value Date    GROUPTR O POS 2019    HEPBSAG Negative 2018    STREPBCULT  2019      STREPTOCOCCUS AGALACTIAE (GROUP B)  Beta-hemolytic streptococci are routinely susceptible to   penicillins,cephalosporins and carbapenems.       Recent Labs   Lab 19  0625   HGB 9.7*   HCT 30.3*       I have personallly reviewed all pertinent lab results from the last 24 hours.    Physical Exam    Chest: Clear to auscultation   Breasts: Soft. Nontender. breast feeding. Cardiovascular: Regular rate and Rhythm. No tachycardia   Abd: normal size, well involuted, firm, non-tender   Vag: N/A. Lochia: minimal   Ext: No calf tenderness; Edema: 2+   Incision: bandaged with aquacel         Assessment/Plan:     33 y.o. female  for:    * Severe preeclampsia, third trimester  S/p MgSO4 for 24 hours  BPs mild range currently; will treat if needed     Status post repeat low transverse  section  Routine post op care    AMA (advanced maternal age) multigravida 35+, second trimester  Normal cffDNA    History of  delivery  Repeat  for delivery now.    Patient cleared for Anesthesia: Spinal    Anesthesia/Surgery risks, benefits, and alternative options discussed and understood by patient/fa      Uterine fibroid in pregnancy  Patient has known posterior uterine fibroid.    She ultimately desires hysterectomy after postpartum period.         Disposition: As patient meets milestones, will plan to discharge tomorrow.    Josiane Mills MD  Obstetrics  [place] - Mother Baby Unit

## 2019-03-29 NOTE — LACTATION NOTE
This note was copied from a baby's chart.  Infant rooting. Infant placed in mother's arms. No acute distress noted. Mother easily latched with minimal assist from nurse at present time. Reinforced Breastfeeding Guide and reviewed first alert form, importance/ benefits of exclusive breastfeeding for 6 months, proper handling and storage of breast milk, and all resources available after leaving the hospital. Questions/ Concerns answered. Mother verbalized understanding.

## 2019-03-29 NOTE — DISCHARGE SUMMARY
[facility] Mother Baby Unit  Obstetrics  Discharge Summary      Patient Name: Deneen Borrego  MRN: [id number]  Admission Date: 3/26/2019  Hospital Length of Stay: 3 days  Discharge Date and Time:  2019 9:29 AM  Attending Physician: Miko Albert MD   Discharging Provider: Patrick Garrido MD  Primary Care Provider: Go Corrigan MD    HPI: Pt is a  @ 37 6/7 WGA who presented to clinic for a BP check and was noted to have severe range blood pressures.  She was sent to L&D for evaluation and BPs were noted to be persistently severe.  She denies HA, vision changes, or RUQ pain.  She denies contractions, vaginal bleeding, LOF.  Normal FM.     Procedure(s) (LRB):  REPEAT  SECTION (N/A)     Hospital Course:   Pt presented to L&D from clinic for BP monitoring and BP noted to be severe range.  She was treated with hydralazine and decision made to proceed with repeat  for severe gestational hypertension versus pre-eclampsia.    HD#2/POD#1 Routine post op care  HD#3/POD#2 Patient s/p MgSO4 for severe pre E. Currently asymptomatic. BPs increased to severe range in the pm and given a dose of IV hydralazine and started on po procardia.   Postop day 3.  Patient doing well on Procardia.  Patient ready for discharge home.    Consults (From admission, onward)        Status Ordering Provider     Inpatient consult to Anesthesiology  Once     Provider:  Micky Perez, CRNA    Acknowledged MIKO ALBERT          Final Active Diagnoses:    Diagnosis Date Noted POA    PRINCIPAL PROBLEM:  Severe preeclampsia, third trimester [O14.13] 2019 Yes    Obesity affecting pregnancy in third trimester [O99.213] 2019 Yes    Status post repeat low transverse  section [Z98.891] 2019 Not Applicable    History of  delivery [Z98.891] 2018 Not Applicable    AMA (advanced maternal age) multigravida 35+, second trimester [O09.522] 2018 Yes    Uterine  fibroid in pregnancy [O34.10, D25.9] 2018 Yes      Problems Resolved During this Admission:            Feeding Method: breast    Immunizations     None          Delivery:    Episiotomy: None   Lacerations: None   Repair suture: None   Repair # of packets:     Blood loss (ml): 0     Birth information:  YOB: 2021   Time of birth: 7:00 PM   Sex: male   Delivery type: , Low Transverse   Gestational Age: 37w6d    Delivery Clinician:      Other providers:       Additional  information:  Forceps:    Vacuum:    Breech:    Observed anomalies      Living?:           APGARS  One minute Five minutes Ten minutes   Skin color:         Heart rate:         Grimace:         Muscle tone:         Breathing:         Totals: 8  10        Placenta: Delivered:       appearance    Pending Diagnostic Studies:     None          Discharged Condition: good    Disposition:  home    Follow Up:  1 week    Patient Instructions:   No discharge procedures on file.  Medications:  Current Discharge Medication List      CONTINUE these medications which have NOT CHANGED    Details   acetaminophen (TYLENOL) 500 MG tablet Take 500 mg by mouth every 6 (six) hours as needed for Pain.      cyclobenzaprine (FLEXERIL) 5 MG tablet Take 1 tablet (5 mg total) by mouth 3 (three) times daily as needed for Muscle spasms.  Qty: 20 tablet, Refills: 0      HYDROcodone-acetaminophen (NORCO) 5-325 mg per tablet Take 1 tablet by mouth every 6 (six) hours as needed for Pain.  Qty: 6 tablet, Refills: 0      levothyroxine (SYNTHROID) 50 MCG tablet Take 50 mcg by mouth once daily.      nystatin (MYCOSTATIN) cream Apply topically 2 (two) times daily.  Qty: 30 g, Refills: 0      omeprazole (PRILOSEC) 20 MG capsule Take 20 mg by mouth once daily.      prenatal 25/iron fum/folic/dha (PRENATAL-1 ORAL) Take by mouth.      pseudoephedrine HCl (SUDAFED ORAL) Take by mouth.             Patrick Garrido MD  Obstetrics  [place] - Mother Baby Unit

## 2019-03-29 NOTE — NURSING
Patient states had right neck through shoulder pain during pregnancy and now shoulder feels numb and can only raise right arm to level of shoulder when sitting but can raise arm above head when laying down. Neuro checks within normal limits and no deficits noted presently. Hand  equal. Patient denies headache, dizziness and lightheaded presently. Patient states gets headaches when breastfeeds but goes away. Denies needs presently.

## 2019-03-29 NOTE — PLAN OF CARE
03/29/19 1436   Final Note   Assessment Type Final Discharge Note   Anticipated Discharge Disposition Home   What phone number can be called within the next 1-3 days to see how you are doing after discharge? [phone]   Hospital Follow Up  Appt(s) scheduled? Yes   Discharge plans and expectations educations in teach back method with documentation complete? Yes

## 2019-03-29 NOTE — NURSING
Discharge instructions reviewed with patient, copy given to her,voices understanding, denies needs, concerns at present.

## 2019-03-29 NOTE — PLAN OF CARE
Problem: Adult Inpatient Plan of Care  Goal: Plan of Care Review  Outcome: Ongoing (interventions implemented as appropriate)  Stable shift. Pt tolerated all meds and procedures well. NAD noted. See flow sheets and notes for details. Pt up in room w/o assistance needed. Pt attentive to infant during shift. Pt BF infant w/ no assistance needed during shift. Pt denies pain during shift. Plan of care reviewed w/ pt; pt states understanding.   Used Breastfeeding Guide and reviewed first alert form, importance/ benefits of exclusive breastfeeding for 6 months, proper handling and storage of breast milk, and all resources available after leaving the hospital. Questions/ Concerns answered. Mother verbalized understanding.   Reinforced benefits of skin to skin at birth and throughout hospital stay.  Questions/ Concerns answered, Mother verbalizes understanding.

## 2019-04-01 ENCOUNTER — POSTPARTUM VISIT (OUTPATIENT)
Dept: OBSTETRICS AND GYNECOLOGY | Facility: CLINIC | Age: 34
End: 2019-04-01
Payer: COMMERCIAL

## 2019-04-01 VITALS
HEART RATE: 80 BPM | BODY MASS INDEX: 54.98 KG/M2 | SYSTOLIC BLOOD PRESSURE: 156 MMHG | DIASTOLIC BLOOD PRESSURE: 94 MMHG | WEIGHT: 293 LBS | TEMPERATURE: 99 F | RESPIRATION RATE: 16 BRPM

## 2019-04-01 DIAGNOSIS — Z98.891 STATUS POST CESAREAN SECTION ROUTINE POSTPARTUM FOLLOW-UP: ICD-10-CM

## 2019-04-01 PROCEDURE — 0503F PR POSTPARTUM CARE VISIT: ICD-10-PCS | Mod: S$GLB,,, | Performed by: NURSE PRACTITIONER

## 2019-04-01 PROCEDURE — 99999 PR PBB SHADOW E&M-EST. PATIENT-LVL II: ICD-10-PCS | Mod: PBBFAC,,, | Performed by: NURSE PRACTITIONER

## 2019-04-01 PROCEDURE — 99999 PR PBB SHADOW E&M-EST. PATIENT-LVL II: CPT | Mod: PBBFAC,,, | Performed by: NURSE PRACTITIONER

## 2019-04-01 PROCEDURE — 0503F POSTPARTUM CARE VISIT: CPT | Mod: S$GLB,,, | Performed by: NURSE PRACTITIONER

## 2019-04-01 RX ORDER — NIFEDIPINE 60 MG/1
60 TABLET, EXTENDED RELEASE ORAL DAILY
Qty: 30 TABLET | Refills: 5 | Status: SHIPPED | OUTPATIENT
Start: 2019-04-01 | End: 2019-04-05 | Stop reason: DRUGHIGH

## 2019-04-01 NOTE — PROGRESS NOTES
Subjective:     Deneen Borrego, 33 y.o. female who presents for a postpartum visit.  She is post RLTCS on 3/26/19.  She has no complaints today. She is tolerating a regular diet with no nausea or vomiting.  She is voiding is and having normal bowel movements.  She denies heavy vaginal bleeding, vaginal discharge, or fever. She denies increasing pain and is not requiring pain medication.  She denies symptoms of postpartum depression.  She is bonding well with the baby.  She is breastfeeding.  Baby is doing well. Has denies breast complaints.      The following portions of the patient's history were reviewed and updated as appropriate: allergies, current medications, past family history, past   OB History    Para Term  AB Living   3 3 3     3   SAB TAB Ectopic Multiple Live Births         0 3      # Outcome Date GA Lbr Tru/2nd Weight Sex Delivery Anes PTL Lv   3 Term 19 37w6d  2.92 kg (6 lb 7 oz) M CS-LTranv Spinal N JASIEL   2 Term 08 38w0d  4.309 kg (9 lb 8 oz) F CS-LTranv  N JASIEL   1 Term 00 41w0d  4.394 kg (9 lb 11 oz) F Vag-Spont  N JASIEL       ROS:  GENERAL: No fever, chills, fatigability or weight loss.  VULVAR: No pain, no lesions and no itching.  VAGINAL: No relaxation, no itching, no discharge, no abnormal bleeding and no lesions.  ABDOMEN: No abdominal pain. Denies nausea. Denies vomiting. No diarrhea. No constipation  BREAST: Denies pain. No lumps. No discharge.  URINARY: No incontinence, no nocturia, no frequency and no dysuria.  CARDIOVASCULAR: No chest pain. No shortness of breath. No leg cramps.  NEUROLOGICAL: No headaches. No vision changes.    Objective:     Last Menstrual Period 2018      Physical Exam:  General:  alert,normal appearing female   Abdomen:  soft, non-tender, nondistended Aquacel dressing removed.  Incision C/D/I, margins well approximated and healing well with NO erythema, induration, or drainage.    Pelvis:               Neurologic: Deferred    Alert and oriented X 3, normal strength and tone. Normal symmetric reflexes. Normal coordination and gait Grossly NORMAL           Assessment:     Problem List Items Addressed This Visit     None      Visit Diagnoses     Severe pre-eclampsia, postpartum    -  Primary    Relevant Medications    NIFEdipine (PROCARDIA-XL) 60 MG (OSM) 24 hr tablet    Status post  section routine postpartum follow-up            BP elevated 156/94 and 158/96 though pt remains asymptomatic and neurologically intact    Plan:     Discussed BP with , will increase Procardia to 60mg daily, pt to check and log BP daily at home and RTC for BP check 19. Explicit emergency precautions given, verbalized understanding    NO heavy lifting, pushing, pulling, or other exertion greater than 10 pounds - to prevent the formation of postoperative hernias.    Anticipatory guidelines discussed and emergency precautions given, verbalized understanding.     Follow up in: a few days or as needed.

## 2019-04-02 NOTE — NURSING
Mom here today for first clinic visit postop. B/P high. Asymptomatic. Will re-check after mom is sitting for a while. Pain level addressed and mom reports prescribed pain meds working to control pain. Mostly incisional discomfort but reports right arm with numb tingly fingers and hard for her to raise higher than shoulder. Feeding Plan: Breastmilk. Reports milk in and baby feeding well. Mom reports she is voiding well and has had a bowel movement since surgery. Swelling minimal. Denies feelings of PP depression. Anticipatory guidance for expected healing, as well as emotions discussed. Resource #s emphasized and next appointment scheduled.   Feeds baby in office and blood pressure check after sitting feeding is still elevated.

## 2019-04-05 ENCOUNTER — POSTPARTUM VISIT (OUTPATIENT)
Dept: OBSTETRICS AND GYNECOLOGY | Facility: CLINIC | Age: 34
End: 2019-04-05
Payer: COMMERCIAL

## 2019-04-05 VITALS — SYSTOLIC BLOOD PRESSURE: 136 MMHG | DIASTOLIC BLOOD PRESSURE: 80 MMHG

## 2019-04-05 DIAGNOSIS — Z01.30 BP CHECK: Primary | ICD-10-CM

## 2019-04-05 PROCEDURE — 99213 OFFICE O/P EST LOW 20 MIN: CPT | Mod: 24,S$GLB,, | Performed by: NURSE PRACTITIONER

## 2019-04-05 PROCEDURE — 99999 PR PBB SHADOW E&M-EST. PATIENT-LVL II: CPT | Mod: PBBFAC,,, | Performed by: NURSE PRACTITIONER

## 2019-04-05 PROCEDURE — 99999 PR PBB SHADOW E&M-EST. PATIENT-LVL II: ICD-10-PCS | Mod: PBBFAC,,, | Performed by: NURSE PRACTITIONER

## 2019-04-05 PROCEDURE — 99213 PR OFFICE/OUTPT VISIT, EST, LEVL III, 20-29 MIN: ICD-10-PCS | Mod: 24,S$GLB,, | Performed by: NURSE PRACTITIONER

## 2019-04-05 RX ORDER — NIFEDIPINE 30 MG/1
30 TABLET, FILM COATED, EXTENDED RELEASE ORAL DAILY
COMMUNITY
End: 2019-12-20 | Stop reason: ALTCHOICE

## 2019-04-05 NOTE — PATIENT INSTRUCTIONS
"Recommended Interventions and Plan of Care for Derek Brothers    Breastfeed baby  on cue until content at least 8 or more in 24 in 24hrs.   Cluster feeds every 1-2hrs are common.    Use the asymmetric latch as demonstrated during the consult.   Go to www.[employer] and [employer] "Attaching your baby at the breast"    Use breast compression during pauses in sucking as demonstrated during the consult.    Observe for signs of milk transfer to baby:   wide pauses in the sucks   swallows throughout  the feedings   milk on the babys lips when removed from the breast   wet nipple as it comes out of the babys mouth   heavy breasts before a feeding and softer breasts after the feeding    Try to latch the baby onto the breast until latch occurs or until 10-15 min. elapse.  If unable to latch the baby deeply onto the breasts, supplement the baby and pump the breasts until empty and store the milk for the next feeding.    Supplement the baby with 15ml by any of the discussed/demonstrated alternative methods 4 x per day.    Pump with your spectra pump for 15min after feeding. If he will not Latch he needs 2 oz at a feeding.     Count and record the number of feedings, urine diapers, and dirty diapers every 24hrs.    Clean all breastfeeding aids with warm soapy water after each use and sterilize each day.    Use breastfeeding aids given: Pump kit, SNS, Feeding tube & Syringes      "

## 2019-04-05 NOTE — LACTATION NOTE
Mom here today for B/P follow-up since changing her blood pressure medication. Mom prescribed 60mg on Monday. States took it one time but felt so bad that she went back to 30mg. Pressure today normal range and lower than pressures on Monday. Swelling resolved. Lactation Consult done related to baby's slow weight gain. (See copy from baby's chart below)      Mother- Baby Intake Form    Mother's name: Deneen Borrego     Type of Delivery: Repeat C/Section    Any Complications? High Blood Pressure, Hemmorhage     1. List any medications that you are currently taking: Labetalol 30mg      2. Do you have any health problems? Hypothyroidism, Fibromyalgia      3. Did you lose more than normal amount of blood at delivery? yes      4. Do you have  History of anxiety or depressions? No      Have you been clinically treated for this?    5. Have you hand any breast surgery? No    Birth Weight:  6#7        Discharge Weight:  5#14.9     List other weights and when they were taken:  Date: 19  Weight: 6#0.2   List any health problems your baby had: None   List any medications your baby is taking: None     IN THE PAST 24 HOURS:  How many times has your baby gone to breast, and fed?     8      Attempts? 8       Approximate length of feeding times:   60    Are you offering one breast or two per feeding?   2   How many times have you pumped in the last 24 hours?   none     After breastfeeding:           In place of breastfeeding:           What type of pump are you using?     How much of the pumped milk was fed to your baby in the 24hrs?    How much formula was fed to your baby in the last 24hrs? None       Type of formula:       How many wet diapers / 24 hours?  8      Number of bowel movements in 24 hours?  4     LACTATION CONSULT-WORKSHEET    Reason for visit: Eye drainage  Todays weight: 2782 grams/ 6#2.1oz    TIME OF END OF LAST BREASTFEEDINam in lobby here both sides     BREASTFEEDING:    WEIGHT BEFORE FEEDIN    WEIGHT AFTER L&R BREAST     2880      WEIGHT GAIN             +12     TOTAL BF INTAKE: +12      PUMPING:  TOTAL PUMPED VOLUME:  +12    TIME AT END OF PUMPINam     ESTIMATED MATERNAL MILK YIELD/HR/DAY:  TOTAL BREAST MILK (FEED & PUMP)              24  cc  DIVIDE BY #HRS SINCE LAST FEED  1  hrs                                  =24cc/hr                           X 24 HRS     = 576 cc/24hrs                           = 19.2 OZ/DAY       INFANT NEEDS  16.6 OZ/DAY    MATERNAL MILK PRODUCED  19.2Oz/day    LACTATION NOTE: Mom calls clinic related to green eye drainage today. Concerned with weekend. States sleepy feeding as well and needing to wake for feedings. Exam done and weight reveals slow weight gain so feeding plan instituted and sent home with parents. Baby fed in front lobby prior to visit but feeds here with minimal transfer. Prodcution exceeeds needs. SNS, FF, and paced bottle feeding discussed and supplies provided to patient. Mom did not bring her pump so used our [employer] pump to pump in office. Kit sent home with mother. Parents give correct teachback of plan. Will follow-up next week.       Consult time started:1055  Consult time ended: 1245

## 2019-04-05 NOTE — PROGRESS NOTES
33 y.o. female for postpartum BP check.  Patient has no complaints.  She has been monitoring her BP at home, reports -130 and DPB 70-80. She is on Nifedipine and was increased from 30mg to 60mg 4 days ago.  She took the 60mg dose x1 and felt bad then resumed the 30mg dose the next 2 days.  Today /82 on 30mg Nifedipine. She also reports her swelling has improved and headaches have resolved.  Her delivery records were reviewed.  She is breast feeding infant.    Exam  General - well appearing, no apparent distress  Abdomen - soft, non tender, non distended incision, incision healing, steri strips C/D/I  Extremeties - no edema    Assessment:  Encounter Diagnoses   Name Primary?    BP check Yes    Preeclampsia in postpartum period         Continue Nifedipine 30mg daily and continue to monitor BP  RTC in 1 week

## 2019-04-10 ENCOUNTER — TELEPHONE (OUTPATIENT)
Dept: OBSTETRICS AND GYNECOLOGY | Facility: CLINIC | Age: 34
End: 2019-04-10

## 2019-04-10 NOTE — LACTATION NOTE
Called to check on pumping and feeding status. Mom states baby pooping 4-6 times per day and she is pumping at least 2 oz at a session after feeds and giving back to baby. Will recheck mom and baby on Friday

## 2019-04-12 ENCOUNTER — POSTPARTUM VISIT (OUTPATIENT)
Dept: OBSTETRICS AND GYNECOLOGY | Facility: CLINIC | Age: 34
End: 2019-04-12
Payer: COMMERCIAL

## 2019-04-12 VITALS
DIASTOLIC BLOOD PRESSURE: 74 MMHG | WEIGHT: 282.19 LBS | BODY MASS INDEX: 51.93 KG/M2 | SYSTOLIC BLOOD PRESSURE: 128 MMHG | HEIGHT: 62 IN

## 2019-04-12 DIAGNOSIS — Z98.891 STATUS POST CESAREAN SECTION ROUTINE POSTPARTUM FOLLOW-UP: ICD-10-CM

## 2019-04-12 DIAGNOSIS — Z86.59 HX OF BIPOLAR DISORDER: ICD-10-CM

## 2019-04-12 PROCEDURE — 99999 PR PBB SHADOW E&M-EST. PATIENT-LVL III: ICD-10-PCS | Mod: PBBFAC,,, | Performed by: NURSE PRACTITIONER

## 2019-04-12 PROCEDURE — 0503F POSTPARTUM CARE VISIT: CPT | Mod: S$GLB,,, | Performed by: NURSE PRACTITIONER

## 2019-04-12 PROCEDURE — 0503F PR POSTPARTUM CARE VISIT: ICD-10-PCS | Mod: S$GLB,,, | Performed by: NURSE PRACTITIONER

## 2019-04-12 PROCEDURE — 99999 PR PBB SHADOW E&M-EST. PATIENT-LVL III: CPT | Mod: PBBFAC,,, | Performed by: NURSE PRACTITIONER

## 2019-04-12 RX ORDER — LEVOTHYROXINE SODIUM 50 UG/1
50 TABLET ORAL DAILY
COMMUNITY
End: 2019-05-08 | Stop reason: SDUPTHER

## 2019-04-12 RX ORDER — ESCITALOPRAM OXALATE 10 MG/1
10 TABLET ORAL DAILY
Qty: 30 TABLET | Refills: 2 | Status: SHIPPED | OUTPATIENT
Start: 2019-04-12 | End: 2020-04-15

## 2019-04-12 NOTE — PROGRESS NOTES
"SUBJECTIVE:  Deneen Borrego is a 33 y.o.  female who presents for a postpartum visit. She is 2 weeks postpartum following a repeat  section. Pregnancy complications: preeclampsia/eclampsia. The delivery was at 37/6 gestational weeks. Postpartum course has been complicated by severe pre-eclampsia. She is feeling well.  She stopped taking her BP medication (nifedipine). States she was feeling lightheaded and her BP was reading "low" at home. /74 in clinic today without medication.  Baby's course has been complicated by low birth weight. Baby is feeding by breast. Bleeding thin lochia. Bowel function is normal. Bladder function is normal. Patient is not sexually active. Contraception method is abstinence. She had plan to have a hysterectomy at time of delivery but was unable to complete due to postpartum hemorrhage. Postpartum depression screening: negative though pt reports history of bipolar depression and has felt more depressed lately. She has no suicidal/homicidal inclination.      I have fully reviewed the prenatal and intrapartum course.  The following portions of the patient's history were reviewed and updated as appropriate: allergies, current medications, past family history, past     OB History    Para Term  AB Living   3 3 3     3   SAB TAB Ectopic Multiple Live Births         0 3      # Outcome Date GA Lbr Tru/2nd Weight Sex Delivery Anes PTL Lv   3 Term 19 37w6d  2.92 kg (6 lb 7 oz) M CS-LTranv Spinal N JASIEL   2 Term 08 38w0d  4.309 kg (9 lb 8 oz) F CS-LTranv  N JASIEL   1 Term 00 41w0d  4.394 kg (9 lb 11 oz) F Vag-Spont  N JASIEL       ROS:  GENERAL: No fever, chills, fatigability or weight loss.  VULVAR: No pain, no lesions and no itching.  VAGINAL: No relaxation, no itching, no discharge, no abnormal bleeding and no lesions.  ABDOMEN: No abdominal pain. Denies nausea. Denies vomiting. No diarrhea. No constipation  BREAST: Denies pain. No lumps. No " "discharge.  URINARY: No incontinence, no nocturia, no frequency and no dysuria.  CARDIOVASCULAR: No chest pain. No shortness of breath. No leg cramps.  NEUROLOGICAL: No headaches. No vision changes.  PSYCH: depressed, DENIES SI/HI or plans to harm self or others    OBJECTIVE:   Blood Pressure 128/74 (BP Location: Right arm, Patient Position: Sitting)   Height 5' 2" (1.575 m)   Weight 128 kg (282 lb 3.2 oz)   Last Menstrual Period 2018 The patient is currently breastfeeding.Body mass index is 51.62 kg/m².  Date of last Pap smear: 2018    General Appearance: alert, well appearing, in no apparent distress, oriented to person, place and time  Head: normocephalic, atraumatic  Abdomen: benign non-tender, without masses or organomegaly palpable, incision well-healed, without erythema, without hematoma and without evidence of infection  Pelvic: Not examined  Extremities: no redness or tenderness in the calves or thighs, no edema  Skin: normal coloration and turgor, no rashes  Neurological: alert, oriented, normal speech, no focal findings or movement disorder noted  Psych: good grooming, good insight, normal perception, normal reasoning, normal speech pattern and content and normal thought patterns flattened affect         ASSESSMENT:  Problem List Items Addressed This Visit     None      Visit Diagnoses     Mild postpartum depression    -  Primary    Relevant Medications    escitalopram oxalate (LEXAPRO) 10 MG tablet    Hx of bipolar disorder        Relevant Medications    escitalopram oxalate (LEXAPRO) 10 MG tablet    Status post  section routine postpartum follow-up        Encounter for lactation counseling              PLAN:    PPD screen negative will start lexapro due to hx of bipolar and increasing depression, will start lexapro today    Will discuss future hysterectomy with  at next visit    Lactation support provided, see note    BP stable without medication, continue to monitor at " home, resume nifedipine if SBP >140 or DBP >90    See orders and Patient Instructions    Anticipatory guidelines discussed and emergency precautions given, verbalized understanding.     Follow up in: 4 weeks or as needed.

## 2019-04-12 NOTE — PROGRESS NOTES
Mom here today for 2 week clinic visit postop. VS stable. Pain level addressed and mom reports prescribed pain meds working to control pain. Feeding Plan: Breastmilk / Formula. Incision check completed. Incision healthy with edges approximated. Mom reports she is voiding well and having regular bowel movements since surgery. Denies feelings of PP depression. Depression Scale completed. Anticipatory guidance for continued expected healing, as well as emotions discussed. Plans for birth control discussed. Resource #s emphasized and next appointment scheduled.

## 2019-04-12 NOTE — PATIENT INSTRUCTIONS
"      Understanding Postpartum Depression  Youve just had a baby. You know you should be excited and happy. But instead you find yourself crying for no reason. You may have trouble coping with your daily tasks. You feel sad, tired, and hopeless most of the time. You may even feel ashamed or guilty. But what youre going through is not your fault and you can feel better. Talk to your healthcare provider. He or she can help.    What is depression?  Depression is a mood disorder that affects the way you think and feel. The most common symptom is a feeling of deep sadness. You may also feel as if you just cant cope with life. Other symptoms include:  · Gaining or losing a lot of weight  · Sleeping too much or too little  · Feeling tired all the time  · Feeling restless  · Crying a lot  · Having too little or too much appetite.  · Withdrawing from friends and family  · Having headaches, aches and pains, or stomach problems that won't go away.  · Fears of harming your baby  · Lack of interest in your baby  · Feeling worthless or guilty  · No longer finding pleasure in things you used to  · Having trouble thinking clearly or making decisions  · Thinking about death or suicide   Depression after childbirth  You may be weepy and tired right after giving birth. These feelings are normal. Theyre sometimes called the baby blues. These blues go away after 2 or 3 weeks. However, postpartum (meaning after birth) depression lasts much longer and is more severe than the "baby blues." It can make you feel sad and hopeless. You may also fear that your baby will be harmed and worry about being a bad mother.  What causes postpartum depression?  The exact cause of postpartum depression is unknown. Changes in brain chemistry or structure are believed to play a big role in depression. It may be due to changes in your hormones during and after childbirth. You may also be tired from caring for your baby and adjusting to being a " mother. All these factors may make you feel depressed. In some cases, your genes may also play a role.  Depression can be treated  The good news is that there are many ways to treat postpartum depression. Talking to your healthcare provider is the first step toward feeling better.  Resources  · National [place] of Mental Yaskqn681-436-0913gfq.[facility].nih.gov  · National [place] on Mental Ttdsccp222-929-6860ban.ken.org  · Mental Health Dwmuhsx109-651-6484scn.[facility].org  · National Suicide Dehsfjp311-950-7780 (800-SUICIDE)   Date Last Reviewed: 8/16/2015  © 2442-8740 [employer]. [address] [place] [address], [place] [address]. All rights reserved. This information is not intended as a substitute for professional medical care. Always follow your healthcare professional's instructions.

## 2019-04-12 NOTE — LACTATION NOTE
Mom, Grandma & 2 week old Derek here for 2 week well check. VS Stable. Weight gain of only 2.3 oz in 1 week noted. Mom has been feeding at breast plus 1 bottle of breastmilk per day of 2 oz. Baby re- measured. Reports feedings going well but short. Consult completed here. Baby fed additional 20ml in office by bottle. Feeding plan changed to attempt for 15 minutes only then pump and give as much as baby will take after each feeding. Weight check for next week-  @ 10am. Support and encouragement provided. Anticipatory guidance provided on sleep habits, feeding patterns, and stooling patterns. Emphasized need for follow-up. Confirmed mom and baby have next appts. Support group reminders completed. Resource #s emphasized.      Mother- Baby Intake Form  Mother's name: Deneen Borrego                                                Any Complications? None       1. List any medications that you are currently taking: Labetalol 30mg        2. Do you have any health problems? Synthroid  Hypothyroidism     3. Did you lose more than normal amount of blood at delivery? yes        4. Do you have  History of anxiety or depressions? Yes      Have you been clinically treated for this? Yes     5. Have you hand any breast surgery? no     List any health problems your baby had: Jaundice  List any medications your baby is taking:  None     IN THE PAST 24 HOURS:  How many times has your baby gone to breast, and fed?                18        Attempts?   18     Approximate length of feeding times:   5-7 mins    Are you offering one breast or two per feeding?  1    How many times have you pumped in the last 24 hours?  3                 After breastfeeding:   3                         In place of breastfeedin                   What type of pump are you using? Spectra               How much of the pumped milk was fed to your baby in the 24hrs? 2oz              How much formula was fed to your baby in the last 24hrs? NONE                 How many wet diapers / 24 hours?  6                Number of bowel movements in 24 hours? 4 yellow seedy     LACTATION CONSULT-WORKSHEET  Reason for visit: Follow-up weight check     Birth weight:6#7   Last weight: 6#2.1  Todays weight: 6#4.4     TIME OF END OF LAST BREASTFEEDIN      BREASTFEEDING:     WEIGHT BEFORE FEEDIN  WEIGHT AFTER  L BREAST   2888     WEIGHT GAIN                     +32                  TOTAL BF INTAKE:  +32      PUMPING:  TOTAL PUMPED VOLUME: 35      TIME AT END OF PUMPIN     ESTIMATED MATERNAL MILK YIELD/HR/DAY:  TOTAL BREAST MILK (FEED & PUMP)                               67  cc  DIVIDE BY #HRS SINCE LAST FEED                           4.5 hrs                                14.88ml/hr                                 X 24 hours                                          =357.12    cc/24hrs                                 =  11.9 OZ/DAY         INFANT NEEDS         17        OZ/DAY     MATERNAL MILK PRODUCED without counting feeding mom did in office = 12Oz/day      LACTATION VISIT START TIME:1115  LACTATION VISIT END TIME:1200

## 2019-04-17 ENCOUNTER — LACTATION CONSULT (OUTPATIENT)
Dept: OBSTETRICS AND GYNECOLOGY | Facility: CLINIC | Age: 34
End: 2019-04-17
Payer: COMMERCIAL

## 2019-04-17 PROCEDURE — 99211 PR OFFICE/OUTPT VISIT, EST, LEVL I: ICD-10-PCS | Mod: 24,S$GLB,, | Performed by: ADVANCED PRACTICE MIDWIFE

## 2019-04-17 PROCEDURE — 99211 OFF/OP EST MAY X REQ PHY/QHP: CPT | Mod: 24,S$GLB,, | Performed by: ADVANCED PRACTICE MIDWIFE

## 2019-04-17 NOTE — LACTATION NOTE
Mother- Baby Intake Follow-up Form    1. List any medications that you are currently taking: Synthroid      2. Do you have any health problems? Hypothyroid      3. Did you lose more than normal amount of blood at delivery? Yes      4. Do you have  History of anxiety or depressions? yes       Have you been clinically treated for this? Yes- has script for Lexapro- Felt she hasnt needed it yet.     5. Have you hand any breast surgery?   No    Baby's Name:   Derek       :  3/26/[birth year]  Age: 3 weeks   Pediatrician:   José Antonio EL    Gestational Age:  37 & 6         Birth Weight:  6#7 Discharge Weight:  5#14.9     List other weights and when they were taken:  Date:   19  Weight: 6#0.2oz   Date:  19  Weight: 6# 2.1  Date:  19 Weight:  6#4.4     List any health problems your baby had: eye drainage- blocked tear duct    List any medications your baby is taking: No    IN THE PAST 24 HOURS:  How many times has your baby gone to breast, and fed?     4      Attempts?   4     Approximate length of feeding times:  10-20     Are you offering one breast or two per feeding?  2    How many times have you pumped in the last 24 hours?  8      After breastfeedin        In place of breastfeedin         What type of pump are you using? Spectra     How much of the pumped milk was fed to your baby in the 24hrs? 8oz   How much formula was fed to your baby in the last 24hrs? NONE   How many wet diapers / 24 hours?  6      Number of bowel movements in 24 hours? 4 yellow seedy     LACTATION CONSULT-WORKSHEET    DATE: 19    Reason for visit: slow weight gain  Last weight: 6#4.4 or 2846 grams  Todays weight: 6#9.2 or 2984 grams    TIME OF END OF LAST BREASTFEEDIN     BREASTFEEDING: started @ 1026- finished @ 1053-0 so 25 with weights betweenmins    WEIGHT BEFORE FEEDIN   WEIGHT AFTER R BREAST    3074      WEIGHT GAIN         +28  WEIGHT AFTER L BREAST      3076     WEIGHT GAIN            +2      TOTAL BF  INTAKE:                       +30     PUMPING:  TOTAL PUMPED VOLUME:           TIME AT END OF PUMPING:      ESTIMATED MATERNAL MILK YIELD/HR/DAY:  TOTAL BREAST MILK (FEED & PUMP)                cc  DIVIDE BY #HRS SINCE LAST FEED               hrs                                      = cc/hr                   X 24 HRS                       = cc/24hrs                                         =  OZ/DAY       INFANT NEEDS 18   OZ/DAY    MATERNAL MILK PRODUCED  Oz/day     LACTATION NOTE: Mom and baby here today for weight check and feeding help. Gain of 4.8oz in 5 days. Baby now above birth weight. Mom has been feeding plus giving extra breastmilk by bottle. Did try feeding with finger feeding/sns but she switched to bottle on own. Originally having problems with increased BP but resolved. Hypothyroid on Synthroid with labs to be re-drawn at 6 week visit. First time to not feed prior to visit today. Baby transfers 28ml first side but mom still needing help to get deep latch to breast and not just nipple. Only transfers 2 ml second side. Mom pumps with her own DCS Spectra for 15 minutes with 40ml obtained. Moms production @ oz. Baby needs 18. Pumped milk fed back to baby.         Consult time started: 1020  Consult time ended: 1115

## 2019-04-17 NOTE — PATIENT INSTRUCTIONS
Continue to feed 8 x minimum per 24 hours  Pump after as many feedings as you can or in place of feeding if pump and bottle feeding  You need 8 pumps   He needs 18oz per 24 hours  He transferred 1 oz on you- assume that's what he's getting and feed him until he is content  Remember to do breast compression when at breast  Swallowing = eating not just suckling  When giving a bottle he needs 21/2-3 oz per session if 8 feedings- he needs more if he eats less often

## 2019-05-07 ENCOUNTER — LAB VISIT (OUTPATIENT)
Dept: LAB | Facility: HOSPITAL | Age: 34
End: 2019-05-07
Attending: OBSTETRICS & GYNECOLOGY
Payer: COMMERCIAL

## 2019-05-07 ENCOUNTER — POSTPARTUM VISIT (OUTPATIENT)
Dept: OBSTETRICS AND GYNECOLOGY | Facility: CLINIC | Age: 34
End: 2019-05-07
Payer: COMMERCIAL

## 2019-05-07 VITALS
BODY MASS INDEX: 51.89 KG/M2 | WEIGHT: 282 LBS | SYSTOLIC BLOOD PRESSURE: 138 MMHG | HEART RATE: 62 BPM | DIASTOLIC BLOOD PRESSURE: 68 MMHG | HEIGHT: 62 IN

## 2019-05-07 DIAGNOSIS — E03.9 HYPOTHYROIDISM, UNSPECIFIED TYPE: ICD-10-CM

## 2019-05-07 DIAGNOSIS — Z98.891 S/P CESAREAN SECTION: Primary | ICD-10-CM

## 2019-05-07 DIAGNOSIS — Z30.011 ENCOUNTER FOR INITIAL PRESCRIPTION OF CONTRACEPTIVE PILLS: ICD-10-CM

## 2019-05-07 DIAGNOSIS — Z13.31 DEPRESSION SCREENING NEGATIVE: ICD-10-CM

## 2019-05-07 DIAGNOSIS — Z01.30 BLOOD PRESSURE CHECK: ICD-10-CM

## 2019-05-07 DIAGNOSIS — Z51.89 VISIT FOR WOUND CHECK: ICD-10-CM

## 2019-05-07 LAB — TSH SERPL DL<=0.005 MIU/L-ACNC: 3.89 UIU/ML (ref 0.4–4)

## 2019-05-07 PROCEDURE — 84443 ASSAY THYROID STIM HORMONE: CPT

## 2019-05-07 PROCEDURE — 99999 PR PBB SHADOW E&M-EST. PATIENT-LVL III: CPT | Mod: PBBFAC,,, | Performed by: OBSTETRICS & GYNECOLOGY

## 2019-05-07 PROCEDURE — 99024 POSTOP FOLLOW-UP VISIT: CPT | Mod: S$GLB,,, | Performed by: OBSTETRICS & GYNECOLOGY

## 2019-05-07 PROCEDURE — 99024 PR POST-OP FOLLOW-UP VISIT: ICD-10-PCS | Mod: S$GLB,,, | Performed by: OBSTETRICS & GYNECOLOGY

## 2019-05-07 PROCEDURE — 36415 COLL VENOUS BLD VENIPUNCTURE: CPT

## 2019-05-07 PROCEDURE — 99999 PR PBB SHADOW E&M-EST. PATIENT-LVL III: ICD-10-PCS | Mod: PBBFAC,,, | Performed by: OBSTETRICS & GYNECOLOGY

## 2019-05-07 RX ORDER — ACETAMINOPHEN AND CODEINE PHOSPHATE 120; 12 MG/5ML; MG/5ML
1 SOLUTION ORAL DAILY
Qty: 28 TABLET | Refills: 11 | Status: SHIPPED | OUTPATIENT
Start: 2019-05-07 | End: 2019-12-20

## 2019-05-07 NOTE — PROGRESS NOTES
"Chief Complaint   Patient presents with    Postpartum Care     6 week followup       Deneen Borrego is a 33 y.o. female  post-op from a RLTCS on 3/26/19.  Patient is Doing well postoperatively..      Patient reports pain well controlled. Bowel and bladder function normal. Good appetite. No fever or chills. No abnormal vaginal bleeding or discharge. Patient has a history of AUB-L and ultimately desires a TLH however would like to be placed on Micronor for the interim.    The pathology revealed:  n/a    Past Medical History:   Diagnosis Date    Advanced maternal age in multigravida, second trimester      Past Surgical History:   Procedure Laterality Date     SECTION      x1    CHOLECYSTECTOMY      REPEAT  SECTION N/A 3/26/2019    [facility]    THYROIDECTOMY, PARTIAL       Family History   Problem Relation Age of Onset    Arrhythmia Maternal Grandfather     Breast cancer Neg Hx     Colon cancer Neg Hx     Ovarian cancer Neg Hx     Cardiomyopathy Neg Hx     Congenital heart disease Neg Hx     Early death Neg Hx     Heart attacks under age 50 Neg Hx     Pacemaker/defibrilator Neg Hx      Social History     Tobacco Use    Smoking status: Never Smoker    Smokeless tobacco: Never Used   Substance Use Topics    Alcohol use: No     Frequency: Never    Drug use: No     OB History    Para Term  AB Living   3 3 3     3   SAB TAB Ectopic Multiple Live Births         0 3      # Outcome Date GA Lbr Tru/2nd Weight Sex Delivery Anes PTL Lv   3 Term 19 37w6d  2.92 kg (6 lb 7 oz) M CS-LTranv Spinal N JASIEL   2 Term 08 38w0d  4.309 kg (9 lb 8 oz) F CS-LTranv  N JASIEL   1 Term 00 41w0d  4.394 kg (9 lb 11 oz) F Vag-Spont  N JASIEL       /68   Pulse 62   Ht 5' 2" (1.575 m)   Wt 127.9 kg (282 lb)   LMP 2018   Breastfeeding? Yes   BMI 51.58 kg/m²     ROS:  GENERAL: No fever, chills, fatigability or weight loss.  VULVAR: " No pain, no lesions and no itching.  VAGINAL: No relaxation, no itching, no discharge, no abnormal bleeding and no lesions.  ABDOMEN: No abdominal pain. Denies nausea. Denies vomiting. No diarrhea. No constipation  BREAST: Denies pain. No lumps. No discharge.  URINARY: No incontinence, no nocturia, no frequency and no dysuria.  CARDIOVASCULAR: No chest pain. No shortness of breath. No leg cramps.  NEUROLOGICAL: No headaches. No vision changes.    PE:   General appearance: alert, appears stated age and cooperative  Lungs: clear to auscultation bilaterally  Breasts: normal appearance, no masses or tenderness  Heart: regular rate and rhythm, S1, S2 normal, no murmur, click, rub or gallop  Abdomen: soft, non-tender; bowel sounds normal; no masses,  no organomegaly  Pelvic: cervix normal in appearance, external genitalia normal, no adnexal masses or tenderness, no cervical motion tenderness, uterus normal size, shape, and consistency and vagina normal without discharge  Extremities: extremities normal, atraumatic, no cyanosis or edema  Incision: well healed       ASSESSMENT:    1. S/P  section     2. Blood pressure check     3. Depression screening negative     4. Visit for wound check          PLAN:  1. Rx of Micronor  2. TSH level to adjust medication as needed  3. RTC prn

## 2019-05-08 RX ORDER — LEVOTHYROXINE SODIUM 50 UG/1
50 TABLET ORAL DAILY
Qty: 30 TABLET | Refills: 3 | Status: SHIPPED | OUTPATIENT
Start: 2019-05-08 | End: 2020-04-15

## 2019-05-08 NOTE — TELEPHONE ENCOUNTER
Deneen desire refill of Synthroid 50mcg.   Patient Active Problem List   Diagnosis    Uterine fibroid in pregnancy    Encounter for supervision of normal pregnancy in third trimester    History of  delivery    AMA (advanced maternal age) multigravida 35+, second trimester    Sciatic nerve pain    Severe preeclampsia, third trimester    Obesity affecting pregnancy in third trimester    Status post repeat low transverse  section     Prior to Admission medications    Medication Sig Start Date End Date Taking? Authorizing Provider   escitalopram oxalate (LEXAPRO) 10 MG tablet Take 1 tablet (10 mg total) by mouth once daily. 19  Vanesa Osorio NP   ibuprofen (ADVIL,MOTRIN) 800 MG tablet Take 1 tablet (800 mg total) by mouth every 8 (eight) hours. 3/29/19   Patrick Garrido MD   levothyroxine (SYNTHROID) 50 MCG tablet Take 50 mcg by mouth once daily.    Historical Provider, MD   NIFEdipine (ADALAT CC) 30 MG TbSR Take 30 mg by mouth once daily.    Historical Provider, MD   norethindrone (ORTHO MICRONOR) 0.35 mg tablet Take 1 tablet (0.35 mg total) by mouth once daily. 19  Josiane Mills MD   oxyCODONE-acetaminophen (PERCOCET) 5-325 mg per tablet Take 1 tablet by mouth every 4 (four) hours as needed. 3/29/19   Patrick Garrido MD

## 2019-09-12 ENCOUNTER — OFFICE VISIT (OUTPATIENT)
Dept: OBSTETRICS AND GYNECOLOGY | Facility: CLINIC | Age: 34
End: 2019-09-12
Payer: COMMERCIAL

## 2019-09-12 VITALS
HEART RATE: 88 BPM | HEIGHT: 62 IN | DIASTOLIC BLOOD PRESSURE: 78 MMHG | RESPIRATION RATE: 10 BRPM | BODY MASS INDEX: 50.97 KG/M2 | SYSTOLIC BLOOD PRESSURE: 110 MMHG | WEIGHT: 277 LBS

## 2019-09-12 DIAGNOSIS — D25.9 UTERINE LEIOMYOMA, UNSPECIFIED LOCATION: ICD-10-CM

## 2019-09-12 DIAGNOSIS — N94.6 DYSMENORRHEA: ICD-10-CM

## 2019-09-12 DIAGNOSIS — N93.9 ABNORMAL UTERINE BLEEDING (AUB): Primary | ICD-10-CM

## 2019-09-12 PROCEDURE — 99999 PR PBB SHADOW E&M-EST. PATIENT-LVL III: CPT | Mod: PBBFAC,,, | Performed by: OBSTETRICS & GYNECOLOGY

## 2019-09-12 PROCEDURE — 99999 PR PBB SHADOW E&M-EST. PATIENT-LVL III: ICD-10-PCS | Mod: PBBFAC,,, | Performed by: OBSTETRICS & GYNECOLOGY

## 2019-09-12 PROCEDURE — 99213 PR OFFICE/OUTPT VISIT, EST, LEVL III, 20-29 MIN: ICD-10-PCS | Mod: S$GLB,,, | Performed by: OBSTETRICS & GYNECOLOGY

## 2019-09-12 PROCEDURE — 99213 OFFICE O/P EST LOW 20 MIN: CPT | Mod: S$GLB,,, | Performed by: OBSTETRICS & GYNECOLOGY

## 2019-09-12 RX ORDER — CERTOLIZUMAB PEGOL 200 MG/ML
400 INJECTION, SOLUTION SUBCUTANEOUS
COMMUNITY
End: 2020-04-15

## 2019-09-12 NOTE — PROGRESS NOTES
Subjective:    Patient ID: Deneen Borrego is a 33 y.o. y.o. female.     Chief Complaint:   Chief Complaint   Patient presents with    Consult       History of Present Illness:  Deneen presents today to discuss surgical management of her uterine fibroids. She has a long history of dysmenorrhea, irregular heavy bleeding and pelvic pain. She desires definitive surgical management. Discussed R/B/A.         ROS:   Review of Systems   Constitutional: Negative for activity change, appetite change, chills, diaphoresis, fatigue, fever and unexpected weight change.   HENT: Negative for mouth sores and tinnitus.    Eyes: Negative for discharge and visual disturbance.   Respiratory: Negative for cough, shortness of breath and wheezing.    Cardiovascular: Negative for chest pain, palpitations and leg swelling.   Gastrointestinal: Negative for abdominal pain, bloating, blood in stool, constipation, diarrhea, nausea and vomiting.   Endocrine: Negative for diabetes, hair loss, hot flashes, hyperthyroidism and hypothyroidism.   Genitourinary: Positive for dysmenorrhea, menorrhagia, menstrual problem and pelvic pain. Negative for dysuria, flank pain, frequency, genital sores, hematuria, urgency, vaginal bleeding, vaginal discharge, vaginal pain, postcoital bleeding and vaginal odor.   Musculoskeletal: Negative for back pain, joint swelling and myalgias.   Integumentary:  Negative for rash, acne, breast mass, nipple discharge and breast skin changes.   Neurological: Negative for seizures, syncope, numbness and headaches.   Hematological: Negative for adenopathy. Does not bruise/bleed easily.   Psychiatric/Behavioral: Negative for depression and sleep disturbance. The patient is not nervous/anxious.    Breast: Negative for mass, mastodynia, nipple discharge and skin changes          Objective:    Vital Signs:  Vitals:    09/12/19 1627   BP: 110/78   Pulse: 88   Resp: 10       Physical Exam:  General:  alert, cooperative, no  distress   Head Normocephalic, without obvious abnormality, atraumatic   Neck .supple, symmetrical, trachea midline   Skin:  Skin color, texture, turgor normal. No rashes or lesions   Abdomen:  soft, non-tender; bowel sounds normal   Pelvis: deferred   Extremities extremities normal, atraumatic, no cyanosis or edema   Neurologic Grossly normal        Assessment:      1. Abnormal uterine bleeding (AUB)    2. Uterine leiomyoma, unspecified location    3. Dysmenorrhea          Plan:      PLAN:   1. TVUS  2. Case request

## 2019-09-13 ENCOUNTER — TELEPHONE (OUTPATIENT)
Dept: OBSTETRICS AND GYNECOLOGY | Facility: CLINIC | Age: 34
End: 2019-09-13

## 2019-09-13 NOTE — TELEPHONE ENCOUNTER
LORI BS scheduled per pt's request on 12/27/19 with preop and preadmit appt's scheduled.  Case request number [id number].  Mayra in surgery notified of date and time via phone.

## 2019-12-03 ENCOUNTER — PROCEDURE VISIT (OUTPATIENT)
Dept: OBSTETRICS AND GYNECOLOGY | Facility: CLINIC | Age: 34
End: 2019-12-03
Payer: COMMERCIAL

## 2019-12-03 DIAGNOSIS — N93.9 ABNORMAL UTERINE BLEEDING (AUB): ICD-10-CM

## 2019-12-03 DIAGNOSIS — N94.6 DYSMENORRHEA: ICD-10-CM

## 2019-12-03 DIAGNOSIS — D25.9 UTERINE LEIOMYOMA, UNSPECIFIED LOCATION: ICD-10-CM

## 2019-12-03 PROCEDURE — 76830 PR  ECHOGRAPHY,TRANSVAGINAL: ICD-10-PCS | Mod: S$GLB,,, | Performed by: OBSTETRICS & GYNECOLOGY

## 2019-12-03 PROCEDURE — 76830 TRANSVAGINAL US NON-OB: CPT | Mod: S$GLB,,, | Performed by: OBSTETRICS & GYNECOLOGY

## 2019-12-20 ENCOUNTER — ANESTHESIA EVENT (OUTPATIENT)
Dept: SURGERY | Facility: HOSPITAL | Age: 34
End: 2019-12-20
Payer: COMMERCIAL

## 2019-12-20 ENCOUNTER — HOSPITAL ENCOUNTER (OUTPATIENT)
Dept: PREADMISSION TESTING | Facility: HOSPITAL | Age: 34
Discharge: HOME | End: 2019-12-20
Attending: OBSTETRICS & GYNECOLOGY
Payer: COMMERCIAL

## 2019-12-20 ENCOUNTER — OFFICE VISIT (OUTPATIENT)
Dept: OBSTETRICS AND GYNECOLOGY | Facility: CLINIC | Age: 34
End: 2019-12-20
Payer: COMMERCIAL

## 2019-12-20 VITALS
BODY MASS INDEX: 47.48 KG/M2 | SYSTOLIC BLOOD PRESSURE: 120 MMHG | HEART RATE: 88 BPM | HEIGHT: 62 IN | WEIGHT: 258 LBS | RESPIRATION RATE: 10 BRPM | DIASTOLIC BLOOD PRESSURE: 80 MMHG

## 2019-12-20 DIAGNOSIS — Z01.818 PRE-OP EVALUATION: ICD-10-CM

## 2019-12-20 DIAGNOSIS — N93.9 ABNORMAL UTERINE BLEEDING (AUB): Primary | ICD-10-CM

## 2019-12-20 DIAGNOSIS — D25.9 UTERINE LEIOMYOMA, UNSPECIFIED LOCATION: ICD-10-CM

## 2019-12-20 DIAGNOSIS — E03.9 HYPOTHYROIDISM, UNSPECIFIED TYPE: Primary | ICD-10-CM

## 2019-12-20 DIAGNOSIS — N94.6 DYSMENORRHEA: ICD-10-CM

## 2019-12-20 LAB
BACTERIAL VAGINOSIS DNA: NEGATIVE
CANDIDA GLABRATA DNA: NEGATIVE
CANDIDA KRUSEI DNA: NEGATIVE
CANDIDA RRNA VAG QL PROBE: NEGATIVE
T VAGINALIS RRNA GENITAL QL PROBE: NEGATIVE

## 2019-12-20 PROCEDURE — 99999 PR PBB SHADOW E&M-EST. PATIENT-LVL III: CPT | Mod: PBBFAC,,, | Performed by: OBSTETRICS & GYNECOLOGY

## 2019-12-20 PROCEDURE — 99499 NO LOS: ICD-10-PCS | Mod: S$GLB,,, | Performed by: OBSTETRICS & GYNECOLOGY

## 2019-12-20 PROCEDURE — 99499 UNLISTED E&M SERVICE: CPT | Mod: S$GLB,,, | Performed by: OBSTETRICS & GYNECOLOGY

## 2019-12-20 PROCEDURE — 87481 CANDIDA DNA AMP PROBE: CPT | Mod: 59

## 2019-12-20 PROCEDURE — 99999 PR PBB SHADOW E&M-EST. PATIENT-LVL III: ICD-10-PCS | Mod: PBBFAC,,, | Performed by: OBSTETRICS & GYNECOLOGY

## 2019-12-20 PROCEDURE — 87801 DETECT AGNT MULT DNA AMPLI: CPT

## 2019-12-20 RX ORDER — TRIAMCINOLONE ACETONIDE 1 MG/G
CREAM TOPICAL 2 TIMES DAILY
Qty: 45 G | Refills: 2 | Status: SHIPPED | OUTPATIENT
Start: 2019-12-20 | End: 2020-04-15

## 2019-12-20 RX ORDER — SODIUM CHLORIDE, SODIUM LACTATE, POTASSIUM CHLORIDE, CALCIUM CHLORIDE 600; 310; 30; 20 MG/100ML; MG/100ML; MG/100ML; MG/100ML
INJECTION, SOLUTION INTRAVENOUS CONTINUOUS
Status: CANCELLED | OUTPATIENT
Start: 2019-12-20

## 2019-12-20 NOTE — PROGRESS NOTES
Deneen Borrego is a 34 y.o. female  for preop examination.  She is scheduled for a TLH/BS on 19.    ROS:  GENERAL: Denies weight gain or weight loss. Feeling well overall.   SKIN: Denies rash or lesions.   HEAD: Denies head injury or headache.   NODES: Denies enlarged lymph nodes.   CHEST: Denies chest pain or shortness of breath.   CARDIOVASCULAR: Denies palpitations or left sided chest pain.   ABDOMEN: No abdominal pain, constipation, diarrhea, nausea, vomiting or rectal bleeding.   URINARY: No frequency, dysuria, hematuria, or burning on urination.  REPRODUCTIVE: See HPI.   BREASTS: The patient performs breast self-examination and denies pain, lumps, or nipple discharge.   HEMATOLOGIC: No easy bruisability or excessive bleeding with the exception of menstrual cycles.  MUSCULOSKELETAL: Denies joint pain or swelling.   NEUROLOGIC: Denies syncope or weakness.   PSYCHIATRIC: Denies depression, anxiety or mood swings.    Past Medical History:   Diagnosis Date    Advanced maternal age in multigravida, second trimester     Hypothyroid      Past Surgical History:   Procedure Laterality Date     SECTION      x1     SECTION N/A 3/26/2019    Procedure: REPEAT  SECTION;  Surgeon: Miko Alebrt MD;  Location: [facility] OR;  Service: OB/GYN;  Laterality: N/A;    CHOLECYSTECTOMY      THYROIDECTOMY, PARTIAL       Family History   Problem Relation Age of Onset    Arrhythmia Maternal Grandfather     Breast cancer Neg Hx     Colon cancer Neg Hx     Ovarian cancer Neg Hx     Cardiomyopathy Neg Hx     Congenital heart disease Neg Hx     Early death Neg Hx     Heart attacks under age 50 Neg Hx     Pacemaker/defibrilator Neg Hx      Review of patient's allergies indicates:  No Known Allergies    Current Outpatient Medications:     certolizumab pegol (CIMZIA) 400 mg/2 mL (200 mg/mL x 2) SyKt, Inject 400 mg into the skin., Disp: , Rfl:     escitalopram oxalate (LEXAPRO) 10 MG  tablet, Take 1 tablet (10 mg total) by mouth once daily., Disp: 30 tablet, Rfl: 2    norethindrone (ORTHO MICRONOR) 0.35 mg tablet, Take 1 tablet (0.35 mg total) by mouth once daily., Disp: 28 tablet, Rfl: 11    ibuprofen (ADVIL,MOTRIN) 800 MG tablet, Take 1 tablet (800 mg total) by mouth every 8 (eight) hours. (Patient not taking: Reported on 12/20/2019), Disp: 20 tablet, Rfl: 2    levothyroxine (SYNTHROID) 50 MCG tablet, Take 1 tablet (50 mcg total) by mouth once daily. (Patient not taking: Reported on 12/20/2019), Disp: 30 tablet, Rfl: 3    NIFEdipine (ADALAT CC) 30 MG TbSR, Take 30 mg by mouth once daily., Disp: , Rfl:     oxyCODONE-acetaminophen (PERCOCET) 5-325 mg per tablet, Take 1 tablet by mouth every 4 (four) hours as needed. (Patient not taking: Reported on 12/20/2019), Disp: 20 tablet, Rfl: 0    triamcinolone acetonide 0.1% (KENALOG) 0.1 % cream, Apply topically 2 (two) times daily., Disp: 45 g, Rfl: 2  Outpatient Medications Marked as Taking for the 12/20/19 encounter (Office Visit) with Josiane Mills MD   Medication Sig Dispense Refill    certolizumab pegol (CIMZIA) 400 mg/2 mL (200 mg/mL x 2) SyKt Inject 400 mg into the skin.      escitalopram oxalate (LEXAPRO) 10 MG tablet Take 1 tablet (10 mg total) by mouth once daily. 30 tablet 2    norethindrone (ORTHO MICRONOR) 0.35 mg tablet Take 1 tablet (0.35 mg total) by mouth once daily. 28 tablet 11     Social History     Tobacco Use    Smoking status: Never Smoker    Smokeless tobacco: Never Used   Substance Use Topics    Alcohol use: No     Frequency: Never    Drug use: No         Last pap NEM  Last ultrasound Uterus with 3.5 cm fundal fibroid  Uterus retroverted and fibroid appears in posterior cul de sac  Normal adnexa    Vitals:    12/20/19 1016   BP: 120/80   Pulse: 88   Resp: 10     General Appearance: Alert, appropriate appearance for age. No acute distress, Chest/Respiratory Exam: Normal chest wall and respirations. Clear to  auscultation.   Cardiovascular Exam: regular rate and rhythm, S1, S2 normal, no murmur, click, rub or gallop   Gastrointestinal Exam: soft, non-tender; bowel sounds normal; no masses,  no organomegaly  Pelvic Exam Female: Exam deferred.   Psychiatric Exam: Alert and oriented, appropriate affect.    Assessment:   AUB  Dysmenorrhea  Uterine fibroids    Plan:   TLH/BS  Consents  Pre-op appt  Pre-op labs    I have discussed the risks, benefits, indications, and alternatives of the procedure in detail.  The patient verbalizes her understanding.  All questions answered.  Consents signed.  The patient agrees to proceed to proceed as planned.

## 2019-12-20 NOTE — H&P (VIEW-ONLY)
Deneen Borrego is a 34 y.o. female  for preop examination.  She is scheduled for a TLH/BS on 19.    ROS:  GENERAL: Denies weight gain or weight loss. Feeling well overall.   SKIN: Denies rash or lesions.   HEAD: Denies head injury or headache.   NODES: Denies enlarged lymph nodes.   CHEST: Denies chest pain or shortness of breath.   CARDIOVASCULAR: Denies palpitations or left sided chest pain.   ABDOMEN: No abdominal pain, constipation, diarrhea, nausea, vomiting or rectal bleeding.   URINARY: No frequency, dysuria, hematuria, or burning on urination.  REPRODUCTIVE: See HPI.   BREASTS: The patient performs breast self-examination and denies pain, lumps, or nipple discharge.   HEMATOLOGIC: No easy bruisability or excessive bleeding with the exception of menstrual cycles.  MUSCULOSKELETAL: Denies joint pain or swelling.   NEUROLOGIC: Denies syncope or weakness.   PSYCHIATRIC: Denies depression, anxiety or mood swings.    Past Medical History:   Diagnosis Date    Advanced maternal age in multigravida, second trimester     Hypothyroid      Past Surgical History:   Procedure Laterality Date     SECTION      x1     SECTION N/A 3/26/2019    Procedure: REPEAT  SECTION;  Surgeon: Miko Albert MD;  Location: [facility];  Service: OB/GYN;  Laterality: N/A;    CHOLECYSTECTOMY      THYROIDECTOMY, PARTIAL       Family History   Problem Relation Age of Onset    Arrhythmia Maternal Grandfather     Breast cancer Neg Hx     Colon cancer Neg Hx     Ovarian cancer Neg Hx     Cardiomyopathy Neg Hx     Congenital heart disease Neg Hx     Early death Neg Hx     Heart attacks under age 50 Neg Hx     Pacemaker/defibrilator Neg Hx      Review of patient's allergies indicates:  No Known Allergies    Current Outpatient Medications:     certolizumab pegol (CIMZIA) 400 mg/2 mL (200 mg/mL x 2) SyKt, Inject 400 mg into the skin., Disp: , Rfl:     escitalopram oxalate (LEXAPRO) 10 MG  tablet, Take 1 tablet (10 mg total) by mouth once daily., Disp: 30 tablet, Rfl: 2    norethindrone (ORTHO MICRONOR) 0.35 mg tablet, Take 1 tablet (0.35 mg total) by mouth once daily., Disp: 28 tablet, Rfl: 11    ibuprofen (ADVIL,MOTRIN) 800 MG tablet, Take 1 tablet (800 mg total) by mouth every 8 (eight) hours. (Patient not taking: Reported on 12/20/2019), Disp: 20 tablet, Rfl: 2    levothyroxine (SYNTHROID) 50 MCG tablet, Take 1 tablet (50 mcg total) by mouth once daily. (Patient not taking: Reported on 12/20/2019), Disp: 30 tablet, Rfl: 3    NIFEdipine (ADALAT CC) 30 MG TbSR, Take 30 mg by mouth once daily., Disp: , Rfl:     oxyCODONE-acetaminophen (PERCOCET) 5-325 mg per tablet, Take 1 tablet by mouth every 4 (four) hours as needed. (Patient not taking: Reported on 12/20/2019), Disp: 20 tablet, Rfl: 0    triamcinolone acetonide 0.1% (KENALOG) 0.1 % cream, Apply topically 2 (two) times daily., Disp: 45 g, Rfl: 2  Outpatient Medications Marked as Taking for the 12/20/19 encounter (Office Visit) with Josiane Mills MD   Medication Sig Dispense Refill    certolizumab pegol (CIMZIA) 400 mg/2 mL (200 mg/mL x 2) SyKt Inject 400 mg into the skin.      escitalopram oxalate (LEXAPRO) 10 MG tablet Take 1 tablet (10 mg total) by mouth once daily. 30 tablet 2    norethindrone (ORTHO MICRONOR) 0.35 mg tablet Take 1 tablet (0.35 mg total) by mouth once daily. 28 tablet 11     Social History     Tobacco Use    Smoking status: Never Smoker    Smokeless tobacco: Never Used   Substance Use Topics    Alcohol use: No     Frequency: Never    Drug use: No         Last pap NEM  Last ultrasound Uterus with 3.5 cm fundal fibroid  Uterus retroverted and fibroid appears in posterior cul de sac  Normal adnexa    Vitals:    12/20/19 1016   BP: 120/80   Pulse: 88   Resp: 10     General Appearance: Alert, appropriate appearance for age. No acute distress, Chest/Respiratory Exam: Normal chest wall and respirations. Clear to  auscultation.   Cardiovascular Exam: regular rate and rhythm, S1, S2 normal, no murmur, click, rub or gallop   Gastrointestinal Exam: soft, non-tender; bowel sounds normal; no masses,  no organomegaly  Pelvic Exam Female: Exam deferred.   Psychiatric Exam: Alert and oriented, appropriate affect.    Assessment:   AUB  Dysmenorrhea  Uterine fibroids    Plan:   TLH/BS  Consents  Pre-op appt  Pre-op labs    I have discussed the risks, benefits, indications, and alternatives of the procedure in detail.  The patient verbalizes her understanding.  All questions answered.  Consents signed.  The patient agrees to proceed to proceed as planned.

## 2019-12-20 NOTE — DISCHARGE INSTRUCTIONS
[facility]  Pre Admit Instructions    Day and Date of Procedure: Friday 12/27/19  Arrival 7am      · Call your doctor if you become ill before your surgery  · Someone will call you between 1 p.m. And 5 p.m.the workday before the procedure to give you an arrival time       - Before 7 a.m. Enter through Emergency Room       - 7 a.m. To 5 p.m. Enter through Patient Registration Main Lobby  · You must have a responsible  to bring you home    Do NOT eat anything past: midnight  Ok for clear liquids until: 4am  Examples of clear liquids are: water, juice, or gatorade - NO Dairy     Please    · Do not wear makeup, jewelry, nail polish or body piercings  · Bring containers/solution for contacts, dentures, bridges - these and hearing aids will be removed before your procedure  · Do not bring cash, jewelry or valuables the day of your procedure   · No smoking at least 24 hours before your procedure  · Wear clothing that is comfortable and easy to take off and put on  · Do NOT shave for at least 5 days before your surgery    Review skin preparation handout before using. Shower with Hibiclens the Night before and morning of the procedure.                 Information about your stay (Please Review)    Before Surgery  1. Cafeteria Meals: 7am to 10am; 11am to 1:30 pm; Dinner/Supper must may be ordered between 11:00 am and 4 pm from the [facility] After [employer] ([employer]) Menu. Food will be available to  between 5 pm and 6 pm. The kitchen phone extension is [phone].  2. Your doctor may order and review labs, x-rays, ECG or other tests as a pre-surgery workup and will call you if there is need for follow up.  3. No smoking inside or outside the hospital on hospital grounds.  4. Wear clothing that is easy to take off and put on.  The hospital will provide you with a gown.  5. You may bring robe, slippers, nightwear, and toiletries (toothbrush, toothpaste, makeup).  6. If your doctor orders a Fleets Enema or other prep,  follow package and/or doctors orders.  7. Brush your teeth and rinse your mouth the morning of surgery, but dont swallow the water.  8. The nurse will ask questions and check your condition.  The doctor may dennis your surgical site.  9. Compression boots may be put on your calves to reduce the risk of blood clots.  10. The doctor may order medicine to help you relax before surgery.  After Surgery  1. The nurse will check your temperature, breathing, blood pressure, heart rate, IV site, and surgery site.  2. A diet will be ordered-most start with ice chips and then advance slowly to other foods.  3. If you have IV fluids the IV pump will beep to let the nurse know that she needs to check it.  4. You may have a urinary catheter and staff may measure your oral intake and urine output.  5. Pain medication may be ordered by the doctor after surgery.  If you have a pain management device tell your caretakers not to press the button because of OVERDOSE RISK.  6. When the nurse or doctor tells you it is okay to get out of bed, ask for help until you are stable.  7. The nurse may ask you to turn, cough, and deep breathe to prevent lung problems.  You can use a pillow to hold your incision when you deep breathe or cough to reduce pain.  8. The nurse will give you discharge instructions--incision care, symptoms to report to your doctor, and your follow-up appointment when you are discharged.  You cannot drive yourself home.  Goal for Discharge from One Day Surgery  · Control pain with an oral medication  · Walk without feeling dizzy or weak  · Tolerate liquids well  · Urinate without difficulty    Things you can do to  Reduce the Risk of Infections or Complications  Wash Hands and use Waterless Hand Sanitizers  · Wash hands frequently with soap and warm water for at least 15 seconds.   · Use hand sanitizers (alcohol based) often at home and in public if hands are not visibly soiled  Take Antibiotic Exactly as Prescribed  · Do  not stop antibiotics too soon; you risk developing infection resistant to antibiotics  · Take your antibiotic even if you are feeling better and even if they upset your stomach  · Call the doctor if you cant tolerate the antibiotic or you have an allergic reaction  Stay Healthy  · Take medicines as prescribed by your doctor  · Keep your diabetes under control - diet and medication  · Get enough rest, exercise and eat a healthy diet  Keep the Wound Clean and Dry  · Wash hands before and after taking care of the incision (cut)  · Wash hands when you remove a dressing, before you touch/apply a new dressing  · Shower and clean incision with antibacterial soap and rinse well if the doctor approves  · Allow the cut to dry completely before putting on a clean dressing  · Do not touch the part of the bandage that will cover the incision  · Do not use ointments unless your doctor tells you to-can promote bacterial growth  · If ordered, put ointment directly on the dressing-do not touch the end of the tube  · Do not scrub, remove scabs, or leave a damp dressing on the incision  · Do not use peroxide or alcohol to clean the incision unless the doctor tells you to   · Do not let children, pets or anyone else contaminate the incision  Stop Smoking To Prevent Infection  · Stop smoking-Centers for Disease Control recommends 30 days before surgery  · Smokers get more infections after surgery-studies have shown 6 times the risk  · Smokers have more scarring and heal slower-open wounds get infected easier  Prevent Respiratory complications  · Stop smoking  · Turn, cough, and deep breathe even if you have some pain when you do so.  · Splint your incision with a pillow when you cough/deep breath, to help control pain.  · Do not lie in one position for long periods of time.   Prevent Blood Clots  · When you wake move your legs, flex your feet, rotate your ankles, wiggle your toes  · Get up when the doctor says its ok.  Dangle your  feet from the side of the bed  · Report symptoms-leg pain, redness/swelling, warm to touch; fever; shortness of breath, chest pain, severe upper back pain.

## 2019-12-20 NOTE — ANESTHESIA PREPROCEDURE EVALUATION
12/20/2019  Deneen Borrego is a 34 y.o., female.    Pre-op Assessment    I have reviewed the Patient Summary Reports.    I have reviewed the Nursing Notes.   I have reviewed the Medications.     Review of Systems  Anesthesia Hx:  No problems with previous Anesthesia  History of prior surgery of interest to airway management or planning:  Denies Personal Hx of Anesthesia complications.   Social:  Non-Smoker, No Alcohol Use    Hematology/Oncology:  Hematology Normal   Oncology Normal     EENT/Dental:EENT/Dental Normal   Cardiovascular:   Exercise tolerance: good Denies Hypertension.  PVD: sciatica.    Pulmonary:  Pulmonary Normal    Renal/:  Renal/ Normal     Hepatic/GI:  Hepatic/GI Normal    Musculoskeletal:  Musculoskeletal Normal    Neurological:   Neuromuscular Disease, sciatica   Endocrine:   Hypothyroidism    Dermatological:  Skin Normal    Psych:  Psychiatric Normal           Physical Exam  General:  Obesity    Airway/Jaw/Neck:  Airway Findings: Mouth Opening: Normal Tongue: Normal  General Airway Assessment: Adult  Mallampati: II  Jaw/Neck Findings:     Neck ROM: Normal ROM      Dental:  Dental Findings: In tact        Mental Status:  Mental Status Findings:  Cooperative, Alert and Oriented         Anesthesia Plan  Type of Anesthesia, risks & benefits discussed:  Anesthesia Type:  general  Patient's Preference:   Intra-op Monitoring Plan: standard ASA monitors  Intra-op Monitoring Plan Comments:   Post Op Pain Control Plan: multimodal analgesia  Post Op Pain Control Plan Comments:   Induction:   IV  Beta Blocker:  Patient is not currently on a Beta-Blocker (No further documentation required).       Informed Consent: Patient understands risks and agrees with Anesthesia plan.  Questions answered. Anesthesia consent signed with patient.  ASA Score: 2     Day of Surgery Review of History & Physical:  I have interviewed and examined the patient. I have reviewed the patient's H&P dated: 12/27/19. There are no significant changes.  H&P update referred to the surgeon.

## 2019-12-27 ENCOUNTER — HOSPITAL ENCOUNTER (OUTPATIENT)
Facility: HOSPITAL | Age: 34
Discharge: HOME | End: 2019-12-27
Attending: OBSTETRICS & GYNECOLOGY | Admitting: OBSTETRICS & GYNECOLOGY
Payer: COMMERCIAL

## 2019-12-27 ENCOUNTER — ANESTHESIA (OUTPATIENT)
Dept: SURGERY | Facility: HOSPITAL | Age: 34
End: 2019-12-27
Payer: COMMERCIAL

## 2019-12-27 VITALS
TEMPERATURE: 97 F | HEIGHT: 63 IN | BODY MASS INDEX: 45.18 KG/M2 | DIASTOLIC BLOOD PRESSURE: 75 MMHG | OXYGEN SATURATION: 98 % | HEART RATE: 57 BPM | SYSTOLIC BLOOD PRESSURE: 149 MMHG | RESPIRATION RATE: 18 BRPM | WEIGHT: 255 LBS

## 2019-12-27 DIAGNOSIS — N93.9 ABNORMAL UTERINE BLEEDING (AUB): ICD-10-CM

## 2019-12-27 DIAGNOSIS — N94.6 DYSMENORRHEA: ICD-10-CM

## 2019-12-27 DIAGNOSIS — O34.10 UTERINE FIBROID IN PREGNANCY: Primary | ICD-10-CM

## 2019-12-27 DIAGNOSIS — D25.9 UTERINE LEIOMYOMA, UNSPECIFIED LOCATION: ICD-10-CM

## 2019-12-27 DIAGNOSIS — D25.9 UTERINE FIBROID IN PREGNANCY: Primary | ICD-10-CM

## 2019-12-27 LAB
ABO + RH BLD: NORMAL
BLD GP AB SCN CELLS X3 SERPL QL: NORMAL
POCT GLUCOSE: 77 MG/DL (ref 70–110)

## 2019-12-27 PROCEDURE — 88307 PR  SURG PATH,LEVEL V: ICD-10-PCS | Mod: 26,,, | Performed by: PATHOLOGY

## 2019-12-27 PROCEDURE — 37000009 HC ANESTHESIA EA ADD 15 MINS: Performed by: OBSTETRICS & GYNECOLOGY

## 2019-12-27 PROCEDURE — 58571 PR LAPAROSCOPY W TOT HYSTERECTUTERUS <=250 GRAM  W TUBE/OVARY: ICD-10-PCS | Mod: ,,, | Performed by: OBSTETRICS & GYNECOLOGY

## 2019-12-27 PROCEDURE — 25000003 PHARM REV CODE 250: Performed by: OBSTETRICS & GYNECOLOGY

## 2019-12-27 PROCEDURE — 36000711: Performed by: OBSTETRICS & GYNECOLOGY

## 2019-12-27 PROCEDURE — 88307 TISSUE EXAM BY PATHOLOGIST: CPT | Performed by: PATHOLOGY

## 2019-12-27 PROCEDURE — 58571 PR LAPAROSCOPY W TOT HYSTERECTUTERUS <=250 GRAM  W TUBE/OVARY: ICD-10-PCS | Mod: 80,,, | Performed by: OBSTETRICS & GYNECOLOGY

## 2019-12-27 PROCEDURE — 58571 TLH W/T/O 250 G OR LESS: CPT | Mod: ,,, | Performed by: OBSTETRICS & GYNECOLOGY

## 2019-12-27 PROCEDURE — 63600175 PHARM REV CODE 636 W HCPCS: Performed by: OBSTETRICS & GYNECOLOGY

## 2019-12-27 PROCEDURE — 37000008 HC ANESTHESIA 1ST 15 MINUTES: Performed by: OBSTETRICS & GYNECOLOGY

## 2019-12-27 PROCEDURE — 63600175 PHARM REV CODE 636 W HCPCS: Performed by: NURSE ANESTHETIST, CERTIFIED REGISTERED

## 2019-12-27 PROCEDURE — 82962 GLUCOSE BLOOD TEST: CPT | Performed by: OBSTETRICS & GYNECOLOGY

## 2019-12-27 PROCEDURE — 86850 RBC ANTIBODY SCREEN: CPT

## 2019-12-27 PROCEDURE — 88307 TISSUE EXAM BY PATHOLOGIST: CPT | Mod: 26,,, | Performed by: PATHOLOGY

## 2019-12-27 PROCEDURE — 25000003 PHARM REV CODE 250: Performed by: NURSE ANESTHETIST, CERTIFIED REGISTERED

## 2019-12-27 PROCEDURE — 00840 ANES IPER PX LOWER ABD NOS: CPT | Mod: QZ,P2 | Performed by: NURSE ANESTHETIST, CERTIFIED REGISTERED

## 2019-12-27 PROCEDURE — 36000710: Performed by: OBSTETRICS & GYNECOLOGY

## 2019-12-27 PROCEDURE — 58571 TLH W/T/O 250 G OR LESS: CPT | Mod: 80,,, | Performed by: OBSTETRICS & GYNECOLOGY

## 2019-12-27 PROCEDURE — 71000033 HC RECOVERY, INTIAL HOUR: Performed by: OBSTETRICS & GYNECOLOGY

## 2019-12-27 PROCEDURE — 36415 COLL VENOUS BLD VENIPUNCTURE: CPT

## 2019-12-27 PROCEDURE — 27201423 OPTIME MED/SURG SUP & DEVICES STERILE SUPPLY: Performed by: OBSTETRICS & GYNECOLOGY

## 2019-12-27 RX ORDER — KETOROLAC TROMETHAMINE 30 MG/ML
INJECTION, SOLUTION INTRAMUSCULAR; INTRAVENOUS
Status: DISCONTINUED | OUTPATIENT
Start: 2019-12-27 | End: 2019-12-27

## 2019-12-27 RX ORDER — IBUPROFEN 800 MG/1
800 TABLET ORAL EVERY 8 HOURS PRN
Qty: 30 TABLET | Refills: 0 | Status: SHIPPED | OUTPATIENT
Start: 2019-12-27 | End: 2020-04-15

## 2019-12-27 RX ORDER — DEXAMETHASONE SODIUM PHOSPHATE 4 MG/ML
INJECTION, SOLUTION INTRA-ARTICULAR; INTRALESIONAL; INTRAMUSCULAR; INTRAVENOUS; SOFT TISSUE
Status: DISCONTINUED | OUTPATIENT
Start: 2019-12-27 | End: 2019-12-27

## 2019-12-27 RX ORDER — ACETAMINOPHEN 10 MG/ML
INJECTION, SOLUTION INTRAVENOUS
Status: DISCONTINUED | OUTPATIENT
Start: 2019-12-27 | End: 2019-12-27

## 2019-12-27 RX ORDER — SODIUM CHLORIDE, SODIUM LACTATE, POTASSIUM CHLORIDE, CALCIUM CHLORIDE 600; 310; 30; 20 MG/100ML; MG/100ML; MG/100ML; MG/100ML
INJECTION, SOLUTION INTRAVENOUS CONTINUOUS
Status: DISCONTINUED | OUTPATIENT
Start: 2019-12-27 | End: 2019-12-27 | Stop reason: HOSPADM

## 2019-12-27 RX ORDER — NEOSTIGMINE METHYLSULFATE 5 MG/5 ML
SYRINGE (ML) INTRAVENOUS
Status: DISCONTINUED | OUTPATIENT
Start: 2019-12-27 | End: 2019-12-27

## 2019-12-27 RX ORDER — LIDOCAINE HYDROCHLORIDE 20 MG/ML
INJECTION, SOLUTION EPIDURAL; INFILTRATION; INTRACAUDAL; PERINEURAL
Status: DISCONTINUED | OUTPATIENT
Start: 2019-12-27 | End: 2019-12-27

## 2019-12-27 RX ORDER — SODIUM CHLORIDE, SODIUM LACTATE, POTASSIUM CHLORIDE, CALCIUM CHLORIDE 600; 310; 30; 20 MG/100ML; MG/100ML; MG/100ML; MG/100ML
INJECTION, SOLUTION INTRAVENOUS CONTINUOUS PRN
Status: DISCONTINUED | OUTPATIENT
Start: 2019-12-27 | End: 2019-12-27

## 2019-12-27 RX ORDER — ONDANSETRON HYDROCHLORIDE 2 MG/ML
INJECTION, SOLUTION INTRAMUSCULAR; INTRAVENOUS
Status: DISCONTINUED | OUTPATIENT
Start: 2019-12-27 | End: 2019-12-27

## 2019-12-27 RX ORDER — DIPHENHYDRAMINE HYDROCHLORIDE 50 MG/ML
25 INJECTION INTRAMUSCULAR; INTRAVENOUS EVERY 4 HOURS PRN
Status: DISCONTINUED | OUTPATIENT
Start: 2019-12-27 | End: 2019-12-27 | Stop reason: HOSPADM

## 2019-12-27 RX ORDER — ROCURONIUM BROMIDE 10 MG/ML
INJECTION, SOLUTION INTRAVENOUS
Status: DISCONTINUED | OUTPATIENT
Start: 2019-12-27 | End: 2019-12-27

## 2019-12-27 RX ORDER — CEFAZOLIN SODIUM 1 G/3ML
INJECTION, POWDER, FOR SOLUTION INTRAMUSCULAR; INTRAVENOUS
Status: DISCONTINUED | OUTPATIENT
Start: 2019-12-27 | End: 2019-12-27

## 2019-12-27 RX ORDER — ONDANSETRON 8 MG/1
8 TABLET, ORALLY DISINTEGRATING ORAL EVERY 8 HOURS PRN
Status: DISCONTINUED | OUTPATIENT
Start: 2019-12-27 | End: 2019-12-27 | Stop reason: HOSPADM

## 2019-12-27 RX ORDER — MIDAZOLAM HYDROCHLORIDE 1 MG/ML
INJECTION INTRAMUSCULAR; INTRAVENOUS
Status: DISCONTINUED | OUTPATIENT
Start: 2019-12-27 | End: 2019-12-27

## 2019-12-27 RX ORDER — OXYCODONE AND ACETAMINOPHEN 10; 325 MG/1; MG/1
1 TABLET ORAL EVERY 4 HOURS PRN
Status: DISCONTINUED | OUTPATIENT
Start: 2019-12-27 | End: 2019-12-27 | Stop reason: HOSPADM

## 2019-12-27 RX ORDER — OXYCODONE AND ACETAMINOPHEN 5; 325 MG/1; MG/1
1 TABLET ORAL EVERY 4 HOURS PRN
Status: DISCONTINUED | OUTPATIENT
Start: 2019-12-27 | End: 2019-12-27

## 2019-12-27 RX ORDER — HYDROMORPHONE HYDROCHLORIDE 1 MG/ML
1 INJECTION, SOLUTION INTRAMUSCULAR; INTRAVENOUS; SUBCUTANEOUS EVERY 6 HOURS PRN
Status: DISCONTINUED | OUTPATIENT
Start: 2019-12-27 | End: 2019-12-27 | Stop reason: HOSPADM

## 2019-12-27 RX ORDER — OXYCODONE AND ACETAMINOPHEN 5; 325 MG/1; MG/1
1 TABLET ORAL EVERY 4 HOURS PRN
Status: DISCONTINUED | OUTPATIENT
Start: 2019-12-27 | End: 2019-12-27 | Stop reason: HOSPADM

## 2019-12-27 RX ORDER — BISACODYL 10 MG
10 SUPPOSITORY, RECTAL RECTAL DAILY PRN
Status: DISCONTINUED | OUTPATIENT
Start: 2019-12-27 | End: 2019-12-27 | Stop reason: HOSPADM

## 2019-12-27 RX ORDER — KETOROLAC TROMETHAMINE 30 MG/ML
30 INJECTION, SOLUTION INTRAMUSCULAR; INTRAVENOUS EVERY 8 HOURS
Status: DISCONTINUED | OUTPATIENT
Start: 2019-12-27 | End: 2019-12-27 | Stop reason: HOSPADM

## 2019-12-27 RX ORDER — DIPHENHYDRAMINE HCL 25 MG
25 CAPSULE ORAL EVERY 4 HOURS PRN
Status: DISCONTINUED | OUTPATIENT
Start: 2019-12-27 | End: 2019-12-27 | Stop reason: HOSPADM

## 2019-12-27 RX ORDER — PROPOFOL 10 MG/ML
VIAL (ML) INTRAVENOUS
Status: DISCONTINUED | OUTPATIENT
Start: 2019-12-27 | End: 2019-12-27

## 2019-12-27 RX ORDER — IBUPROFEN 600 MG/1
600 TABLET ORAL EVERY 6 HOURS
Status: DISCONTINUED | OUTPATIENT
Start: 2019-12-28 | End: 2019-12-27 | Stop reason: HOSPADM

## 2019-12-27 RX ORDER — HYDROCODONE BITARTRATE AND ACETAMINOPHEN 5; 325 MG/1; MG/1
1 TABLET ORAL EVERY 4 HOURS PRN
Qty: 14 TABLET | Refills: 0 | Status: SHIPPED | OUTPATIENT
Start: 2019-12-27 | End: 2020-04-15

## 2019-12-27 RX ORDER — GLYCOPYRROLATE 0.2 MG/ML
INJECTION INTRAMUSCULAR; INTRAVENOUS
Status: DISCONTINUED | OUTPATIENT
Start: 2019-12-27 | End: 2019-12-27

## 2019-12-27 RX ORDER — HYDROMORPHONE HYDROCHLORIDE 2 MG/ML
INJECTION, SOLUTION INTRAMUSCULAR; INTRAVENOUS; SUBCUTANEOUS
Status: DISCONTINUED | OUTPATIENT
Start: 2019-12-27 | End: 2019-12-27

## 2019-12-27 RX ORDER — MUPIROCIN 20 MG/G
1 OINTMENT TOPICAL 2 TIMES DAILY
Status: DISCONTINUED | OUTPATIENT
Start: 2019-12-27 | End: 2019-12-27 | Stop reason: HOSPADM

## 2019-12-27 RX ORDER — FENTANYL CITRATE 50 UG/ML
INJECTION, SOLUTION INTRAMUSCULAR; INTRAVENOUS
Status: DISCONTINUED | OUTPATIENT
Start: 2019-12-27 | End: 2019-12-27

## 2019-12-27 RX ADMIN — OXYCODONE HYDROCHLORIDE AND ACETAMINOPHEN 1 TABLET: 10; 325 TABLET ORAL at 02:12

## 2019-12-27 RX ADMIN — HYDROMORPHONE HYDROCHLORIDE 1 MG: 2 INJECTION, SOLUTION INTRAMUSCULAR; INTRAVENOUS; SUBCUTANEOUS at 09:12

## 2019-12-27 RX ADMIN — FENTANYL CITRATE 100 MCG: 50 INJECTION, SOLUTION INTRAMUSCULAR; INTRAVENOUS at 08:12

## 2019-12-27 RX ADMIN — ACETAMINOPHEN 1000 MG: 10 INJECTION, SOLUTION INTRAVENOUS at 08:12

## 2019-12-27 RX ADMIN — SODIUM CHLORIDE, SODIUM LACTATE, POTASSIUM CHLORIDE, AND CALCIUM CHLORIDE: .6; .31; .03; .02 INJECTION, SOLUTION INTRAVENOUS at 10:12

## 2019-12-27 RX ADMIN — MIDAZOLAM HYDROCHLORIDE 2 MG: 1 INJECTION, SOLUTION INTRAMUSCULAR; INTRAVENOUS at 08:12

## 2019-12-27 RX ADMIN — PROPOFOL 160 MG: 10 INJECTION, EMULSION INTRAVENOUS at 08:12

## 2019-12-27 RX ADMIN — GLYCOPYRROLATE 0.4 MG: 0.2 INJECTION INTRAMUSCULAR; INTRAVENOUS at 09:12

## 2019-12-27 RX ADMIN — CEFAZOLIN 3 G: 1 INJECTION, POWDER, FOR SOLUTION INTRAVENOUS at 08:12

## 2019-12-27 RX ADMIN — SODIUM CHLORIDE, SODIUM LACTATE, POTASSIUM CHLORIDE, AND CALCIUM CHLORIDE: .6; .31; .03; .02 INJECTION, SOLUTION INTRAVENOUS at 09:12

## 2019-12-27 RX ADMIN — DEXAMETHASONE SODIUM PHOSPHATE 8 MG: 4 INJECTION, SOLUTION INTRAMUSCULAR; INTRAVENOUS at 08:12

## 2019-12-27 RX ADMIN — SODIUM CHLORIDE, SODIUM LACTATE, POTASSIUM CHLORIDE, AND CALCIUM CHLORIDE: .6; .31; .03; .02 INJECTION, SOLUTION INTRAVENOUS at 08:12

## 2019-12-27 RX ADMIN — LIDOCAINE HYDROCHLORIDE 80 MG: 20 INJECTION, SOLUTION EPIDURAL; INFILTRATION; INTRACAUDAL; PERINEURAL at 08:12

## 2019-12-27 RX ADMIN — Medication 3 MG: at 09:12

## 2019-12-27 RX ADMIN — FENTANYL CITRATE 50 MCG: 50 INJECTION, SOLUTION INTRAMUSCULAR; INTRAVENOUS at 08:12

## 2019-12-27 RX ADMIN — KETOROLAC TROMETHAMINE 30 MG: 30 INJECTION, SOLUTION INTRAMUSCULAR; INTRAVENOUS at 09:12

## 2019-12-27 RX ADMIN — ROCURONIUM BROMIDE 40 MG: 10 INJECTION, SOLUTION INTRAVENOUS at 08:12

## 2019-12-27 RX ADMIN — ONDANSETRON 8 MG: 2 INJECTION, SOLUTION INTRAMUSCULAR; INTRAVENOUS at 08:12

## 2019-12-27 NOTE — DISCHARGE SUMMARY
[facility]  Obstetrics & Gynecology  Discharge Summary    Patient Name: Deneen Borrego  MRN: [id number]  Admission Date: 12/27/2019  Hospital Length of Stay: 0 days  Discharge Date and Time:  12/27/2019  Attending Physician: Josiane Mills MD   Discharging Provider: Josiane Mills MD  Primary Care Provider: Go Corrigan MD    HPI:  No notes on file    Hospital Course:  No notes on file    Procedure(s) (LRB):  HYSTERECTOMY, TOTAL, LAPAROSCOPIC (N/A)  SALPINGECTOMY, LAPAROSCOPIC (Bilateral)         Significant Diagnostic Studies: Labs: CBC No results for input(s): WBC, HGB, HCT, PLT in the last 48 hours.    Pending Diagnostic Studies:     Procedure Component Value Units Date/Time    Specimen to Pathology, Surgery Gynecology and Obstetrics [[id number]] Collected:  12/27/19 0953    Order Status:  Sent Lab Status:  In process Updated:  12/27/19 1144        Final Active Diagnoses:    Diagnosis Date Noted POA    PRINCIPAL PROBLEM:  Abnormal uterine bleeding (AUB) [N93.9] 12/27/2019 Yes    Dysmenorrhea [N94.6] 12/27/2019 Yes    Uterine fibroid in pregnancy [O34.10, D25.9] 08/17/2018 Yes      Problems Resolved During this Admission:        Discharged Condition: good    Disposition: Home or Self Care    Follow Up:    Patient Instructions:      Diet general     Pelvic Rest     Lifting restrictions     Call MD for:  redness, tenderness, or signs of infection (pain, swelling, redness, odor or green/yellow discharge around incision site)     Call MD for:  difficulty breathing, headache or visual disturbances     Call MD for:  severe uncontrolled pain     Call MD for:  persistent nausea and vomiting     Call MD for:  temperature >100.4     No dressing needed     Medications:  Reconciled Home Medications:      Medication List      START taking these medications    HYDROcodone-acetaminophen 5-325 mg per tablet  Commonly known as:  NORCO  Take 1 tablet by mouth every 4 (four) hours as  needed for Pain.     ibuprofen 800 MG tablet  Commonly known as:  ADVIL,MOTRIN  Take 1 tablet (800 mg total) by mouth every 8 (eight) hours as needed for Pain.        CONTINUE taking these medications    Cimzia 400 mg/2 mL (200 mg/mL x 2) Sykt  Inject 400 mg into the skin.     escitalopram oxalate 10 MG tablet  Commonly known as:  Lexapro  Take 1 tablet (10 mg total) by mouth once daily.     levothyroxine 50 MCG tablet  Commonly known as:  SYNTHROID  Take 1 tablet (50 mcg total) by mouth once daily.     triamcinolone acetonide 0.1% 0.1 % cream  Commonly known as:  KENALOG  Apply topically 2 (two) times daily.            Josiane Mills MD  Obstetrics & Gynecology  [facility]

## 2019-12-27 NOTE — TRANSFER OF CARE
"Anesthesia Transfer of Care Note    Patient: Deneen Borrego    Procedure(s) Performed: Procedure(s) (LRB):  HYSTERECTOMY, TOTAL, LAPAROSCOPIC (N/A)  SALPINGECTOMY, LAPAROSCOPIC (Bilateral)    Patient location: PACU    Anesthesia Type: general    Transport from OR: Transported from OR on 6-10 L/min O2 by face mask with adequate spontaneous ventilation    Post pain: adequate analgesia    Post assessment: no apparent anesthetic complications and tolerated procedure well    Post vital signs: stable    Level of consciousness: sedated    Nausea/Vomiting: no nausea/vomiting    Complications: none    Transfer of care protocol was followed      Last vitals:   Visit Vitals  BP (!) 159/68 (BP Location: Left arm, Patient Position: Lying)   Pulse (!) 51   Temp 35.8 °C (96.5 °F) (Tympanic)   Resp 18   Ht 5' 3" (1.6 m)   Wt 115.7 kg (255 lb)   LMP 12/26/2019   SpO2 100%   Breastfeeding? No   BMI 45.17 kg/m²     "

## 2019-12-27 NOTE — ANESTHESIA POSTPROCEDURE EVALUATION
Anesthesia Post Evaluation    Patient: Deneen Borrego    Procedure(s) Performed: Procedure(s) (LRB):  HYSTERECTOMY, TOTAL, LAPAROSCOPIC (N/A)  SALPINGECTOMY, LAPAROSCOPIC (Bilateral)    Final Anesthesia Type: general    Patient location during evaluation: PACU  Patient participation: Yes- Able to Participate  Level of consciousness: awake and alert and oriented  Post-procedure vital signs: reviewed and stable  Pain management: adequate  Airway patency: patent    PONV status at discharge: No PONV  Anesthetic complications: no      Cardiovascular status: blood pressure returned to baseline and hemodynamically stable  Respiratory status: unassisted, spontaneous ventilation and room air  Hydration status: euvolemic  Follow-up not needed.          Vitals Value Taken Time   /68 12/27/2019 10:46 AM   Temp 35.8 °C (96.5 °F) 12/27/2019 10:46 AM   Pulse 51 12/27/2019 10:46 AM   Resp 18 12/27/2019 10:46 AM   SpO2 100 % 12/27/2019 10:46 AM         Event Time     Out of Recovery 10:39:21          Pain/Garcia Score: Garcia Score: 10 (12/27/2019 10:11 AM)

## 2019-12-27 NOTE — NURSING
Patient transported to third floor room 313 in preparation for surgery.  NADN.  AAOx3 with no complaints of pain.  Patient oriented to room, bed in low position, side rails up x 2, call bell in reach.  SCDs applied.  Instructed to call with needs.

## 2019-12-27 NOTE — INTERVAL H&P NOTE
The patient has been examined and the H&P has been reviewed:        I concur with the findings and no changes have occurred since H&P was written.        Patient cleared for Anesthesia: General        Anesthesia/Surgery risks, benefits and alternative options discussed and understood by patient/family.      Active Hospital Problems    Diagnosis  POA    Dysmenorrhea [N94.6]  Yes      Resolved Hospital Problems   No resolved problems to display.

## 2019-12-27 NOTE — OP NOTE
Date: 12/27/19    Procedure: TLH/BS     Surgeon: Josiane Mills MD    Assistant: Patrick Garrido MD    Pre-op Dx:   AUB  Uterine fibroids  Dysmenorrhea    Post-op Dx: same    Anesthesia: GETA    EBL: <50 cc    IVFs: 1000 cc    DVT prophylaxis: Bilateral sequential compression devices    Perioperative antibiotics: Ancef    Specimen: Uterus, cervix, bilateral fallopian tubes    Operative Findings: Normal ovaries and tubes. Uterus with large 3 cm posterior fibroid. Peristalsis of bilateral ureters noted intra-op.     Complications: None    PROCEDURE IN DETAIL:     The patient was  transferred to the Operating Room, where general endotracheal anesthesia was administered without complication. She was placed in the dorsal lithotomy position in [facility]. The patient's abdomen and perineum were prepped in the normal sterile fashion. A Garrison catheter was inserted into the bladder. The patient was draped.  A 10 mm skin incision was made in the patient's infraumbilical region. Veress needle was inserted into this location with proper intraperitoneal placement, confirmed with the saline drop test and low intraabdominal pressure.  The abdomen was insufflated. The 11 mm infraumbilical trocar was inserted under direct laparoscopic visualization. Next, two 10 mm skin incisions were made in the patient's lower lateral abdomen bilaterally. 10 mm trocars were placed  under direct laparoscopic visualization. The patient was placed in steep Trendelenburg position. The abdomen and pelvis were surveyed with the above noted findings.  The sonicision and Gyrus was used to coagulate the patient's uteroovarian ligament on the left. The patient's round ligament was also coagulated on the left. Sonicision was used to create a bladder flap. This was done on the patient's right in which the sonicision and Gyrus was also used to coagulate and cut the uteroovarian ligament followed by the round ligament and then created the bladder  flap. Next, the peritoneum was further dissected anteriorly and posteriorly. The uterine arteries were skeletonized and then coagulated using the Gyrus. They were cut with the sonicision bilaterally.The sonicision was used to cut along the sponge stick both anterior and posterior to create a colpotomy. The sonicision was then used to further coagulate the cardinal ligaments. The uterus and cervix was then amputated free from the vagina and the uterus was then removed vaginally and a vaginal packing was placed. Endo-stitch was used to place vaginal cuff angles on either side with a #0 Vicryl suture as well as closing the entire cuff length. Next, the ports were removed under direct laparoscopic visualization with excellent hemostasis noted. Skin incisions were closed using a #3-0 Vicryl in a running fashion. Dermoplast was placed on the skin incision.  All instruments were removed. Sponge and instrument count was correct x2. The patient tolerated the procedure well and was transferred to recovery in stable condition.

## 2019-12-27 NOTE — NURSING
Patient in stable condition. Ambulating, voiding and tolerating PO well. No complaints of abdominal pain.  Denies nausea, vomiting. Incisions to abdomen dry and intact with no redness, swelling or drainage noted.  Reviewed discharge instructions, medications, follow up appointments and reportable signs and symptoms with patient and significant other.  Reinforced instruction of nothing in the vaginal canal until notified by MD.  Patient voiced understanding.  Discharged home via wheelchair accompanied to lobby with transport staff.

## 2019-12-31 LAB
FINAL PATHOLOGIC DIAGNOSIS: NORMAL
GROSS: NORMAL

## 2020-01-10 ENCOUNTER — OFFICE VISIT (OUTPATIENT)
Dept: OBSTETRICS AND GYNECOLOGY | Facility: CLINIC | Age: 35
End: 2020-01-10
Payer: COMMERCIAL

## 2020-01-10 VITALS
BODY MASS INDEX: 45.89 KG/M2 | HEIGHT: 63 IN | HEART RATE: 88 BPM | RESPIRATION RATE: 10 BRPM | SYSTOLIC BLOOD PRESSURE: 120 MMHG | WEIGHT: 259 LBS | DIASTOLIC BLOOD PRESSURE: 78 MMHG

## 2020-01-10 DIAGNOSIS — Z90.710 S/P LAPAROSCOPIC HYSTERECTOMY: Primary | ICD-10-CM

## 2020-01-10 DIAGNOSIS — Z51.89 VISIT FOR WOUND CHECK: ICD-10-CM

## 2020-01-10 PROCEDURE — 99024 PR POST-OP FOLLOW-UP VISIT: ICD-10-PCS | Mod: S$GLB,,, | Performed by: OBSTETRICS & GYNECOLOGY

## 2020-01-10 PROCEDURE — 99024 POSTOP FOLLOW-UP VISIT: CPT | Mod: S$GLB,,, | Performed by: OBSTETRICS & GYNECOLOGY

## 2020-01-10 PROCEDURE — 99999 PR PBB SHADOW E&M-EST. PATIENT-LVL III: CPT | Mod: PBBFAC,,, | Performed by: OBSTETRICS & GYNECOLOGY

## 2020-01-10 PROCEDURE — 99999 PR PBB SHADOW E&M-EST. PATIENT-LVL III: ICD-10-PCS | Mod: PBBFAC,,, | Performed by: OBSTETRICS & GYNECOLOGY

## 2020-01-10 NOTE — PROGRESS NOTES
Chief Complaint   Patient presents with    Post-op Evaluation       Deneen Borrego is a 34 y.o. female  post-op from a TLH/BS on 19.  Patient is Doing well postoperatively..      Patient reports pain well controlled. Bowel and bladder function normal. Good appetite. No fever or chills. No abnormal vaginal bleeding or discharge.     The pathology revealed:  Uterus and cervix (126 g):   -Posterior subserosal leiomyoma, 3.5 cm   -Very early proliferative endometrium.  Glands are free of hyperplasia and   atypia.   -Adenomyosis   -Squamous metaplasia, chronic inflammation, and early Nabothian cyst   formation of the cervix     Fallopian tubes and fimbria (2):   -No intrinsic histopathologic abnormalities of either tube     Past Medical History:   Diagnosis Date    Advanced maternal age in multigravida, second trimester     Hypothyroid      Past Surgical History:   Procedure Laterality Date     SECTION      x1     SECTION N/A 3/26/2019    Procedure: REPEAT  SECTION;  Surgeon: Miko Albert MD;  Location: [facility];  Service: OB/GYN;  Laterality: N/A;    CHOLECYSTECTOMY      LAPAROSCOPIC SALPINGECTOMY Bilateral 2019    Procedure: SALPINGECTOMY, LAPAROSCOPIC;  Surgeon: Josiane Mills MD;  Location: [facility] OR;  Service: OB/GYN;  Laterality: Bilateral;    LAPAROSCOPIC TOTAL HYSTERECTOMY N/A 2019    Procedure: HYSTERECTOMY, TOTAL, LAPAROSCOPIC;  Surgeon: Josiane Mills MD;  Location: [facility] OR;  Service: OB/GYN;  Laterality: N/A;    THYROIDECTOMY, PARTIAL       Family History   Problem Relation Age of Onset    Arrhythmia Maternal Grandfather     No Known Problems Mother     No Known Problems Father     Breast cancer Neg Hx     Colon cancer Neg Hx     Ovarian cancer Neg Hx     Cardiomyopathy Neg Hx     Congenital heart disease Neg Hx     Early death Neg Hx     Heart attacks under age 50 Neg Hx     Pacemaker/defibrilator Neg Hx      Social  "History     Tobacco Use    Smoking status: Never Smoker    Smokeless tobacco: Never Used   Substance Use Topics    Alcohol use: No     Frequency: Never    Drug use: No     OB History    Para Term  AB Living   3 3 3     3   SAB TAB Ectopic Multiple Live Births         0 3      # Outcome Date GA Lbr Tru/2nd Weight Sex Delivery Anes PTL Lv   3 Term 19 37w6d  2.92 kg (6 lb 7 oz) M CS-LTranv Spinal N JASIEL   2 Term 08 38w0d  4.309 kg (9 lb 8 oz) F CS-LTranv  N JASIEL   1 Term 00 41w0d  4.394 kg (9 lb 11 oz) F Vag-Spont  N JASIEL       /78   Pulse 88   Resp 10   Ht 5' 3" (1.6 m)   Wt 117.5 kg (259 lb)   LMP 2019   Breastfeeding? No   BMI 45.88 kg/m²     ROS:  GENERAL: No fever, chills, fatigability or weight loss.  VULVAR: No pain, no lesions and no itching.  VAGINAL: No relaxation, no itching, no discharge, no abnormal bleeding and no lesions.  ABDOMEN: No abdominal pain. Denies nausea. Denies vomiting. No diarrhea. No constipation  BREAST: Denies pain. No lumps. No discharge.  URINARY: No incontinence, no nocturia, no frequency and no dysuria.  CARDIOVASCULAR: No chest pain. No shortness of breath. No leg cramps.  NEUROLOGICAL: No headaches. No vision changes.    PE:   General appearance: alert, appears stated age and cooperative  Abdomen: soft, non-tender; bowel sounds normal; no masses,  no organomegaly  Incisions: healing well       ASSESSMENT:    1. S/P laparoscopic hysterectomy     2. Visit for wound check          PLAN:  1. Pathology reviewed  2. Post op surgical instructions  3. RTC in 4 weeks  "

## 2020-02-12 ENCOUNTER — OFFICE VISIT (OUTPATIENT)
Dept: OBSTETRICS AND GYNECOLOGY | Facility: CLINIC | Age: 35
End: 2020-02-12
Payer: COMMERCIAL

## 2020-02-12 VITALS
RESPIRATION RATE: 16 BRPM | SYSTOLIC BLOOD PRESSURE: 112 MMHG | WEIGHT: 263 LBS | HEART RATE: 88 BPM | DIASTOLIC BLOOD PRESSURE: 82 MMHG | HEIGHT: 63 IN | BODY MASS INDEX: 46.6 KG/M2

## 2020-02-12 DIAGNOSIS — Z90.710 S/P LAPAROSCOPIC HYSTERECTOMY: Primary | ICD-10-CM

## 2020-02-12 DIAGNOSIS — Z51.89 VISIT FOR WOUND CHECK: ICD-10-CM

## 2020-02-12 PROCEDURE — 99024 PR POST-OP FOLLOW-UP VISIT: ICD-10-PCS | Mod: S$GLB,,, | Performed by: OBSTETRICS & GYNECOLOGY

## 2020-02-12 PROCEDURE — 99024 POSTOP FOLLOW-UP VISIT: CPT | Mod: S$GLB,,, | Performed by: OBSTETRICS & GYNECOLOGY

## 2020-02-12 PROCEDURE — 99999 PR PBB SHADOW E&M-EST. PATIENT-LVL III: ICD-10-PCS | Mod: PBBFAC,,, | Performed by: OBSTETRICS & GYNECOLOGY

## 2020-02-12 PROCEDURE — 99999 PR PBB SHADOW E&M-EST. PATIENT-LVL III: CPT | Mod: PBBFAC,,, | Performed by: OBSTETRICS & GYNECOLOGY

## 2020-02-12 NOTE — PROGRESS NOTES
Chief Complaint   Patient presents with    Post-op Evaluation       Deneen Borrego is a 34 y.o. female  post-op from a TLH/BS on 19.  Patient is Doing well postoperatively..      Patient reports pain well controlled. Bowel and bladder function normal. Good appetite. No fever or chills. No abnormal vaginal bleeding or discharge.     The pathology revealed:  Uterus and cervix (126 g):   -Posterior subserosal leiomyoma, 3.5 cm   -Very early proliferative endometrium.  Glands are free of hyperplasia and   atypia.   -Adenomyosis   -Squamous metaplasia, chronic inflammation, and early Nabothian cyst   formation of the cervix     Fallopian tubes and fimbria (2):   -No intrinsic histopathologic abnormalities of either tube     Past Medical History:   Diagnosis Date    Advanced maternal age in multigravida, second trimester     Hypothyroid      Past Surgical History:   Procedure Laterality Date     SECTION      x1     SECTION N/A 3/26/2019    Procedure: REPEAT  SECTION;  Surgeon: Miko Albert MD;  Location: [facility];  Service: OB/GYN;  Laterality: N/A;    CHOLECYSTECTOMY      LAPAROSCOPIC SALPINGECTOMY Bilateral 2019    Procedure: SALPINGECTOMY, LAPAROSCOPIC;  Surgeon: Josiane Mills MD;  Location: [facility] OR;  Service: OB/GYN;  Laterality: Bilateral;    LAPAROSCOPIC TOTAL HYSTERECTOMY N/A 2019    Procedure: HYSTERECTOMY, TOTAL, LAPAROSCOPIC;  Surgeon: Josiane Mills MD;  Location: [facility] OR;  Service: OB/GYN;  Laterality: N/A;    THYROIDECTOMY, PARTIAL       Family History   Problem Relation Age of Onset    Arrhythmia Maternal Grandfather     No Known Problems Mother     No Known Problems Father     Breast cancer Neg Hx     Colon cancer Neg Hx     Ovarian cancer Neg Hx     Cardiomyopathy Neg Hx     Congenital heart disease Neg Hx     Early death Neg Hx     Heart attacks under age 50 Neg Hx     Pacemaker/defibrilator Neg Hx      Social  "History     Tobacco Use    Smoking status: Never Smoker    Smokeless tobacco: Never Used   Substance Use Topics    Alcohol use: No     Frequency: Never    Drug use: No     OB History    Para Term  AB Living   3 3 3     3   SAB TAB Ectopic Multiple Live Births         0 3      # Outcome Date GA Lbr Tru/2nd Weight Sex Delivery Anes PTL Lv   3 Term 19 37w6d  2.92 kg (6 lb 7 oz) M CS-LTranv Spinal N JASIEL   2 Term 08 38w0d  4.309 kg (9 lb 8 oz) F CS-LTranv  N JASIEL   1 Term 00 41w0d  4.394 kg (9 lb 11 oz) F Vag-Spont  N JASIEL       /82   Pulse 88   Resp 16   Ht 5' 3" (1.6 m)   Wt 119.3 kg (263 lb)   LMP 2019   BMI 46.59 kg/m²     ROS:  GENERAL: No fever, chills, fatigability or weight loss.  VULVAR: No pain, no lesions and no itching.  VAGINAL: No relaxation, no itching, no discharge, no abnormal bleeding and no lesions.  ABDOMEN: No abdominal pain. Denies nausea. Denies vomiting. No diarrhea. No constipation  BREAST: Denies pain. No lumps. No discharge.  URINARY: No incontinence, no nocturia, no frequency and no dysuria.  CARDIOVASCULAR: No chest pain. No shortness of breath. No leg cramps.  NEUROLOGICAL: No headaches. No vision changes.    PE:   General appearance: alert, appears stated age and cooperative  Abdomen: soft, non-tender; bowel sounds normal; no masses,  no organomegaly  Incisions: well healed  Pelvic: cuff intact and well healed; non tender       ASSESSMENT:    1. S/P laparoscopic hysterectomy     2. Visit for wound check          PLAN:  1.Surgical release  2. RTC for annual exam   "

## 2020-04-15 PROBLEM — F33.9 EPISODE OF RECURRENT MAJOR DEPRESSIVE DISORDER: Status: ACTIVE | Noted: 2020-04-15

## 2020-04-15 PROBLEM — O09.522 AMA (ADVANCED MATERNAL AGE) MULTIGRAVIDA 35+, SECOND TRIMESTER: Status: RESOLVED | Noted: 2018-09-13 | Resolved: 2020-04-15

## 2020-04-15 PROBLEM — O34.10 UTERINE FIBROID IN PREGNANCY: Status: RESOLVED | Noted: 2018-08-17 | Resolved: 2020-04-15

## 2020-04-15 PROBLEM — N94.6 DYSMENORRHEA: Status: RESOLVED | Noted: 2019-12-27 | Resolved: 2020-04-15

## 2020-04-15 PROBLEM — L40.9 PSORIASIS: Status: ACTIVE | Noted: 2020-04-15

## 2020-04-15 PROBLEM — E66.01 MORBID OBESITY: Status: ACTIVE | Noted: 2020-04-15

## 2020-04-15 PROBLEM — D25.9 UTERINE FIBROID IN PREGNANCY: Status: RESOLVED | Noted: 2018-08-17 | Resolved: 2020-04-15

## 2020-04-15 PROBLEM — O99.213 OBESITY AFFECTING PREGNANCY IN THIRD TRIMESTER: Status: RESOLVED | Noted: 2019-03-26 | Resolved: 2020-04-15

## 2020-04-15 PROBLEM — Z98.891 STATUS POST REPEAT LOW TRANSVERSE CESAREAN SECTION: Status: RESOLVED | Noted: 2019-03-26 | Resolved: 2020-04-15

## 2020-04-15 PROBLEM — F41.1 GENERALIZED ANXIETY DISORDER: Status: ACTIVE | Noted: 2020-04-15

## 2020-04-15 PROBLEM — Z98.891 HISTORY OF CESAREAN DELIVERY: Status: RESOLVED | Noted: 2018-09-13 | Resolved: 2020-04-15

## 2020-04-15 PROBLEM — Z34.93 ENCOUNTER FOR SUPERVISION OF NORMAL PREGNANCY IN THIRD TRIMESTER: Status: RESOLVED | Noted: 2018-09-13 | Resolved: 2020-04-15

## 2020-04-15 PROBLEM — N93.9 ABNORMAL UTERINE BLEEDING (AUB): Status: RESOLVED | Noted: 2019-12-27 | Resolved: 2020-04-15

## 2020-04-15 PROBLEM — D50.9 IRON DEFICIENCY ANEMIA, UNSPECIFIED: Status: ACTIVE | Noted: 2020-04-15

## 2020-04-15 PROBLEM — M41.55 SCOLIOSIS OF THORACOLUMBAR REGION DUE TO DEGENERATIVE DISEASE OF SPINE IN ADULT: Status: ACTIVE | Noted: 2020-04-15

## 2020-04-15 PROBLEM — O14.13 SEVERE PREECLAMPSIA, THIRD TRIMESTER: Status: RESOLVED | Noted: 2019-03-17 | Resolved: 2020-04-15

## 2021-03-17 ENCOUNTER — CLINICAL SUPPORT (OUTPATIENT)
Dept: URGENT CARE | Facility: CLINIC | Age: 36
End: 2021-03-17
Payer: COMMERCIAL

## 2021-03-17 DIAGNOSIS — Z11.59 SCREENING FOR VIRAL DISEASE: Primary | ICD-10-CM

## 2021-03-17 LAB
CTP QC/QA: YES
SARS-COV-2 RDRP RESP QL NAA+PROBE: NEGATIVE

## 2021-03-17 PROCEDURE — U0002 COVID-19 LAB TEST NON-CDC: HCPCS | Mod: QW,S$GLB,, | Performed by: NURSE PRACTITIONER

## 2021-03-17 PROCEDURE — U0002: ICD-10-PCS | Mod: QW,S$GLB,, | Performed by: NURSE PRACTITIONER

## 2021-04-21 ENCOUNTER — IMMUNIZATION (OUTPATIENT)
Dept: FAMILY MEDICINE | Facility: CLINIC | Age: 36
End: 2021-04-21
Payer: COMMERCIAL

## 2021-04-21 DIAGNOSIS — Z23 NEED FOR VACCINATION: Primary | ICD-10-CM

## 2021-04-21 PROCEDURE — 91300 COVID-19, MRNA, LNP-S, PF, 30 MCG/0.3 ML DOSE VACCINE: CPT | Mod: PBBFAC | Performed by: FAMILY MEDICINE

## 2021-07-20 ENCOUNTER — OFFICE VISIT (OUTPATIENT)
Dept: URGENT CARE | Facility: CLINIC | Age: 36
End: 2021-07-20
Payer: COMMERCIAL

## 2021-07-20 VITALS
HEIGHT: 63 IN | TEMPERATURE: 97 F | HEART RATE: 97 BPM | OXYGEN SATURATION: 97 % | BODY MASS INDEX: 49.61 KG/M2 | DIASTOLIC BLOOD PRESSURE: 86 MMHG | RESPIRATION RATE: 16 BRPM | WEIGHT: 280 LBS | SYSTOLIC BLOOD PRESSURE: 124 MMHG

## 2021-07-20 DIAGNOSIS — J02.9 PHARYNGITIS, UNSPECIFIED ETIOLOGY: ICD-10-CM

## 2021-07-20 DIAGNOSIS — Z20.822 SUSPECTED COVID-19 VIRUS INFECTION: Primary | ICD-10-CM

## 2021-07-20 DIAGNOSIS — Z20.822 CLOSE EXPOSURE TO COVID-19 VIRUS: ICD-10-CM

## 2021-07-20 DIAGNOSIS — Z11.59 SCREENING FOR VIRAL DISEASE: ICD-10-CM

## 2021-07-20 DIAGNOSIS — J04.0 LARYNGITIS: ICD-10-CM

## 2021-07-20 LAB
CTP QC/QA: YES
SARS-COV-2 RDRP RESP QL NAA+PROBE: NEGATIVE

## 2021-07-20 PROCEDURE — U0002: ICD-10-PCS | Mod: QW,S$GLB,, | Performed by: NURSE PRACTITIONER

## 2021-07-20 PROCEDURE — 3008F PR BODY MASS INDEX (BMI) DOCUMENTED: ICD-10-PCS | Mod: CPTII,S$GLB,, | Performed by: NURSE PRACTITIONER

## 2021-07-20 PROCEDURE — 3008F BODY MASS INDEX DOCD: CPT | Mod: CPTII,S$GLB,, | Performed by: NURSE PRACTITIONER

## 2021-07-20 PROCEDURE — 99203 PR OFFICE/OUTPT VISIT, NEW, LEVL III, 30-44 MIN: ICD-10-PCS | Mod: S$GLB,,, | Performed by: NURSE PRACTITIONER

## 2021-07-20 PROCEDURE — 99203 OFFICE O/P NEW LOW 30 MIN: CPT | Mod: S$GLB,,, | Performed by: NURSE PRACTITIONER

## 2021-07-20 PROCEDURE — U0002 COVID-19 LAB TEST NON-CDC: HCPCS | Mod: QW,S$GLB,, | Performed by: NURSE PRACTITIONER

## 2021-07-20 RX ORDER — AZITHROMYCIN 250 MG/1
TABLET, FILM COATED ORAL
Qty: 6 TABLET | Refills: 0 | Status: SHIPPED | OUTPATIENT
Start: 2021-07-20 | End: 2021-07-25

## 2021-07-20 RX ORDER — PREDNISONE 20 MG/1
20 TABLET ORAL DAILY
Qty: 4 TABLET | Refills: 0 | Status: SHIPPED | OUTPATIENT
Start: 2021-07-20 | End: 2021-07-24

## 2021-07-25 ENCOUNTER — NURSE TRIAGE (OUTPATIENT)
Dept: ADMINISTRATIVE | Facility: CLINIC | Age: 36
End: 2021-07-25

## 2021-07-29 ENCOUNTER — NURSE TRIAGE (OUTPATIENT)
Dept: ADMINISTRATIVE | Facility: CLINIC | Age: 36
End: 2021-07-29

## 2021-07-29 ENCOUNTER — OFFICE VISIT (OUTPATIENT)
Dept: URGENT CARE | Facility: CLINIC | Age: 36
End: 2021-07-29
Payer: COMMERCIAL

## 2021-07-29 VITALS
WEIGHT: 265 LBS | SYSTOLIC BLOOD PRESSURE: 122 MMHG | HEART RATE: 95 BPM | RESPIRATION RATE: 18 BRPM | OXYGEN SATURATION: 97 % | HEIGHT: 63 IN | DIASTOLIC BLOOD PRESSURE: 74 MMHG | TEMPERATURE: 99 F | BODY MASS INDEX: 46.95 KG/M2

## 2021-07-29 DIAGNOSIS — U07.1 COVID-19 VIRUS INFECTION: Primary | ICD-10-CM

## 2021-07-29 PROCEDURE — 1159F PR MEDICATION LIST DOCUMENTED IN MEDICAL RECORD: ICD-10-PCS | Mod: CPTII,S$GLB,, | Performed by: PHYSICIAN ASSISTANT

## 2021-07-29 PROCEDURE — 1160F RVW MEDS BY RX/DR IN RCRD: CPT | Mod: CPTII,S$GLB,, | Performed by: PHYSICIAN ASSISTANT

## 2021-07-29 PROCEDURE — 3074F PR MOST RECENT SYSTOLIC BLOOD PRESSURE < 130 MM HG: ICD-10-PCS | Mod: CPTII,S$GLB,, | Performed by: PHYSICIAN ASSISTANT

## 2021-07-29 PROCEDURE — 3078F PR MOST RECENT DIASTOLIC BLOOD PRESSURE < 80 MM HG: ICD-10-PCS | Mod: CPTII,S$GLB,, | Performed by: PHYSICIAN ASSISTANT

## 2021-07-29 PROCEDURE — 3008F BODY MASS INDEX DOCD: CPT | Mod: CPTII,S$GLB,, | Performed by: PHYSICIAN ASSISTANT

## 2021-07-29 PROCEDURE — 1159F MED LIST DOCD IN RCRD: CPT | Mod: CPTII,S$GLB,, | Performed by: PHYSICIAN ASSISTANT

## 2021-07-29 PROCEDURE — 3074F SYST BP LT 130 MM HG: CPT | Mod: CPTII,S$GLB,, | Performed by: PHYSICIAN ASSISTANT

## 2021-07-29 PROCEDURE — 99214 PR OFFICE/OUTPT VISIT, EST, LEVL IV, 30-39 MIN: ICD-10-PCS | Mod: S$GLB,,, | Performed by: PHYSICIAN ASSISTANT

## 2021-07-29 PROCEDURE — 1160F PR REVIEW ALL MEDS BY PRESCRIBER/CLIN PHARMACIST DOCUMENTED: ICD-10-PCS | Mod: CPTII,S$GLB,, | Performed by: PHYSICIAN ASSISTANT

## 2021-07-29 PROCEDURE — 3078F DIAST BP <80 MM HG: CPT | Mod: CPTII,S$GLB,, | Performed by: PHYSICIAN ASSISTANT

## 2021-07-29 PROCEDURE — 3008F PR BODY MASS INDEX (BMI) DOCUMENTED: ICD-10-PCS | Mod: CPTII,S$GLB,, | Performed by: PHYSICIAN ASSISTANT

## 2021-07-29 PROCEDURE — 99214 OFFICE O/P EST MOD 30 MIN: CPT | Mod: S$GLB,,, | Performed by: PHYSICIAN ASSISTANT

## 2021-07-29 RX ORDER — PREDNISONE 20 MG/1
20 TABLET ORAL DAILY
Qty: 5 TABLET | Refills: 0 | Status: CANCELLED | OUTPATIENT
Start: 2021-07-29 | End: 2021-08-03

## 2021-07-29 RX ORDER — CERTOLIZUMAB PEGOL 200 MG/ML
400 INJECTION, SOLUTION SUBCUTANEOUS
COMMUNITY
End: 2022-05-06

## 2021-07-29 RX ORDER — AZITHROMYCIN 250 MG/1
250 TABLET, FILM COATED ORAL SEE ADMIN INSTRUCTIONS
Qty: 6 TABLET | Refills: 0 | Status: CANCELLED | OUTPATIENT
Start: 2021-07-29

## 2021-07-29 RX ORDER — GUAIFENESIN 600 MG/1
1200 TABLET, EXTENDED RELEASE ORAL 2 TIMES DAILY
Qty: 40 TABLET | Refills: 0 | Status: CANCELLED | OUTPATIENT
Start: 2021-07-29 | End: 2021-08-08

## 2021-07-29 RX ORDER — PROMETHAZINE HYDROCHLORIDE AND DEXTROMETHORPHAN HYDROBROMIDE 6.25; 15 MG/5ML; MG/5ML
5 SYRUP ORAL EVERY 4 HOURS PRN
Qty: 180 ML | Refills: 0 | Status: CANCELLED | OUTPATIENT
Start: 2021-07-29 | End: 2021-08-08

## 2021-07-29 RX ORDER — ALBUTEROL SULFATE 90 UG/1
2 AEROSOL, METERED RESPIRATORY (INHALATION) EVERY 6 HOURS PRN
Qty: 18 G | Refills: 0 | Status: CANCELLED | OUTPATIENT
Start: 2021-07-29 | End: 2022-07-29

## 2022-05-06 ENCOUNTER — OFFICE VISIT (OUTPATIENT)
Dept: OBSTETRICS AND GYNECOLOGY | Facility: CLINIC | Age: 37
End: 2022-05-06
Payer: COMMERCIAL

## 2022-05-06 VITALS
BODY MASS INDEX: 41.98 KG/M2 | DIASTOLIC BLOOD PRESSURE: 68 MMHG | HEART RATE: 72 BPM | SYSTOLIC BLOOD PRESSURE: 122 MMHG | WEIGHT: 237 LBS

## 2022-05-06 DIAGNOSIS — L90.0 LICHEN SCLEROSUS: Primary | ICD-10-CM

## 2022-05-06 PROCEDURE — 1159F MED LIST DOCD IN RCRD: CPT | Mod: CPTII,S$GLB,, | Performed by: OBSTETRICS & GYNECOLOGY

## 2022-05-06 PROCEDURE — 99213 OFFICE O/P EST LOW 20 MIN: CPT | Mod: S$GLB,,, | Performed by: OBSTETRICS & GYNECOLOGY

## 2022-05-06 PROCEDURE — 1159F PR MEDICATION LIST DOCUMENTED IN MEDICAL RECORD: ICD-10-PCS | Mod: CPTII,S$GLB,, | Performed by: OBSTETRICS & GYNECOLOGY

## 2022-05-06 PROCEDURE — 99999 PR PBB SHADOW E&M-EST. PATIENT-LVL III: CPT | Mod: PBBFAC,,, | Performed by: OBSTETRICS & GYNECOLOGY

## 2022-05-06 PROCEDURE — 99213 PR OFFICE/OUTPT VISIT, EST, LEVL III, 20-29 MIN: ICD-10-PCS | Mod: S$GLB,,, | Performed by: OBSTETRICS & GYNECOLOGY

## 2022-05-06 PROCEDURE — 3074F PR MOST RECENT SYSTOLIC BLOOD PRESSURE < 130 MM HG: ICD-10-PCS | Mod: CPTII,S$GLB,, | Performed by: OBSTETRICS & GYNECOLOGY

## 2022-05-06 PROCEDURE — 3008F PR BODY MASS INDEX (BMI) DOCUMENTED: ICD-10-PCS | Mod: CPTII,S$GLB,, | Performed by: OBSTETRICS & GYNECOLOGY

## 2022-05-06 PROCEDURE — 3078F PR MOST RECENT DIASTOLIC BLOOD PRESSURE < 80 MM HG: ICD-10-PCS | Mod: CPTII,S$GLB,, | Performed by: OBSTETRICS & GYNECOLOGY

## 2022-05-06 PROCEDURE — 3078F DIAST BP <80 MM HG: CPT | Mod: CPTII,S$GLB,, | Performed by: OBSTETRICS & GYNECOLOGY

## 2022-05-06 PROCEDURE — 99999 PR PBB SHADOW E&M-EST. PATIENT-LVL III: ICD-10-PCS | Mod: PBBFAC,,, | Performed by: OBSTETRICS & GYNECOLOGY

## 2022-05-06 PROCEDURE — 1160F PR REVIEW ALL MEDS BY PRESCRIBER/CLIN PHARMACIST DOCUMENTED: ICD-10-PCS | Mod: CPTII,S$GLB,, | Performed by: OBSTETRICS & GYNECOLOGY

## 2022-05-06 PROCEDURE — 3074F SYST BP LT 130 MM HG: CPT | Mod: CPTII,S$GLB,, | Performed by: OBSTETRICS & GYNECOLOGY

## 2022-05-06 PROCEDURE — 3008F BODY MASS INDEX DOCD: CPT | Mod: CPTII,S$GLB,, | Performed by: OBSTETRICS & GYNECOLOGY

## 2022-05-06 PROCEDURE — 1160F RVW MEDS BY RX/DR IN RCRD: CPT | Mod: CPTII,S$GLB,, | Performed by: OBSTETRICS & GYNECOLOGY

## 2022-05-06 RX ORDER — LEVOTHYROXINE SODIUM 88 UG/1
88 TABLET ORAL
COMMUNITY
End: 2022-10-27 | Stop reason: SDUPTHER

## 2022-05-06 RX ORDER — TRIAMCINOLONE ACETONIDE 1 MG/G
CREAM TOPICAL 2 TIMES DAILY
Qty: 80 G | Refills: 2 | Status: SHIPPED | OUTPATIENT
Start: 2022-05-06

## 2022-05-06 NOTE — PROGRESS NOTES
Subjective:       Patient ID: Deneen Borrego is a 36 y.o. female.    Chief Complaint:  vaginal rash (Hx Lichen Scleriois)      History of Present Illness  Patient presents stating that she has had a flare-up of lichen sclerosis.  Patient states the pleura began about 2-3 days ago.  Patient been on a steroid cream in the past however during the pandemic she had run out and has not been treating.  Patient requested a refill on medication.    Menstrual History:  OB History        3    Para   3    Term   3       0    AB   0    Living   3       SAB   0    IAB   0    Ectopic   0    Multiple        Live Births   3                Menarche age:  Patient's last menstrual period was 2019.         Review of Systems  Review of Systems   Constitutional: Negative for activity change, appetite change, chills, diaphoresis, fatigue, fever and unexpected weight change.   HENT: Negative for congestion, dental problem, drooling, ear discharge, ear pain, facial swelling, hearing loss, mouth sores, nosebleeds, postnasal drip, rhinorrhea, sinus pressure, sneezing, sore throat, tinnitus, trouble swallowing and voice change.    Eyes: Negative for photophobia, pain, discharge, redness, itching and visual disturbance.   Respiratory: Negative for apnea, cough, choking, chest tightness, shortness of breath, wheezing and stridor.    Cardiovascular: Negative for chest pain, palpitations and leg swelling.   Gastrointestinal: Negative for abdominal distention, abdominal pain, anal bleeding, blood in stool, constipation, diarrhea, nausea, rectal pain and vomiting.   Endocrine: Negative for cold intolerance, heat intolerance, polydipsia, polyphagia and polyuria.   Genitourinary: Positive for dyspareunia, vaginal bleeding, vaginal discharge and vaginal pain. Negative for decreased urine volume, difficulty urinating, dysuria, enuresis, flank pain, frequency, genital sores, hematuria, menstrual problem, pelvic pain and urgency.    Musculoskeletal: Negative for arthralgias, back pain, gait problem, joint swelling, myalgias, neck pain and neck stiffness.   Skin: Negative for color change, pallor, rash and wound.   Allergic/Immunologic: Negative for environmental allergies, food allergies and immunocompromised state.   Neurological: Negative for dizziness, tremors, seizures, syncope, facial asymmetry, speech difficulty, weakness, light-headedness, numbness and headaches.   Hematological: Negative for adenopathy. Does not bruise/bleed easily.   Psychiatric/Behavioral: Negative for agitation, behavioral problems, confusion, decreased concentration, dysphoric mood, hallucinations, self-injury, sleep disturbance and suicidal ideas. The patient is not nervous/anxious and is not hyperactive.            Objective:      Physical Exam  Vitals and nursing note reviewed. Exam conducted with a chaperone present.   Genitourinary:                Assessment:        1. Lichen sclerosus                Plan:         Deneen was seen today for vaginal rash.    Diagnoses and all orders for this visit:    Lichen sclerosus    Other orders  -     triamcinolone acetonide 0.1% (KENALOG) 0.1 % cream; Apply topically 2 (two) times daily.

## 2022-09-20 NOTE — TELEPHONE ENCOUNTER
----- Message from Vicki Hua sent at 3/20/2019  4:32 PM CDT -----  Contact: Self  Deneen Borrego  MRN: [id number]  Home Phone      [phone]  Work Phone      Not on file.  Mobile          [phone]    Patient Care Team:  Go Corrigan MD as PCP - General (Family Medicine)  Josiane Mills MD (Obstetrics and Gynecology)  OB? No  What phone number can you be reached at? [phone]  Message:   Pt states she is out of work until next Thursday 3/28 and needs a note for her employer. She received a letter today, but it was only excusing today. Pt will  letter at office when available. Please advise.  
35 wk ob states she needs a letter excusing her out of work until 3/28/19. States she received a letter today, but only to excuse her from today. Please advise.   
Letter written, pt notified the letter is at the reception desk.   
You may supply patient with letter. Please put in letter patient is on bedrest for remainder of pregnancy secondary to high risk pregnancy state and elevated blood pressures.   
none

## 2022-09-30 ENCOUNTER — CLINICAL SUPPORT (OUTPATIENT)
Dept: OTHER | Facility: CLINIC | Age: 37
End: 2022-09-30
Payer: COMMERCIAL

## 2022-09-30 DIAGNOSIS — Z00.8 ENCOUNTER FOR OTHER GENERAL EXAMINATION: ICD-10-CM

## 2022-10-24 LAB
HDLC SERPL-MCNC: 50 MG/DL
POC CHOLESTEROL, TOTAL: 175 MG/DL
POC GLUCOSE, FASTING: 83 MG/DL (ref 60–110)
TRIGL SERPL-MCNC: 44 MG/DL

## 2022-10-27 ENCOUNTER — TELEPHONE (OUTPATIENT)
Dept: FAMILY MEDICINE | Facility: CLINIC | Age: 37
End: 2022-10-27
Payer: COMMERCIAL

## 2022-10-27 DIAGNOSIS — Z13.1 DIABETES MELLITUS SCREENING: ICD-10-CM

## 2022-10-27 DIAGNOSIS — Z11.59 ENCOUNTER FOR HEPATITIS C SCREENING TEST FOR LOW RISK PATIENT: ICD-10-CM

## 2022-10-27 DIAGNOSIS — Z13.220 SCREENING CHOLESTEROL LEVEL: ICD-10-CM

## 2022-10-27 DIAGNOSIS — Z13.0 SCREENING FOR DEFICIENCY ANEMIA: ICD-10-CM

## 2022-10-27 DIAGNOSIS — Z00.00 ENCOUNTER FOR BLOOD TEST FOR ROUTINE GENERAL PHYSICAL EXAMINATION: Primary | ICD-10-CM

## 2022-10-27 DIAGNOSIS — E89.0 POSTOPERATIVE HYPOTHYROIDISM: ICD-10-CM

## 2022-10-27 RX ORDER — LEVOTHYROXINE SODIUM 88 UG/1
88 TABLET ORAL
Qty: 30 TABLET | Refills: 0 | Status: SHIPPED | OUTPATIENT
Start: 2022-10-27 | End: 2022-11-01 | Stop reason: SDUPTHER

## 2022-10-27 NOTE — TELEPHONE ENCOUNTER
Patient said she will like to establish care with you she recently moved back home from [place] to [place] said her main concern for coming in because she need refill on her thyroid medication said half of her thyroid was removed and her previous Dr will not refill her medication. Please advised.

## 2022-10-27 NOTE — TELEPHONE ENCOUNTER
Schedule NEW patient fasting labs and WELLNESS EXAM - I already spoke with patient and she is awaiting your call.    I filled her thyroid med for 1 month so she would not run out.

## 2022-10-27 NOTE — TELEPHONE ENCOUNTER
----- Message from Rossi Lomeli, Patient Care Assistant sent at 10/27/2022  9:52 AM CDT -----  Type:  Needs Medical Advice    Who Called:  pt  Symptoms (please be specific):  Patient would like a call back to schedule a new patient appt  to est care   Would the patient rather a call back or a response via [facility]?  Call   Best Call Back Number:  [phone]  Additional Information:

## 2022-10-29 VITALS
HEIGHT: 63 IN | WEIGHT: 237 LBS | DIASTOLIC BLOOD PRESSURE: 82 MMHG | SYSTOLIC BLOOD PRESSURE: 130 MMHG | BODY MASS INDEX: 41.99 KG/M2

## 2022-11-01 ENCOUNTER — OFFICE VISIT (OUTPATIENT)
Dept: FAMILY MEDICINE | Facility: CLINIC | Age: 37
End: 2022-11-01
Payer: COMMERCIAL

## 2022-11-01 VITALS
DIASTOLIC BLOOD PRESSURE: 78 MMHG | SYSTOLIC BLOOD PRESSURE: 110 MMHG | BODY MASS INDEX: 44.99 KG/M2 | TEMPERATURE: 98 F | HEIGHT: 62 IN | WEIGHT: 244.5 LBS | HEART RATE: 71 BPM | OXYGEN SATURATION: 98 %

## 2022-11-01 DIAGNOSIS — Z86.69 HX OF MIGRAINE HEADACHES: ICD-10-CM

## 2022-11-01 DIAGNOSIS — E66.01 CLASS 3 SEVERE OBESITY DUE TO EXCESS CALORIES WITHOUT SERIOUS COMORBIDITY WITH BODY MASS INDEX (BMI) OF 40.0 TO 44.9 IN ADULT: ICD-10-CM

## 2022-11-01 DIAGNOSIS — E89.0 POSTOPERATIVE HYPOTHYROIDISM: ICD-10-CM

## 2022-11-01 DIAGNOSIS — E89.0 S/P PARTIAL THYROIDECTOMY: ICD-10-CM

## 2022-11-01 DIAGNOSIS — F41.1 GENERALIZED ANXIETY DISORDER: ICD-10-CM

## 2022-11-01 DIAGNOSIS — F33.0 MILD EPISODE OF RECURRENT MAJOR DEPRESSIVE DISORDER: ICD-10-CM

## 2022-11-01 DIAGNOSIS — Z00.00 ANNUAL PHYSICAL EXAM: Primary | ICD-10-CM

## 2022-11-01 DIAGNOSIS — L90.0 LICHEN SCLEROSUS ET ATROPHICUS: ICD-10-CM

## 2022-11-01 DIAGNOSIS — L40.9 PSORIASIS: ICD-10-CM

## 2022-11-01 DIAGNOSIS — M79.7 FIBROMYALGIA: ICD-10-CM

## 2022-11-01 PROBLEM — M54.30 SCIATIC NERVE PAIN: Status: RESOLVED | Noted: 2019-01-02 | Resolved: 2022-11-01

## 2022-11-01 PROBLEM — E66.813 CLASS 3 SEVERE OBESITY DUE TO EXCESS CALORIES WITHOUT SERIOUS COMORBIDITY WITH BODY MASS INDEX (BMI) OF 40.0 TO 44.9 IN ADULT: Status: ACTIVE | Noted: 2022-11-01

## 2022-11-01 PROBLEM — D50.9 IRON DEFICIENCY ANEMIA, UNSPECIFIED: Status: RESOLVED | Noted: 2020-04-15 | Resolved: 2022-11-01

## 2022-11-01 PROCEDURE — 3074F SYST BP LT 130 MM HG: CPT | Mod: CPTII,S$GLB,, | Performed by: NURSE PRACTITIONER

## 2022-11-01 PROCEDURE — 3074F PR MOST RECENT SYSTOLIC BLOOD PRESSURE < 130 MM HG: ICD-10-PCS | Mod: CPTII,S$GLB,, | Performed by: NURSE PRACTITIONER

## 2022-11-01 PROCEDURE — 3078F PR MOST RECENT DIASTOLIC BLOOD PRESSURE < 80 MM HG: ICD-10-PCS | Mod: CPTII,S$GLB,, | Performed by: NURSE PRACTITIONER

## 2022-11-01 PROCEDURE — 3008F BODY MASS INDEX DOCD: CPT | Mod: CPTII,S$GLB,, | Performed by: NURSE PRACTITIONER

## 2022-11-01 PROCEDURE — 1160F RVW MEDS BY RX/DR IN RCRD: CPT | Mod: CPTII,S$GLB,, | Performed by: NURSE PRACTITIONER

## 2022-11-01 PROCEDURE — 99999 PR PBB SHADOW E&M-EST. PATIENT-LVL IV: ICD-10-PCS | Mod: PBBFAC,,, | Performed by: NURSE PRACTITIONER

## 2022-11-01 PROCEDURE — 3044F PR MOST RECENT HEMOGLOBIN A1C LEVEL <7.0%: ICD-10-PCS | Mod: CPTII,S$GLB,, | Performed by: NURSE PRACTITIONER

## 2022-11-01 PROCEDURE — 3008F PR BODY MASS INDEX (BMI) DOCUMENTED: ICD-10-PCS | Mod: CPTII,S$GLB,, | Performed by: NURSE PRACTITIONER

## 2022-11-01 PROCEDURE — 99999 PR PBB SHADOW E&M-EST. PATIENT-LVL IV: CPT | Mod: PBBFAC,,, | Performed by: NURSE PRACTITIONER

## 2022-11-01 PROCEDURE — 1159F MED LIST DOCD IN RCRD: CPT | Mod: CPTII,S$GLB,, | Performed by: NURSE PRACTITIONER

## 2022-11-01 PROCEDURE — 3044F HG A1C LEVEL LT 7.0%: CPT | Mod: CPTII,S$GLB,, | Performed by: NURSE PRACTITIONER

## 2022-11-01 PROCEDURE — 99395 PREV VISIT EST AGE 18-39: CPT | Mod: S$GLB,,, | Performed by: NURSE PRACTITIONER

## 2022-11-01 PROCEDURE — 99395 PR PREVENTIVE VISIT,EST,18-39: ICD-10-PCS | Mod: S$GLB,,, | Performed by: NURSE PRACTITIONER

## 2022-11-01 PROCEDURE — 1159F PR MEDICATION LIST DOCUMENTED IN MEDICAL RECORD: ICD-10-PCS | Mod: CPTII,S$GLB,, | Performed by: NURSE PRACTITIONER

## 2022-11-01 PROCEDURE — 1160F PR REVIEW ALL MEDS BY PRESCRIBER/CLIN PHARMACIST DOCUMENTED: ICD-10-PCS | Mod: CPTII,S$GLB,, | Performed by: NURSE PRACTITIONER

## 2022-11-01 PROCEDURE — 3078F DIAST BP <80 MM HG: CPT | Mod: CPTII,S$GLB,, | Performed by: NURSE PRACTITIONER

## 2022-11-01 RX ORDER — BUPROPION HYDROCHLORIDE 75 MG/1
75 TABLET ORAL EVERY MORNING
COMMUNITY
Start: 2022-06-15 | End: 2022-11-01 | Stop reason: DRUGHIGH

## 2022-11-01 RX ORDER — BUPROPION HYDROCHLORIDE 100 MG/1
100 TABLET ORAL DAILY PRN
COMMUNITY
Start: 2022-10-17 | End: 2023-06-23

## 2022-11-01 RX ORDER — LAMOTRIGINE 100 MG/1
100 TABLET ORAL DAILY
COMMUNITY
Start: 2022-09-28 | End: 2023-12-05 | Stop reason: SDUPTHER

## 2022-11-01 RX ORDER — LIDOCAINE HYDROCHLORIDE 20 MG/ML
JELLY TOPICAL 3 TIMES DAILY
COMMUNITY
Start: 2022-05-16 | End: 2022-11-01

## 2022-11-01 RX ORDER — LEVOTHYROXINE SODIUM 100 UG/1
100 TABLET ORAL
Qty: 90 TABLET | Refills: 0 | Status: SHIPPED | OUTPATIENT
Start: 2022-11-01 | End: 2023-04-11 | Stop reason: SDUPTHER

## 2022-11-01 NOTE — PROGRESS NOTES
Subjective:       Patient ID: Deneen Borrego is a 36 y.o. female.    Chief Complaint: Annual Exam and Establish Care    HPI    ###NEW PATIENT TO ME###    Patient is a 36 year old white female with Recurrent Depression and Anxiety treated by psychiatry, post-surgical hypothyroidism s/p partial right thyroidectomy, Fibromyalgia diagnosed by Internal Medicine NP 4/2020 (no rheumatology evaluation), Lichen Sclerosus/psoriasis followed by GYN MD, obesity and history of migraines that is here today to establish care with new provider and to get ANNUAL physical exam with fasting lab results.    Recurrent Depression and Anxiety   treated by psychiatry [facility].  Currently taking Wellbutrin 100 mg daily and Lamictal 100 mg daily  Would like referral to establish care with [facility] for medicine management.    Post-surgical hypothyroidism s/p partial right thyroidectomy  Right thyroidectomy due to nodule that was benign  Currently taking Levothyroxine 88 mcg daily - taking on empty stomach in AM before any other food/drink or med.  TSH high at 5.87 with Free T4 0.84  Will increase dose to 100 mcg daily and recheck in 3 months.    Fibromyalgia   diagnosed by Internal Medicine NP 4/2020 (no rheumatology evaluation)  Not on any medications.  Working on anti-inflammatory diet.    Lichen Sclerosus/psoriasis   followed by GYN MD    Obesity  Body mass index is 44.72 kg/m².    Wellness Labs:  CBC WNL  CMP WNL  Cholesterol WNL  Thyroid discussed above  HgbA1C 4.9%  Hepatitis C screening negative    Health Maintenance:  Declined covid booster    Component      Latest Ref Rng & Units 10/31/2022 4/20/2020 4/15/2020 12/20/2019   WBC      3.90 - 12.70 K/uL 6.71 8.80  7.79   RBC      4.00 - 5.40 M/uL 4.50 4.87  5.04   Hemoglobin      12.0 - 16.0 g/dL 12.9 14.1  14.2   Hematocrit      37.0 - 48.5 % 39.8 41.8  44.7   MCV      82 - 98 fL 88 86  89   MCH      27.0 - 31.0 pg 28.7 28.9  28.2   MCHC       32.0 - 36.0 g/dL 32.4 33.7  31.8 (L)   RDW      11.5 - 14.5 % 12.9 13.5  14.7 (H)   Platelets      150 - 450 K/uL 250 282  277   MPV      9.2 - 12.9 fL 11.4 9.5  11.4   Immature Granulocytes      0.0 - 0.5 % 0.3   0.3   Gran # (ANC)      1.8 - 7.7 K/uL 4.2 5.1  6.6   Immature Grans (Abs)      0.00 - 0.04 K/uL 0.02   0.02   Lymph #      1.0 - 4.8 K/uL 1.9 2.9  1.0   Mono #      0.3 - 1.0 K/uL 0.4 0.5  0.2 (L)   Eos #      0.0 - 0.5 K/uL 0.2 0.2  0.0   Baso #      0.00 - 0.20 K/uL 0.09 0.10  0.05   nRBC      0 /100 WBC 0 0  0   Gran %      38.0 - 73.0 % 62.0 57.9  84.1 (H)   Lymph %      18.0 - 48.0 % 27.6 32.9  12.8 (L)   Mono %      4.0 - 15.0 % 6.0 5.7  2.1 (L)   Eosinophil %      0.0 - 8.0 % 2.8 2.2  0.1   Basophil %      0.0 - 1.9 % 1.3 1.3  0.6   Differential Method       Automated Automated  Automated   Sodium      136 - 145 mmol/L 141 140  141   Potassium      3.5 - 5.1 mmol/L 4.2 4.7  4.5   Chloride      95 - 110 mmol/L 104 103  107   CO2      23 - 29 mmol/L 30 (H) 28  23   Glucose      70 - 110 mg/dL 105 103  125 (H)   BUN      7 - 17 mg/dL 12 15  10   Creatinine      0.50 - 1.40 mg/dL 0.71 0.80  0.8   Calcium      8.7 - 10.5 mg/dL 8.5 (L) 9.4  9.7   PROTEIN TOTAL      6.0 - 8.4 g/dL 7.4 7.9  8.1   Albumin      3.5 - 5.2 g/dL 4.0 4.7  4.2   BILIRUBIN TOTAL      0.1 - 1.0 mg/dL 0.5 0.6  0.4   Alkaline Phosphatase      38 - 126 U/L 84 89  101   AST      15 - 46 U/L 17 20  13   ALT      10 - 44 U/L 13 19  23   Anion Gap      8 - 16 mmol/L 7 (L)   11   eGFR      >60 mL/min/1.73 m:2 >60.0      Cholesterol      120 - 199 mg/dL 152 210 (H)     Triglycerides      30 - 150 mg/dL 58 86     HDL      40 - 75 mg/dL 43 42     LDL Cholesterol External      63.0 - 159.0 mg/dL 97.4 151 (H)     HDL/Cholesterol Ratio      20.0 - 50.0 % 28.3 20.0     Total Cholesterol/HDL Ratio      2.0 - 5.0 3.5 5.0     Non-HDL Cholesterol      mg/dL 109 168     Hemoglobin A1C External      4.0 - 5.6 % 4.9  5.2    Estimated Avg Glucose      68  "- 131 mg/dL 94      TSH      0.400 - 4.000 uIU/mL 5.870 (H) 7.62 (H)  2.830   Free T4      0.71 - 1.51 ng/dL 0.84      Hepatitis C Ab      Non-reactive Non-reactive          Review of Systems   Constitutional:  Negative for appetite change, chills, fatigue, fever and unexpected weight change.   HENT:  Negative for congestion, ear pain, mouth sores, nosebleeds, postnasal drip, rhinorrhea, sinus pressure, sneezing, sore throat, trouble swallowing and voice change.    Eyes:  Negative for photophobia, pain, discharge, redness, itching and visual disturbance.   Respiratory:  Negative for cough, chest tightness and shortness of breath.    Cardiovascular:  Negative for chest pain, palpitations and leg swelling.   Gastrointestinal:  Negative for abdominal pain, blood in stool, constipation, diarrhea, nausea and vomiting.   Genitourinary:  Negative for dysuria, frequency, hematuria and urgency.   Musculoskeletal:  Negative for arthralgias, back pain, joint swelling and myalgias.   Skin:  Negative for color change and rash.   Allergic/Immunologic: Negative for immunocompromised state.   Neurological:  Negative for dizziness, seizures, syncope, weakness and headaches.   Hematological:  Negative for adenopathy. Does not bruise/bleed easily.   Psychiatric/Behavioral:  Negative for agitation, dysphoric mood, sleep disturbance and suicidal ideas. The patient is not nervous/anxious.        Objective:     Vitals:    11/01/22 1401   BP: 110/78   BP Location: Right arm   Patient Position: Sitting   BP Method: Large (Manual)   Pulse: 71   Temp: 97.9 °F (36.6 °C)   TempSrc: Temporal   SpO2: 98%   Weight: 110.9 kg (244 lb 7.8 oz)   Height: 5' 2" (1.575 m)          Physical Exam  Constitutional:       General: She is not in acute distress.     Appearance: Normal appearance. She is obese. She is not ill-appearing, toxic-appearing or diaphoretic.      Comments: Body mass index is 44.72 kg/m².     HENT:      Head: Normocephalic and " atraumatic.      Right Ear: Tympanic membrane, ear canal and external ear normal. There is no impacted cerumen.      Left Ear: Tympanic membrane, ear canal and external ear normal. There is no impacted cerumen.      Nose: No congestion or rhinorrhea.      Mouth/Throat:      Pharynx: No oropharyngeal exudate.   Eyes:      General:         Right eye: No discharge.         Left eye: No discharge.      Extraocular Movements: Extraocular movements intact.      Conjunctiva/sclera: Conjunctivae normal.   Cardiovascular:      Rate and Rhythm: Normal rate and regular rhythm.      Heart sounds: Normal heart sounds. No murmur heard.  Pulmonary:      Effort: Pulmonary effort is normal. No respiratory distress.      Breath sounds: Normal breath sounds. No stridor. No wheezing, rhonchi or rales.   Abdominal:      General: Bowel sounds are normal. There is no distension.      Palpations: Abdomen is soft.      Tenderness: There is no abdominal tenderness. There is no guarding.   Musculoskeletal:         General: Normal range of motion.      Cervical back: Normal range of motion.      Right lower leg: No edema.      Left lower leg: No edema.   Lymphadenopathy:      Cervical: No cervical adenopathy.   Skin:     General: Skin is warm and dry.      Findings: No rash.   Neurological:      Mental Status: She is alert and oriented to person, place, and time.   Psychiatric:         Mood and Affect: Mood normal.         Behavior: Behavior normal.         Thought Content: Thought content normal.         Judgment: Judgment normal.         Assessment:         ICD-10-CM ICD-9-CM   1. Annual physical exam  Z00.00 V70.0   2. Mild episode of recurrent major depressive disorder  F33.0 296.31   3. Generalized anxiety disorder  F41.1 300.02   4. Postoperative hypothyroidism  E89.0 244.0   5. S/P partial thyroidectomy  E89.0 246.8   6. Class 3 severe obesity due to excess calories without serious comorbidity with body mass index (BMI) of 40.0 to 44.9  in adult  E66.01 278.01    Z68.41 V85.41   7. Fibromyalgia  M79.7 729.1   8. Lichen sclerosus et atrophicus  L90.0 701.0   9. Psoriasis  L40.9 696.1   10. Hx of migraine headaches  Z86.69 V12.49       Plan:       Annual physical exam  Health Maintenance Summary     Full History      Expand All  Collapse All    Postponed - COVID-19 Vaccine  (3 - Booster for Pfizer series)  Postponed until 11/1/2023 08/26/2021  Imm Admin: COVID-19, MRNA, LN-S, PF (Pfizer) (Purple Cap)    04/21/2021  Imm Admin: COVID-19, MRNA, LN-S, PF (Pfizer) (Purple Cap)      TETANUS VACCINE  (Every 10 Years)  Next due on 1/16/2029 01/16/2019  Imm Admin: Tdap      HIV Screening  Completed  08/16/2018  HIV-1 and HIV-2 antibodies      Influenza Vaccine  (Series Information)  Completed  09/30/2022  Done - completed at work    10/11/2018  Imm Admin: Influenza - Quadrivalent - PF *Preferred* (6 months and older)      Hepatitis C Screening  Completed  10/31/2022  Hepatitis C Ab component of Hepatitis C Antibody      Lipid Panel  Completed  10/31/2022  Lipid Panel    09/30/2022  POCT Lipid Profile w/ Glucose    02/09/2022  Outside Claim: CHG LIPID PANEL    04/20/2020  Lipid panel      Pneumococcal Vaccines (Age 0-64)  (Series Information)  Aged Out  No completion history exists for this topic.       Mild episode of recurrent major depressive disorder  Condition followed/treated by Specialist.  Please follow up with Specialist as advised.    -     Ambulatory referral/consult to Psychiatry; Future; Expected date: 11/08/2022    Generalized anxiety disorder  Condition followed/treated by Specialist.  Please follow up with Specialist as advised.    -     Ambulatory referral/consult to Psychiatry; Future; Expected date: 11/08/2022    Postoperative hypothyroidism  INCREASE dose to 100 mcg daily and recheck in 3 months.  -     levothyroxine (SYNTHROID) 100 MCG tablet; Take 1 tablet (100 mcg total) by mouth before breakfast.  Dispense: 90 tablet; Refill: 0  -      TSH; Future; Expected date: 11/01/2022  -     T4, Free; Future; Expected date: 11/01/2022    S/P partial thyroidectomy    Class 3 severe obesity due to excess calories without serious comorbidity with body mass index (BMI) of 40.0 to 44.9 in adult  Continue to work on diet.    Fibromyalgia    Lichen sclerosus et atrophicus  Condition followed/treated by Specialist.  Please follow up with Specialist as advised.    Psoriasis  Condition followed/treated by Specialist.  Please follow up with Specialist as advised.    Hx of migraine headaches    Follow up in about 3 months (around 2/1/2023) for thyroid labs and follow up.     Patient's Medications   New Prescriptions    No medications on file   Previous Medications    BUPROPION (WELLBUTRIN) 100 MG TABLET    Take 100 mg by mouth daily as needed.    LAMOTRIGINE (LAMICTAL) 100 MG TABLET    Take 100 mg by mouth once daily.    TRIAMCINOLONE ACETONIDE 0.1% (KENALOG) 0.1 % CREAM    Apply topically 2 (two) times daily.   Modified Medications    Modified Medication Previous Medication    LEVOTHYROXINE (SYNTHROID) 100 MCG TABLET levothyroxine (SYNTHROID) 88 MCG tablet       Take 1 tablet (100 mcg total) by mouth before breakfast.    Take 1 tablet (88 mcg total) by mouth before breakfast.   Discontinued Medications    BUPROPION (WELLBUTRIN) 75 MG TABLET    Take 75 mg by mouth every morning.    BUPROPION HCL (WELLBUTRIN ORAL)    Take 1 tablet by mouth once daily at 6am.    LIDOCAINE HCL 2% (XYLOCAINE) 2 % JELLY    Apply topically 3 (three) times daily.       Past Medical History:   Diagnosis Date    Acute right lumbar radiculopathy 01/02/2019    Cervicalgia of [id number]-[id number] region     right worse than left; right handed    COVID-19 07/29/2021    Depression, major, recurrent     [facility] [place], first  closet encinas, suicidal    Episode of recurrent major depressive disorder 04/15/2020    Fibrocystic breast changes     Fibromyalgia     Generalized anxiety disorder  04/15/2020    Hx of migraine headaches     Hypothyroid     TPO negative    Hypothyroidism, adult     TPO negative    LUCY (iron deficiency anemia)     Iron deficiency anemia, unspecified 04/15/2020    Lichen sclerosus et atrophicus     Morbid obesity 04/15/2020    Obesity, Class III, BMI 40-49.9 (morbid obesity)     max weight 291    Prediabetes     Psoriasis 04/15/2020    Psoriasis     Scoliosis of thoracolumbar region due to degenerative disease of spine in adult 04/15/2020    Severe anxiety with panic        Past Surgical History:   Procedure Laterality Date     SECTION N/A 2019    Procedure: REPEAT  SECTION;  Surgeon: Miko Albert MD;  Location: [facility];  Service: OB/GYN;  Laterality: N/A;     SECTION  2008    [place]    LAPAROSCOPIC CHOLECYSTECTOMY      [place]    LAPAROSCOPIC SALPINGECTOMY Bilateral 2019    Procedure: SALPINGECTOMY, LAPAROSCOPIC;  Surgeon: Josiane Mills MD;  Location: [facility];  Service: OB/GYN;  Laterality: Bilateral;    LAPAROSCOPIC TOTAL HYSTERECTOMY N/A 2019    Procedure: HYSTERECTOMY, TOTAL, LAPAROSCOPIC;  Surgeon: Josiane Mills MD;  Location: [facility];  Service: OB/GYN;  Laterality: N/A;    PARTIAL HYSTERECTOMY  2019    Shelly, fibroid, heavy bleeding - still has ovaries    THYROIDECTOMY, PARTIAL Right     Sadiq, solid right nodule - benign on biopsy when removed    WISDOM TOOTH EXTRACTION         Family History   Problem Relation Age of Onset    Diabetes type II Mother     Diabetes Mother     Hyperlipidemia Father     Heart disease Father 73        AFIB    Hypertension Father     Colon polyps Father     Colon cancer Father     Cancer Father 60        colon cancer; stomach cancer    Hypertension Paternal Aunt     Diabetes Paternal Aunt     Cancer Paternal Aunt     Cancer Paternal Uncle     Diabetes Paternal Uncle     Hypertension Paternal Uncle     Breast cancer Maternal Grandmother     Dementia Maternal  Grandmother     Asthma Maternal Grandmother     Vision loss Maternal Grandmother     Arrhythmia Maternal Grandfather     Cancer Maternal Grandfather     Ovarian cancer Neg Hx     Cardiomyopathy Neg Hx     Congenital heart disease Neg Hx     Early death Neg Hx     Heart attacks under age 50 Neg Hx     Pacemaker/defibrilator Neg Hx        Social History     Socioeconomic History    Marital status:      Spouse name: Go osullivan    Number of children: 3    Highest education level: Bachelor's degree (e.g., BA, AB, BS)   Occupational History    Occupation: Lead Advocate supervisor     Comment: [employer]    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

## 2023-01-09 ENCOUNTER — OFFICE VISIT (OUTPATIENT)
Dept: FAMILY MEDICINE | Facility: CLINIC | Age: 38
End: 2023-01-09
Payer: COMMERCIAL

## 2023-01-09 VITALS
WEIGHT: 243.81 LBS | SYSTOLIC BLOOD PRESSURE: 122 MMHG | HEART RATE: 74 BPM | OXYGEN SATURATION: 98 % | BODY MASS INDEX: 44.87 KG/M2 | DIASTOLIC BLOOD PRESSURE: 84 MMHG | HEIGHT: 62 IN | TEMPERATURE: 98 F

## 2023-01-09 DIAGNOSIS — J02.9 PHARYNGITIS, UNSPECIFIED ETIOLOGY: Primary | ICD-10-CM

## 2023-01-09 LAB
CTP QC/QA: YES
CTP QC/QA: YES
MOLECULAR STREP A: NEGATIVE
POC MOLECULAR INFLUENZA A AGN: NEGATIVE
POC MOLECULAR INFLUENZA B AGN: NEGATIVE

## 2023-01-09 PROCEDURE — 3079F PR MOST RECENT DIASTOLIC BLOOD PRESSURE 80-89 MM HG: ICD-10-PCS | Mod: CPTII,S$GLB,, | Performed by: PEDIATRICS

## 2023-01-09 PROCEDURE — 87651 POCT STREP A MOLECULAR: ICD-10-PCS | Mod: QW,S$GLB,, | Performed by: PEDIATRICS

## 2023-01-09 PROCEDURE — 3074F SYST BP LT 130 MM HG: CPT | Mod: CPTII,S$GLB,, | Performed by: PEDIATRICS

## 2023-01-09 PROCEDURE — 1160F RVW MEDS BY RX/DR IN RCRD: CPT | Mod: CPTII,S$GLB,, | Performed by: PEDIATRICS

## 2023-01-09 PROCEDURE — 87651 STREP A DNA AMP PROBE: CPT | Mod: QW,S$GLB,, | Performed by: PEDIATRICS

## 2023-01-09 PROCEDURE — 99214 OFFICE O/P EST MOD 30 MIN: CPT | Mod: S$GLB,,, | Performed by: PEDIATRICS

## 2023-01-09 PROCEDURE — 99999 PR PBB SHADOW E&M-EST. PATIENT-LVL IV: CPT | Mod: PBBFAC,,, | Performed by: PEDIATRICS

## 2023-01-09 PROCEDURE — 3074F PR MOST RECENT SYSTOLIC BLOOD PRESSURE < 130 MM HG: ICD-10-PCS | Mod: CPTII,S$GLB,, | Performed by: PEDIATRICS

## 2023-01-09 PROCEDURE — 1159F MED LIST DOCD IN RCRD: CPT | Mod: CPTII,S$GLB,, | Performed by: PEDIATRICS

## 2023-01-09 PROCEDURE — 87502 INFLUENZA DNA AMP PROBE: CPT | Mod: QW,S$GLB,, | Performed by: PEDIATRICS

## 2023-01-09 PROCEDURE — 3079F DIAST BP 80-89 MM HG: CPT | Mod: CPTII,S$GLB,, | Performed by: PEDIATRICS

## 2023-01-09 PROCEDURE — 1159F PR MEDICATION LIST DOCUMENTED IN MEDICAL RECORD: ICD-10-PCS | Mod: CPTII,S$GLB,, | Performed by: PEDIATRICS

## 2023-01-09 PROCEDURE — 3008F BODY MASS INDEX DOCD: CPT | Mod: CPTII,S$GLB,, | Performed by: PEDIATRICS

## 2023-01-09 PROCEDURE — 3008F PR BODY MASS INDEX (BMI) DOCUMENTED: ICD-10-PCS | Mod: CPTII,S$GLB,, | Performed by: PEDIATRICS

## 2023-01-09 PROCEDURE — 99999 PR PBB SHADOW E&M-EST. PATIENT-LVL IV: ICD-10-PCS | Mod: PBBFAC,,, | Performed by: PEDIATRICS

## 2023-01-09 PROCEDURE — 99214 PR OFFICE/OUTPT VISIT, EST, LEVL IV, 30-39 MIN: ICD-10-PCS | Mod: S$GLB,,, | Performed by: PEDIATRICS

## 2023-01-09 PROCEDURE — 1160F PR REVIEW ALL MEDS BY PRESCRIBER/CLIN PHARMACIST DOCUMENTED: ICD-10-PCS | Mod: CPTII,S$GLB,, | Performed by: PEDIATRICS

## 2023-01-09 PROCEDURE — 87502 POCT INFLUENZA A/B MOLECULAR: ICD-10-PCS | Mod: QW,S$GLB,, | Performed by: PEDIATRICS

## 2023-01-09 RX ORDER — IBUPROFEN 800 MG/1
800 TABLET ORAL 3 TIMES DAILY
Qty: 30 TABLET | Refills: 0 | Status: SHIPPED | OUTPATIENT
Start: 2023-01-09 | End: 2023-01-19

## 2023-01-09 NOTE — PROGRESS NOTES
Subjective:      Patient ID: Deneen Borrego is a 37 y.o. female.    Chief Complaint: Sore Throat (Sore throat, fever ,chills,body aches, started Saturday night. Pt took at home covid was negative )    Reports sore throat, fever (did not take temp), body aches, chills since Saturday night. States every now and then she coughs, but it is to clear throat and not a true cough. No post nasal drip. No NVD, shortness of breath. Has been taking dauquill and tylenol pretty much around the clock. Reports this is not really helping much.  was sick about a week ago, did not go to doctor and is feeling better now. Has done two covid tests at home, most recently last night which was negative.    Sore Throat   Associated symptoms include ear pain. Pertinent negatives include no congestion, coughing, diarrhea, headaches, shortness of breath or vomiting.   Review of Systems   Constitutional:  Positive for chills, fatigue and fever.   HENT:  Positive for ear pain and sore throat. Negative for congestion, postnasal drip, rhinorrhea, sinus pressure and sinus pain.    Respiratory:  Negative for cough, chest tightness, shortness of breath and wheezing.    Gastrointestinal:  Negative for diarrhea, nausea and vomiting.   Genitourinary:  Negative for difficulty urinating.   Musculoskeletal:  Negative for arthralgias.   Skin:  Negative for rash.   Neurological:  Negative for dizziness and headaches.   Psychiatric/Behavioral:  Negative for confusion.       Current Outpatient Medications on File Prior to Visit   Medication Sig    buPROPion (WELLBUTRIN) 100 MG tablet Take 100 mg by mouth daily as needed.    lamoTRIgine (LAMICTAL) 100 MG tablet Take 100 mg by mouth once daily.    levothyroxine (SYNTHROID) 100 MCG tablet Take 1 tablet (100 mcg total) by mouth before breakfast.    triamcinolone acetonide 0.1% (KENALOG) 0.1 % cream Apply topically 2 (two) times daily.     No current facility-administered medications on file prior to  visit.        Objective:     Vitals:    01/09/23 0806   BP: 122/84   Pulse: 74   Temp: 98.1 °F (36.7 °C)      Physical Exam  Constitutional:       General: She is not in acute distress.     Appearance: Normal appearance.   HENT:      Head: Normocephalic and atraumatic.      Right Ear: Ear canal and external ear normal. A middle ear effusion is present. Tympanic membrane is bulging.      Left Ear: Ear canal and external ear normal. A middle ear effusion is present. Tympanic membrane is bulging.      Nose: Nose normal.      Mouth/Throat:      Mouth: Mucous membranes are moist.      Pharynx: Oropharyngeal exudate and posterior oropharyngeal erythema present.   Eyes:      Extraocular Movements: Extraocular movements intact.      Conjunctiva/sclera: Conjunctivae normal.      Pupils: Pupils are equal, round, and reactive to light.   Cardiovascular:      Rate and Rhythm: Normal rate and regular rhythm.      Pulses: Normal pulses.      Heart sounds: Normal heart sounds.   Pulmonary:      Effort: Pulmonary effort is normal. No respiratory distress.      Breath sounds: Normal breath sounds. No wheezing.   Abdominal:      General: Abdomen is flat. Bowel sounds are normal.   Musculoskeletal:         General: No swelling. Normal range of motion.      Cervical back: Normal range of motion and neck supple.   Lymphadenopathy:      Cervical: Cervical adenopathy present.      Right cervical: Superficial cervical adenopathy present.      Left cervical: Superficial cervical adenopathy present.   Skin:     General: Skin is warm and dry.      Findings: No rash.   Neurological:      Mental Status: She is alert and oriented to person, place, and time.      Gait: Gait normal.   Psychiatric:         Mood and Affect: Mood normal.         Behavior: Behavior normal.      Assessment:         1. Pharyngitis, unspecified etiology          Plan:   1. Pharyngitis, unspecified etiology  - POCT Influenza A/B Molecular  - POCT Strep A, Molecular  -  ibuprofen (ADVIL,MOTRIN) 800 MG tablet; Take 1 tablet (800 mg total) by mouth 3 (three) times daily. for 10 days  Dispense: 30 tablet; Refill: 0  - (Magic mouthwash) 1:1:1 diphenhydrAMINE(Benadryl) 12.5mg/5ml liq, aluminum & magnesium hydroxide-simethicone (Maalox), LIDOcaine viscous 2%; Swish and spit 10 mLs every 4 (four) hours as needed (PAIN). for mouth sores  Dispense: 180 mL; Refill: 0     -Use strict handwashing  -Use OTC cough/cold treatments, check multisymptom products for double dosing  -Drink cool/warm liquids and use lozenges to soothe throat  -Follow up as needed or if symptoms worsen    Selvin Waters, DAMARIS   [facility]  1/9/23

## 2023-01-09 NOTE — LETTER
January 9, 2023    Deneen Borrego  [address] [place] [address]             [facility]  [address] [place], [place] [address] [address]  Phone: [phone]  Fax: [phone]   January 9, 2023     Patient: Deneen Borrego   YOB: 1985   Date of Visit: 1/9/2023       To Whom it May Concern:    Deneen Borrego was seen in my clinic on 1/9/2023. She may return to work on 01/11/2023 IF 24 HOURS FEVER FREE..    Please excuse her from any classes or work missed.    If you have any questions or concerns, please don't hesitate to call.    Sincerely,         Selvin Waters, NP

## 2023-06-22 ENCOUNTER — TELEPHONE (OUTPATIENT)
Dept: FAMILY MEDICINE | Facility: CLINIC | Age: 38
End: 2023-06-22
Payer: COMMERCIAL

## 2023-06-22 NOTE — TELEPHONE ENCOUNTER
----- Message from Rebeca Avina sent at 6/22/2023  4:17 PM CDT -----  Type:  Patient Returning Call    Who Called: Pt  Who Left Message for Patient: Hanna  Does the patient know what this is regarding?: Lab appt  Would the patient rather a call back or a response via [facility]?  call  Best Call Back Number: [phone]  Additional Information:

## 2023-06-22 NOTE — TELEPHONE ENCOUNTER
----- Message from Rebeca Avina sent at 6/22/2023  3:53 PM CDT -----  Caller is requesting to schedule their Lab appointment prior to annual appointment.  Order is not listed in EPIC.  Please enter order and contact patient to schedule.    Name of Caller: Pt    Preferred Date and Time of Labs: 6/22/23    Date of EPP Appointment: 6/28/23    Where would they like the lab performed? [facility]    Would the patient rather a call back or a response via [facility]?  call    Best Call Back Number: [phone]    Additional Information:

## 2023-06-23 ENCOUNTER — OFFICE VISIT (OUTPATIENT)
Dept: OBSTETRICS AND GYNECOLOGY | Facility: CLINIC | Age: 38
End: 2023-06-23
Payer: COMMERCIAL

## 2023-06-23 VITALS
SYSTOLIC BLOOD PRESSURE: 118 MMHG | RESPIRATION RATE: 18 BRPM | HEART RATE: 67 BPM | OXYGEN SATURATION: 99 % | BODY MASS INDEX: 43.08 KG/M2 | DIASTOLIC BLOOD PRESSURE: 82 MMHG | HEIGHT: 62 IN | WEIGHT: 234.13 LBS

## 2023-06-23 DIAGNOSIS — N89.8 VAGINAL DRYNESS: ICD-10-CM

## 2023-06-23 DIAGNOSIS — L90.0 LICHEN SCLEROSUS: ICD-10-CM

## 2023-06-23 DIAGNOSIS — Z12.31 ENCOUNTER FOR SCREENING MAMMOGRAM FOR MALIGNANT NEOPLASM OF BREAST: ICD-10-CM

## 2023-06-23 DIAGNOSIS — Z90.710 HISTORY OF HYSTERECTOMY: ICD-10-CM

## 2023-06-23 DIAGNOSIS — Z01.419 WELL WOMAN EXAM WITH ROUTINE GYNECOLOGICAL EXAM: Primary | ICD-10-CM

## 2023-06-23 DIAGNOSIS — R68.82 DECREASED SEX DRIVE: ICD-10-CM

## 2023-06-23 PROCEDURE — 3008F BODY MASS INDEX DOCD: CPT | Mod: CPTII,S$GLB,, | Performed by: OBSTETRICS & GYNECOLOGY

## 2023-06-23 PROCEDURE — 3008F PR BODY MASS INDEX (BMI) DOCUMENTED: ICD-10-PCS | Mod: CPTII,S$GLB,, | Performed by: OBSTETRICS & GYNECOLOGY

## 2023-06-23 PROCEDURE — 3079F PR MOST RECENT DIASTOLIC BLOOD PRESSURE 80-89 MM HG: ICD-10-PCS | Mod: CPTII,S$GLB,, | Performed by: OBSTETRICS & GYNECOLOGY

## 2023-06-23 PROCEDURE — 1159F PR MEDICATION LIST DOCUMENTED IN MEDICAL RECORD: ICD-10-PCS | Mod: CPTII,S$GLB,, | Performed by: OBSTETRICS & GYNECOLOGY

## 2023-06-23 PROCEDURE — 1159F MED LIST DOCD IN RCRD: CPT | Mod: CPTII,S$GLB,, | Performed by: OBSTETRICS & GYNECOLOGY

## 2023-06-23 PROCEDURE — 3079F DIAST BP 80-89 MM HG: CPT | Mod: CPTII,S$GLB,, | Performed by: OBSTETRICS & GYNECOLOGY

## 2023-06-23 PROCEDURE — 99999 PR PBB SHADOW E&M-EST. PATIENT-LVL III: ICD-10-PCS | Mod: PBBFAC,,, | Performed by: OBSTETRICS & GYNECOLOGY

## 2023-06-23 PROCEDURE — 3074F PR MOST RECENT SYSTOLIC BLOOD PRESSURE < 130 MM HG: ICD-10-PCS | Mod: CPTII,S$GLB,, | Performed by: OBSTETRICS & GYNECOLOGY

## 2023-06-23 PROCEDURE — 99999 PR PBB SHADOW E&M-EST. PATIENT-LVL III: CPT | Mod: PBBFAC,,, | Performed by: OBSTETRICS & GYNECOLOGY

## 2023-06-23 PROCEDURE — 3074F SYST BP LT 130 MM HG: CPT | Mod: CPTII,S$GLB,, | Performed by: OBSTETRICS & GYNECOLOGY

## 2023-06-23 PROCEDURE — 1160F PR REVIEW ALL MEDS BY PRESCRIBER/CLIN PHARMACIST DOCUMENTED: ICD-10-PCS | Mod: CPTII,S$GLB,, | Performed by: OBSTETRICS & GYNECOLOGY

## 2023-06-23 PROCEDURE — 1160F RVW MEDS BY RX/DR IN RCRD: CPT | Mod: CPTII,S$GLB,, | Performed by: OBSTETRICS & GYNECOLOGY

## 2023-06-23 PROCEDURE — 99395 PREV VISIT EST AGE 18-39: CPT | Mod: S$GLB,,, | Performed by: OBSTETRICS & GYNECOLOGY

## 2023-06-23 PROCEDURE — 99395 PR PREVENTIVE VISIT,EST,18-39: ICD-10-PCS | Mod: S$GLB,,, | Performed by: OBSTETRICS & GYNECOLOGY

## 2023-06-23 RX ORDER — BUPROPION HYDROCHLORIDE 150 MG/1
150 TABLET ORAL EVERY MORNING
COMMUNITY
Start: 2023-06-15 | End: 2023-12-05 | Stop reason: SDUPTHER

## 2023-06-23 RX ORDER — ESTRADIOL 0.1 MG/G
1 CREAM VAGINAL DAILY
Qty: 42.5 G | Refills: 8 | Status: SHIPPED | OUTPATIENT
Start: 2023-06-23 | End: 2024-06-22

## 2023-06-23 RX ORDER — CLOBETASOL PROPIONATE 0.5 MG/G
OINTMENT TOPICAL 2 TIMES DAILY
Qty: 30 G | Refills: 2 | Status: SHIPPED | OUTPATIENT
Start: 2023-06-23

## 2023-06-23 RX ORDER — ESTRADIOL 0.5 MG/1
0.5 TABLET ORAL DAILY
Qty: 30 TABLET | Refills: 3 | Status: SHIPPED | OUTPATIENT
Start: 2023-06-23 | End: 2023-12-05

## 2023-06-23 NOTE — PROGRESS NOTES
Subjective:    Patient ID: Deneen Borrego is a 37 y.o. y.o. female.     Chief Complaint: Annual Well Woman Exam     History of Present Illness:  Deneen presents today for Annual Well Woman exam. She describes her menses as  absent s/p hysterectomy .She denies pelvic pain.  She denies breast tenderness, masses, nipple discharge. She denies difficulty with urination or bowel movements. She admits to menopausal symptoms such as hotflashes, vaginal dryness, and night sweats. She denies bloating, early satiety, or weight changes. She is sexually active. Contraception is by no method.    Patient reports vasomotory symptoms and vaginal dryness. She is having decreased sex drive and pain with intercourse. Lichen sclerosus is also flaring up.     A full discussion of the benefit-risk ratio of hormonal replacement therapy was carried out. Improvement in vasomotor and other climacteric symptoms were discussed, including possible improvements in sleep and mood. Reduction of risk for osteoporosis was explained. We discussed the study data showing increased risk of thrombo-embolic events such as myocardial infarction, stroke and also possibly breast cancer with estrogen replacement, and how this might affect her. The range of side effects such as breast tenderness, weight gain and including possible increases in lifetime risk of breast cancer and possible thrombotic complications was discussed. We also discussed ACOG's recommendation to use hormone replacement therapy for the relief of hot flashes alone and to be on the lowest dose possible for the shortest amount of time.  Alternative such as herbal and soy-based products were reviewed as well as behavioral modifications and non hormonal prescription medications. All of her questions about this therapy were answered.      The following portions of the patient's history were reviewed and updated as appropriate: allergies, current medications, past family history, past  medical history, past social history, past surgical history and problem list.      Menstrual History:   Patient's last menstrual period was 2019..     OB History          3    Para   3    Term   3       0    AB   0    Living   3         SAB   0    IAB   0    Ectopic   0    Multiple        Live Births   3                 The following portions of the patient's history were reviewed and updated as appropriate: allergies, current medications, past family history, past medical history, past social history, past surgical history, and problem list.        ROS:     Review of Systems   Constitutional:  Negative for activity change, appetite change, chills, diaphoresis, fatigue, fever and unexpected weight change.   HENT:  Negative for mouth sores and tinnitus.    Eyes:  Negative for discharge and visual disturbance.   Respiratory:  Negative for cough, shortness of breath and wheezing.    Cardiovascular:  Negative for chest pain, palpitations and leg swelling.   Gastrointestinal:  Negative for abdominal pain, bloating, blood in stool, constipation, diarrhea, nausea and vomiting.   Endocrine: Positive for hot flashes. Negative for diabetes, hair loss, hyperthyroidism and hypothyroidism.   Genitourinary:  Positive for decreased libido, dyspareunia and vaginal dryness. Negative for dysuria, flank pain, frequency, genital sores, hematuria, urgency, vaginal bleeding, vaginal discharge, vaginal pain, postcoital bleeding and vaginal odor.   Musculoskeletal:  Negative for back pain, joint swelling and myalgias.   Integumentary:  Negative for rash, acne, breast mass, nipple discharge and breast skin changes.   Neurological:  Negative for seizures, syncope, numbness and headaches.   Hematological:  Negative for adenopathy. Does not bruise/bleed easily.   Psychiatric/Behavioral:  Positive for sleep disturbance. Negative for depression. The patient is not nervous/anxious.    Breast: Negative for mass, mastodynia,  "nipple discharge and skin changes    Objective:    Vital Signs:  Vitals:    06/23/23 1030   BP: 118/82   Pulse: 67   Resp: 18   SpO2: 99%   Weight: 106.2 kg (234 lb 1.6 oz)   Height: 5' 2" (1.575 m)         Physical Exam:  General:  alert, cooperative, appears stated age   Skin:  Skin color, texture, turgor normal. No rashes or lesions   HEENT:  conjunctivae/corneas clear. PERRL.   Neck: supple, trachea midline, no adenopathy or thyromegally   Respiratory:  clear to auscultation bilaterally   Heart:  regular rate and rhythm, S1, S2 normal, no murmur, click, rub or gallop   Breasts:  no discharge, erythema, or tenderness   Abdomen:  normal findings: bowel sounds normal, no masses palpable, no organomegaly and soft, non-tender   Pelvis: External genitalia: normal general appearance, hypopigmentation at the clitoral harden and vaginal introitus  Urinary system: urethral meatus normal, bladder nontender  Vaginal: normal mucosa without prolapse or lesions  Cervix: absent, removed surgically  Uterus: absent, removed surgically  Adnexa: non palpable   Extremities: Normal ROM; no edema, no cyanosis   Neurologial: Normal strength and tone. No focal numbness or weakness. Reflexes 2+ and equal.   Psychiatric: normal mood, speech, dress, and thought processes         Assessment:       Healthy female exam.     1. Well woman exam with routine gynecological exam    2. Encounter for screening mammogram for malignant neoplasm of breast    3. Lichen sclerosus    4. History of hysterectomy    5. Vaginal dryness    6. Decreased sex drive          Plan:       Pap smear deferred per guidelines; no longer indicated  MMG ordered  Rx of estradiol  Rx of estrace cream  Rx of clobetasol     COUNSELING:  Deneen was counseled on STD pevention, use and side-effects of various contraceptive measures, A.C.O.G. Pap guidelines and recommendations for yearly pelvic exams in addition to recommendations for monthly self breast exams; to see her PCP for " other health maintenance.

## 2023-07-24 DIAGNOSIS — E89.0 POSTOPERATIVE HYPOTHYROIDISM: ICD-10-CM

## 2023-07-24 RX ORDER — LEVOTHYROXINE SODIUM 100 UG/1
100 TABLET ORAL
Qty: 30 TABLET | Refills: 0 | Status: SHIPPED | OUTPATIENT
Start: 2023-07-24 | End: 2023-10-23 | Stop reason: SDUPTHER

## 2023-08-03 ENCOUNTER — OFFICE VISIT (OUTPATIENT)
Dept: FAMILY MEDICINE | Facility: CLINIC | Age: 38
End: 2023-08-03
Payer: COMMERCIAL

## 2023-08-03 VITALS
DIASTOLIC BLOOD PRESSURE: 68 MMHG | HEIGHT: 62 IN | SYSTOLIC BLOOD PRESSURE: 118 MMHG | WEIGHT: 231.06 LBS | OXYGEN SATURATION: 99 % | HEART RATE: 65 BPM | TEMPERATURE: 98 F | BODY MASS INDEX: 42.52 KG/M2

## 2023-08-03 DIAGNOSIS — Z00.00 ENCOUNTER FOR BLOOD TEST FOR ROUTINE GENERAL PHYSICAL EXAMINATION: ICD-10-CM

## 2023-08-03 DIAGNOSIS — F33.0 MILD EPISODE OF RECURRENT MAJOR DEPRESSIVE DISORDER: ICD-10-CM

## 2023-08-03 DIAGNOSIS — Z13.220 SCREENING CHOLESTEROL LEVEL: ICD-10-CM

## 2023-08-03 DIAGNOSIS — Z13.1 DIABETES MELLITUS SCREENING: ICD-10-CM

## 2023-08-03 DIAGNOSIS — F41.1 GENERALIZED ANXIETY DISORDER: ICD-10-CM

## 2023-08-03 DIAGNOSIS — E89.0 POSTOPERATIVE HYPOTHYROIDISM: Primary | ICD-10-CM

## 2023-08-03 DIAGNOSIS — M79.7 FIBROMYALGIA: ICD-10-CM

## 2023-08-03 DIAGNOSIS — Z13.0 SCREENING FOR DEFICIENCY ANEMIA: ICD-10-CM

## 2023-08-03 DIAGNOSIS — M75.42 IMPINGEMENT SYNDROME OF LEFT SHOULDER: ICD-10-CM

## 2023-08-03 DIAGNOSIS — E66.01 CLASS 3 SEVERE OBESITY DUE TO EXCESS CALORIES WITHOUT SERIOUS COMORBIDITY WITH BODY MASS INDEX (BMI) OF 40.0 TO 44.9 IN ADULT: ICD-10-CM

## 2023-08-03 DIAGNOSIS — E89.0 S/P PARTIAL THYROIDECTOMY: ICD-10-CM

## 2023-08-03 PROCEDURE — 1159F MED LIST DOCD IN RCRD: CPT | Mod: CPTII,S$GLB,, | Performed by: NURSE PRACTITIONER

## 2023-08-03 PROCEDURE — 3074F PR MOST RECENT SYSTOLIC BLOOD PRESSURE < 130 MM HG: ICD-10-PCS | Mod: CPTII,S$GLB,, | Performed by: NURSE PRACTITIONER

## 2023-08-03 PROCEDURE — 99214 OFFICE O/P EST MOD 30 MIN: CPT | Mod: S$GLB,,, | Performed by: NURSE PRACTITIONER

## 2023-08-03 PROCEDURE — 1159F PR MEDICATION LIST DOCUMENTED IN MEDICAL RECORD: ICD-10-PCS | Mod: CPTII,S$GLB,, | Performed by: NURSE PRACTITIONER

## 2023-08-03 PROCEDURE — 3078F DIAST BP <80 MM HG: CPT | Mod: CPTII,S$GLB,, | Performed by: NURSE PRACTITIONER

## 2023-08-03 PROCEDURE — 99999 PR PBB SHADOW E&M-EST. PATIENT-LVL V: ICD-10-PCS | Mod: PBBFAC,,, | Performed by: NURSE PRACTITIONER

## 2023-08-03 PROCEDURE — 99214 PR OFFICE/OUTPT VISIT, EST, LEVL IV, 30-39 MIN: ICD-10-PCS | Mod: S$GLB,,, | Performed by: NURSE PRACTITIONER

## 2023-08-03 PROCEDURE — 3078F PR MOST RECENT DIASTOLIC BLOOD PRESSURE < 80 MM HG: ICD-10-PCS | Mod: CPTII,S$GLB,, | Performed by: NURSE PRACTITIONER

## 2023-08-03 PROCEDURE — 1160F RVW MEDS BY RX/DR IN RCRD: CPT | Mod: CPTII,S$GLB,, | Performed by: NURSE PRACTITIONER

## 2023-08-03 PROCEDURE — 3074F SYST BP LT 130 MM HG: CPT | Mod: CPTII,S$GLB,, | Performed by: NURSE PRACTITIONER

## 2023-08-03 PROCEDURE — 1160F PR REVIEW ALL MEDS BY PRESCRIBER/CLIN PHARMACIST DOCUMENTED: ICD-10-PCS | Mod: CPTII,S$GLB,, | Performed by: NURSE PRACTITIONER

## 2023-08-03 PROCEDURE — 99999 PR PBB SHADOW E&M-EST. PATIENT-LVL V: CPT | Mod: PBBFAC,,, | Performed by: NURSE PRACTITIONER

## 2023-08-03 PROCEDURE — 3008F PR BODY MASS INDEX (BMI) DOCUMENTED: ICD-10-PCS | Mod: CPTII,S$GLB,, | Performed by: NURSE PRACTITIONER

## 2023-08-03 PROCEDURE — 3008F BODY MASS INDEX DOCD: CPT | Mod: CPTII,S$GLB,, | Performed by: NURSE PRACTITIONER

## 2023-08-03 RX ORDER — DICLOFENAC SODIUM 75 MG/1
75 TABLET, DELAYED RELEASE ORAL 2 TIMES DAILY
Qty: 60 TABLET | Refills: 0 | Status: SHIPPED | OUTPATIENT
Start: 2023-08-03 | End: 2023-12-05

## 2023-08-03 NOTE — PROGRESS NOTES
Subjective:       Patient ID: Deneen Borrego is a 37 y.o. female.    Chief Complaint: Results (F/U Thyroids Labs) and Shoulder Pain    HPI    Patient is a 37 year old white female with Recurrent Depression and Anxiety treated by psychiatry, post-surgical hypothyroidism s/p partial right thyroidectomy, Fibromyalgia diagnosed by Internal Medicine NP 4/2020 (no rheumatology evaluation), Lichen Sclerosus/psoriasis followed by GYN MD, obesity and history of migraines that is here today for 6 month follow up with lab results and complaint of left shoulder pain.     Recurrent Depression and Anxiety   treated by psychiatry [facility] management.  Currently taking Wellbutrin  mg daily and Lamictal 100 mg daily       Post-surgical hypothyroidism s/p partial right thyroidectomy  Right thyroidectomy due to nodule that was benign  Currently taking Levothyroxine 100mcg daily - taking on empty stomach in AM before any other food/drink or med.  TSH 1.16 with Free T4 0.88  Component      Latest Ref Rng & Units 6/23/2023 10/31/2022 4/20/2020   TSH      0.400 - 4.000 uIU/mL 1.160 5.870 (H) 7.62 (H)   Free T4      0.71 - 1.51 ng/dL 0.88 0.84         Fibromyalgia   diagnosed by Internal Medicine NP 4/2020 (no rheumatology evaluation)  Not on any medications.  Working on anti-inflammatory diet.     Lichen Sclerosus/psoriasis   followed by GYN MD     Obesity  Body mass index is 42.26 kg/m².    Left Shoulder Pain  Started around 10 days ago  No trauma/no injury  States pain to shoulder started during a panic attack but never resolved  No chest pain, no jaw pain  Pain to shoulder is a constant dull ache but increases with movement and worse at night.    Pain increases with abduction of the arm past shoulder high/above head reach      Review of Systems   Musculoskeletal:  Positive for arthralgias. Negative for joint swelling.        Left shoulder pain         Objective:     Vitals:    08/03/23 1309 08/03/23 1317  "  BP: 104/62 118/68   BP Location: Left arm    Patient Position: Sitting    BP Method: Large (Manual)    Pulse: 65    Temp: 98.2 °F (36.8 °C)    TempSrc: Temporal    SpO2: 99%    Weight: 104.8 kg (231 lb 0.7 oz)    Height: 5' 2" (1.575 m)           Physical Exam  Constitutional:       Comments: Body mass index is 42.26 kg/m².   HENT:      Head: Normocephalic and atraumatic.   Eyes:      Extraocular Movements: Extraocular movements intact.      Conjunctiva/sclera: Conjunctivae normal.   Cardiovascular:      Rate and Rhythm: Normal rate and regular rhythm.      Heart sounds: Normal heart sounds.   Pulmonary:      Effort: Pulmonary effort is normal. No respiratory distress.      Breath sounds: Normal breath sounds.   Abdominal:      General: There is no distension.   Musculoskeletal:      Left shoulder: No swelling or deformity. Normal range of motion.        Arms:       Comments: Pain with abduction   Skin:     General: Skin is warm and dry.   Neurological:      Mental Status: She is oriented to person, place, and time.   Psychiatric:         Mood and Affect: Mood normal.         Behavior: Behavior normal.         Thought Content: Thought content normal.         Judgment: Judgment normal.           Assessment:         ICD-10-CM ICD-9-CM   1. Postoperative hypothyroidism  E89.0 244.0   2. S/P partial thyroidectomy  E89.0 246.8   3. Mild episode of recurrent major depressive disorder  F33.0 296.31   4. Generalized anxiety disorder  F41.1 300.02   5. Class 3 severe obesity due to excess calories without serious comorbidity with body mass index (BMI) of 40.0 to 44.9 in adult  E66.01 278.01    Z68.41 V85.41   6. Impingement syndrome of left shoulder  M75.42 726.2   7. Fibromyalgia  M79.7 729.1   8. Encounter for blood test for routine general physical examination  Z00.00 V72.62   9. Screening for deficiency anemia  Z13.0 V78.1   10. Screening cholesterol level  Z13.220 V77.91   11. Diabetes mellitus screening  Z13.1 V77.1 "       Plan:       Postoperative hypothyroidism  Continue current medication(s).  Follow up in 4 months.  -     TSH; Future; Expected date: 08/03/2023  -     T4, Free; Future; Expected date: 08/03/2023    S/P partial thyroidectomy  -     TSH; Future; Expected date: 08/03/2023  -     T4, Free; Future; Expected date: 08/03/2023    Mild episode of recurrent major depressive disorder  Condition followed/treated by Specialist.  Please follow up with Specialist as advised.    Generalized anxiety disorder  Condition followed/treated by Specialist.  Please follow up with Specialist as advised.    Class 3 severe obesity due to excess calories without serious comorbidity with body mass index (BMI) of 40.0 to 44.9 in adult    Impingement syndrome of left shoulder  Take Diclofenac twice daily  See handout on exercises given  If no improvement in 3 to 4 weeks - will refer to orthopedics   -     diclofenac (VOLTAREN) 75 MG EC tablet; Take 1 tablet (75 mg total) by mouth 2 (two) times daily.  Dispense: 60 tablet; Refill: 0    Fibromyalgia    Encounter for blood test for routine general physical examination  -     CBC Auto Differential; Future; Expected date: 08/03/2023  -     Comprehensive Metabolic Panel; Future; Expected date: 08/03/2023  -     Hemoglobin A1C; Future; Expected date: 08/03/2023  -     Lipid Panel; Future; Expected date: 08/03/2023  -     TSH; Future; Expected date: 08/03/2023  -     T4, Free; Future; Expected date: 08/03/2023    Screening for deficiency anemia  -     CBC Auto Differential; Future; Expected date: 08/03/2023    Screening cholesterol level  -     Lipid Panel; Future; Expected date: 08/03/2023    Diabetes mellitus screening  -     Comprehensive Metabolic Panel; Future; Expected date: 08/03/2023  -     Hemoglobin A1C; Future; Expected date: 08/03/2023      Follow up in about 4 months (around 12/3/2023) for fasting labs and WELLNESS EXAM.     Patient's Medications   New Prescriptions    DICLOFENAC  (VOLTAREN) 75 MG EC TABLET    Take 1 tablet (75 mg total) by mouth 2 (two) times daily.   Previous Medications    BUPROPION (WELLBUTRIN XL) 150 MG TB24 TABLET    Take 150 mg by mouth every morning.    CLOBETASOL 0.05% (TEMOVATE) 0.05 % OINT    Apply topically 2 (two) times daily.    ESTRADIOL (ESTRACE) 0.01 % (0.1 MG/GRAM) VAGINAL CREAM    Place 1 g vaginally once daily.    ESTRADIOL (ESTRACE) 0.5 MG TABLET    Take 1 tablet (0.5 mg total) by mouth once daily.    LAMOTRIGINE (LAMICTAL) 100 MG TABLET    Take 100 mg by mouth once daily.    LEVOTHYROXINE (SYNTHROID) 100 MCG TABLET    Take 1 tablet (100 mcg total) by mouth before breakfast.    TRIAMCINOLONE ACETONIDE 0.1% (KENALOG) 0.1 % CREAM    Apply topically 2 (two) times daily.   Modified Medications    No medications on file   Discontinued Medications    No medications on file       Past Medical History:   Diagnosis Date    Acute right lumbar radiculopathy 2019    Cervicalgia of lrwsvscu-oenhgry-vsmoo region     right worse than left; right handed    COVID-19 2021    Depression, major, recurrent     [facility] [place], first  closet encinas, suicidal    Episode of recurrent major depressive disorder 04/15/2020    Fibrocystic breast changes     Fibromyalgia     Generalized anxiety disorder 04/15/2020    Hx of migraine headaches     Hypothyroidism, adult     TPO negative    LUCY (iron deficiency anemia)     Iron deficiency anemia, unspecified 04/15/2020    Lichen sclerosus et atrophicus     Morbid obesity 04/15/2020    Obesity, Class III, BMI 40-49.9 (morbid obesity)     max weight 291    Postoperative hypothyroidism 2022    Prediabetes     Psoriasis 04/15/2020    Psoriasis     Scoliosis of thoracolumbar region due to degenerative disease of spine in adult 04/15/2020    Severe anxiety with panic        Past Surgical History:   Procedure Laterality Date     SECTION N/A 2019    Procedure: REPEAT  SECTION;  Surgeon: Miko RADER  MD Roosevelt;  Location: [facility];  Service: OB/GYN;  Laterality: N/A;     SECTION  2008    [place]    LAPAROSCOPIC CHOLECYSTECTOMY      [place]    LAPAROSCOPIC SALPINGECTOMY Bilateral 2019    Procedure: SALPINGECTOMY, LAPAROSCOPIC;  Surgeon: Josiane Mills MD;  Location: [facility] OR;  Service: OB/GYN;  Laterality: Bilateral;    LAPAROSCOPIC TOTAL HYSTERECTOMY N/A 2019    Procedure: HYSTERECTOMY, TOTAL, LAPAROSCOPIC;  Surgeon: Josiane Mills MD;  Location: [facility] OR;  Service: OB/GYN;  Laterality: N/A;    PARTIAL HYSTERECTOMY  2019    Shelly, fibroid, heavy bleeding - still has ovaries    THYROIDECTOMY, PARTIAL Right 2015    Sadiq, solid right nodule - benign on biopsy when removed    WISDOM TOOTH EXTRACTION         Family History   Problem Relation Age of Onset    Diabetes type II Mother     Diabetes Mother     Hyperlipidemia Father     Heart disease Father 73        AFIB    Hypertension Father     Colon polyps Father     Colon cancer Father     Cancer Father 60        colon cancer; stomach cancer    Hypertension Paternal Aunt     Diabetes Paternal Aunt     Cancer Paternal Aunt     Cancer Paternal Uncle     Diabetes Paternal Uncle     Hypertension Paternal Uncle     Breast cancer Maternal Grandmother     Dementia Maternal Grandmother     Asthma Maternal Grandmother     Vision loss Maternal Grandmother     Arrhythmia Maternal Grandfather     Cancer Maternal Grandfather     Ovarian cancer Neg Hx     Cardiomyopathy Neg Hx     Congenital heart disease Neg Hx     Early death Neg Hx     Heart attacks under age 50 Neg Hx     Pacemaker/defibrilator Neg Hx        Social History     Socioeconomic History    Marital status:      Spouse name: Go osullivan    Number of children: 3    Highest education level: Bachelor's degree (e.g., BA, AB, BS)   Occupational History    Occupation: Lead Advocate supervisor     Comment: [employer]    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      Social Determinants of Health     Stress: Stress Concern Present (4/15/2020)    [employer] - Occupational Stress Questionnaire     Feeling of Stress : Rather much

## 2023-09-19 NOTE — L&D DELIVERY NOTE
[facility]   Section   Operative Note    SUMMARY     Date of Procedure: 3/26/2019     Procedure: Procedure(s) (LRB):   SECTION (N/A)    Surgeon(s) and Role:     * Miko Albert MD - Primary    Pre-Operative Diagnosis:   IUP @ 37 6/7 WGA  Severe Pre-eclampsia  History of  delivery [Z98.891]  AMA (advanced maternal age) multigravida 35+, second trimester [O09.522]  Obesity    Post-Operative Diagnosis: same    Anesthesia: Spinal    Complications: No    Blood Loss: 655 mL     DVT prophylaxis: Bilateral sequential compression devices    Perioperative antibiotics: Ancef    Operative Findings: viable male infant in the vertex presentation. Apgars 8 at 1 minute and 10 at 5 minutes. Amniotic fluid clear.  Uterus with 4 cm posterior subserosal fibroid, normal bilateral tubes, and ovaries.      OPERATIVE NOTE:  The patient was taken to the operating room were spinal anesthesia was found to be adequate. She was prepped and draped in the normal sterile fashion in the dorsal supine position. A craven catheter was in place draining clear urine. A pfannenstiel skin incision was then made with the knife and carried through to the underlying layer of fascia with the bovie. The fascia was incised in the midline. The fascial incision was extended laterally with the curved arroyo scissors. The superior aspect of the incision was grasped with two kocher clamps and elevated up. The superior aspect of the fascial incision was sharply dissected off. The same technique was used to take down the inferior aspect of the fascia. The muscle was  in the midline and the peritoneum was identified. The peritoneum was elevated up with hemastats and entered sharply. The peritoneal incision was then extended superiorly and inferiorly with good visualization of the surrounding tissue. The bladder blade was inserted and the vesicouterine peritoneum was identified and a bladder flap was made. A low  transverse incision was then made on the uterus and the incision was bluntly extended. The infant's head was grasped and brought to the incision. The assistant then applied gentle fundal pressure and the infant was delivered without difficulty. The nose and mouth was suctioned and the cord was clamped and cut. The infant was then handed off to the awaiting nursery personnel. Cord blood was obtained for the infant's blood type. The placenta was removed, the uterus was exteriorized and cleaned of all clot and debris. The uterine incision was repaired with #1 PDS in a running locked fashion. A second layer was used for imbrication. A 2-0 Vicryl was used to close the bladder flap.  The uterus was returned to the abdomen. A 2-0 Vicryl was used to re-approximate the peritoneum. The muscle was re-approximated with 2-0 Chromic. The fascia was then closed with 0 Vicryl in a running fashion. The subcutaneous tissue was re-approximated with a plain gut suture at the deep subcutaneous layer followed by a 3-0 Vicryl in the superficial layer. The skin was closed with 3-0 Vicryl on a Jordan needle.  An Aquacel dressing was placed over the incision. The patient tolerated the procedure well. Sponge, needle, and instrument counts were correct x2.            Specimens:   Specimen (12h ago, onward)    None          Condition: Good         Delivery Information for  Jordon Borrego    Birth information:  YOB: 2021   Time of birth: 7:00 PM   Sex: male   Head Delivery Date/Time: 3/26/2019  7:00 PM   Delivery type: , Low Transverse   Gestational Age: 37w6d    Delivery Providers    Delivering clinician:  Miko Albert MD   Provider Role    Kirsten Marrero RN Registered Nurse    Laila Dowd RN Registered Nurse    Mercy Gardiner, RRT Night Respiratory Care Practitioner    Catia Currie RN Registered Nurse    Natali Almaguer Surgical Tech    Flaco Guidry Surgical Tech    Miguel Angel Bueno MD  Pediatrician    Micky Perez, CRNA Nurse Anesthetist            Measurements    Weight:  (No Documentation)  Length:  (No Documentation)         Apgars    Living status:  Living  Apgars:   1 min.:   5 min.:   10 min.:   15 min.:   20 min.:     Skin color:   0  2       Heart rate:   2  2       Reflex irritability:   2  2       Muscle tone:   2  2       Respiratory effort:   2  2       Total:   8  10       Apgars assigned by:  ALYSSA HUBER RN         Operative Delivery    Forceps attempted?:  No  Vacuum extractor attempted?:  No         Shoulder Dystocia    Shoulder dystocia present?:  No           Presentation    Presentation:  Vertex  Position:  Left Occiput Anterior           Interventions/Resuscitation    Method:  Bulb Suctioning, Tactile Stimulation       Cord    Vessels:  3 vessels  Complications:  None  Delayed Cord Clamping?:  No  Cord Clamped Date/Time:  3/26/2019  7:00 PM  Cord Blood Disposition:  Lab  Gases Sent?:  No  Stem Cell Collection (by MD):  No       Placenta    Placenta delivery date/time:  3/26/2019 1901  Placenta removal:  Manual removal  Placenta appearance:  Intact  Placenta disposition:  discarded           Labor Events:       labor: No     Labor Onset Date/Time:         Dilation Complete Date/Time:         Start Pushing Date/Time:       Rupture Date/Time:              Rupture type:           Fluid Amount:        Fluid Color:        Fluid Odor:        Membrane Status (PeriCalm):        Rupture Date/Time (PeriCalm):        Fluid Amount (PeriCalm):        Fluid Color (PeriCalm):         steroids: None     Antibiotics given for GBS: No     Induction: none     Indications for induction:        Augmentation:       Indications for augmentation:       Labor complications: None     Additional complications:          Cervical ripening:                     Delivery:      Episiotomy: None     Indication for Episiotomy:       Perineal Lacerations: None Repaired:      Periurethral  Laceration:   Repaired:     Labial Laceration:   Repaired:     Sulcus Laceration:   Repaired:     Vaginal Laceration:   Repaired:     Cervical Laceration:   Repaired:     Repair suture: None     Repair # of packets:       Last Value - EBL - Nursing (mL): 0     Sum - EBL - Nursing (mL): 655     Last Value - EBL - Anesthesia (mL):      Calculated QBL (mL): 655      Vaginal Sweep Performed: No     Surgicount Correct: Yes       Other providers:       Anesthesia    Method:  Spinal          Details (if applicable):  Trial of Labor No    Categorization: Repeat    Priority: Routine   Indications for : Repeat Section;Other (Add Comments)   Incision Type: low transverse     Additional  information:  Forceps:    Vacuum:    Breech:    Observed anomalies    Other (Comments): Repeat c/s for pre eclampsia at 37 weeks and 6 days             Cheiloplasty (Complex) Text: A decision was made to reconstruct the defect with a  cheiloplasty.  The defect was undermined extensively.  Additional obicularis oris muscle was excised with a 15c blade scalpel.  The defect was converted into a full thickness wedge to facilite a better cosmetic result.  Small vessels were then tied off with 5-0 monocyrl. The obicularis oris, superficial fascia, adipose and dermis were then reapproximated.  After the deeper layers were approximated the epidermis was reapproximated with particular care given to realign the vermilion border.

## 2023-10-05 NOTE — LETTER
February 1, 2019        Josiane Mills MD  [address] [address]             [facility]  [address], [address]  [facility] [address]  Phone: [phone]  Fax: [phone]   Patient: Deneen Borrego   MR Number: [id number]   YOB: 1985   Date of Visit: 1/30/2019       Dear Dr. Mills:    Thank you for referring Deneen Borrego to me for evaluation. Attached you will find relevant portions of my assessment and plan of care.    If you have questions, please do not hesitate to call me. I look forward to following Deneen Borrego along with you.    Sincerely,      Go Reagan MD            CC  Cynthia Sanders MD    [facility]          no

## 2023-10-20 ENCOUNTER — CLINICAL SUPPORT (OUTPATIENT)
Dept: OTHER | Facility: CLINIC | Age: 38
End: 2023-10-20
Payer: COMMERCIAL

## 2023-10-20 DIAGNOSIS — Z00.8 ENCOUNTER FOR OTHER GENERAL EXAMINATION: ICD-10-CM

## 2023-10-21 VITALS
SYSTOLIC BLOOD PRESSURE: 125 MMHG | DIASTOLIC BLOOD PRESSURE: 85 MMHG | BODY MASS INDEX: 35.26 KG/M2 | HEIGHT: 63 IN | WEIGHT: 199 LBS

## 2023-10-21 LAB
HDLC SERPL-MCNC: 36 MG/DL
POC CHOLESTEROL, LDL (DOCK): 102 MG/DL
POC CHOLESTEROL, TOTAL: 151 MG/DL
POC GLUCOSE, FASTING: 74 MG/DL (ref 60–110)
TRIGL SERPL-MCNC: 62 MG/DL

## 2023-10-23 DIAGNOSIS — E89.0 POSTOPERATIVE HYPOTHYROIDISM: ICD-10-CM

## 2023-10-23 RX ORDER — LEVOTHYROXINE SODIUM 100 UG/1
100 TABLET ORAL
Qty: 30 TABLET | Refills: 0 | Status: SHIPPED | OUTPATIENT
Start: 2023-10-23 | End: 2023-12-12 | Stop reason: SDUPTHER

## 2023-12-05 ENCOUNTER — OFFICE VISIT (OUTPATIENT)
Dept: OBSTETRICS AND GYNECOLOGY | Facility: CLINIC | Age: 38
End: 2023-12-05
Payer: COMMERCIAL

## 2023-12-05 VITALS
HEIGHT: 63 IN | DIASTOLIC BLOOD PRESSURE: 80 MMHG | WEIGHT: 200.31 LBS | RESPIRATION RATE: 12 BRPM | BODY MASS INDEX: 35.49 KG/M2 | OXYGEN SATURATION: 99 % | SYSTOLIC BLOOD PRESSURE: 126 MMHG | HEART RATE: 62 BPM

## 2023-12-05 DIAGNOSIS — R68.82 DECREASED SEX DRIVE: ICD-10-CM

## 2023-12-05 DIAGNOSIS — N89.8 VAGINAL DRYNESS: Primary | ICD-10-CM

## 2023-12-05 DIAGNOSIS — L90.0 LICHEN SCLEROSUS: ICD-10-CM

## 2023-12-05 DIAGNOSIS — Z79.890 HORMONE REPLACEMENT THERAPY (HRT): ICD-10-CM

## 2023-12-05 PROCEDURE — 3074F SYST BP LT 130 MM HG: CPT | Mod: CPTII,S$GLB,, | Performed by: OBSTETRICS & GYNECOLOGY

## 2023-12-05 PROCEDURE — 3079F PR MOST RECENT DIASTOLIC BLOOD PRESSURE 80-89 MM HG: ICD-10-PCS | Mod: CPTII,S$GLB,, | Performed by: OBSTETRICS & GYNECOLOGY

## 2023-12-05 PROCEDURE — 3008F PR BODY MASS INDEX (BMI) DOCUMENTED: ICD-10-PCS | Mod: CPTII,S$GLB,, | Performed by: OBSTETRICS & GYNECOLOGY

## 2023-12-05 PROCEDURE — 99213 PR OFFICE/OUTPT VISIT, EST, LEVL III, 20-29 MIN: ICD-10-PCS | Mod: S$GLB,,, | Performed by: OBSTETRICS & GYNECOLOGY

## 2023-12-05 PROCEDURE — 3079F DIAST BP 80-89 MM HG: CPT | Mod: CPTII,S$GLB,, | Performed by: OBSTETRICS & GYNECOLOGY

## 2023-12-05 PROCEDURE — 3044F HG A1C LEVEL LT 7.0%: CPT | Mod: CPTII,S$GLB,, | Performed by: OBSTETRICS & GYNECOLOGY

## 2023-12-05 PROCEDURE — 99999 PR PBB SHADOW E&M-EST. PATIENT-LVL III: CPT | Mod: PBBFAC,,, | Performed by: OBSTETRICS & GYNECOLOGY

## 2023-12-05 PROCEDURE — 1159F MED LIST DOCD IN RCRD: CPT | Mod: CPTII,S$GLB,, | Performed by: OBSTETRICS & GYNECOLOGY

## 2023-12-05 PROCEDURE — 3008F BODY MASS INDEX DOCD: CPT | Mod: CPTII,S$GLB,, | Performed by: OBSTETRICS & GYNECOLOGY

## 2023-12-05 PROCEDURE — 3074F PR MOST RECENT SYSTOLIC BLOOD PRESSURE < 130 MM HG: ICD-10-PCS | Mod: CPTII,S$GLB,, | Performed by: OBSTETRICS & GYNECOLOGY

## 2023-12-05 PROCEDURE — 99999 PR PBB SHADOW E&M-EST. PATIENT-LVL III: ICD-10-PCS | Mod: PBBFAC,,, | Performed by: OBSTETRICS & GYNECOLOGY

## 2023-12-05 PROCEDURE — 3044F PR MOST RECENT HEMOGLOBIN A1C LEVEL <7.0%: ICD-10-PCS | Mod: CPTII,S$GLB,, | Performed by: OBSTETRICS & GYNECOLOGY

## 2023-12-05 PROCEDURE — 1159F PR MEDICATION LIST DOCUMENTED IN MEDICAL RECORD: ICD-10-PCS | Mod: CPTII,S$GLB,, | Performed by: OBSTETRICS & GYNECOLOGY

## 2023-12-05 PROCEDURE — 99213 OFFICE O/P EST LOW 20 MIN: CPT | Mod: S$GLB,,, | Performed by: OBSTETRICS & GYNECOLOGY

## 2023-12-05 RX ORDER — ESTRADIOL 1 MG/1
1 TABLET ORAL DAILY
Qty: 30 TABLET | Refills: 6 | Status: SHIPPED | OUTPATIENT
Start: 2023-12-05 | End: 2024-12-04

## 2023-12-05 RX ORDER — LAMOTRIGINE 100 MG/1
100 TABLET ORAL DAILY
Qty: 30 TABLET | Refills: 3 | Status: SHIPPED | OUTPATIENT
Start: 2023-12-05

## 2023-12-05 RX ORDER — BUPROPION HYDROCHLORIDE 150 MG/1
150 TABLET ORAL EVERY MORNING
Qty: 30 TABLET | Refills: 3 | Status: SHIPPED | OUTPATIENT
Start: 2023-12-05

## 2023-12-06 NOTE — PROGRESS NOTES
Subjective:    Patient ID: Deneen Borrego is a 38 y.o. y.o. female.     Chief Complaint:   Chief Complaint   Patient presents with    medication refills     Discuss medication         History of Present Illness:  Deneen presents today for follow up. She was prescribed estradiol at her last visit. She is on 0.5mg and reports it is working well but could work better and is requesting an increase in dosage. The clobetasol is also working well. She needs her psychiatric medications refilled until she can get a local MD follow up.         ROS:   Review of Systems   Constitutional:  Negative for activity change, appetite change, chills, diaphoresis, fatigue, fever and unexpected weight change.   HENT:  Negative for mouth sores and tinnitus.    Eyes:  Negative for discharge and visual disturbance.   Respiratory:  Negative for cough, shortness of breath and wheezing.    Cardiovascular:  Negative for chest pain, palpitations and leg swelling.   Gastrointestinal:  Negative for abdominal pain, bloating, blood in stool, constipation, diarrhea, nausea and vomiting.   Endocrine: Positive for hot flashes. Negative for diabetes, hair loss, hyperthyroidism and hypothyroidism.   Genitourinary:  Positive for vaginal dryness. Negative for dysuria, flank pain, frequency, genital sores, hematuria, urgency, vaginal bleeding, vaginal discharge, vaginal pain, postcoital bleeding and vaginal odor.   Musculoskeletal:  Negative for back pain, joint swelling and myalgias.   Integumentary:  Negative for rash, acne, breast mass, nipple discharge and breast skin changes.   Neurological:  Negative for seizures, syncope, numbness and headaches.   Hematological:  Negative for adenopathy. Does not bruise/bleed easily.   Psychiatric/Behavioral:  Negative for depression and sleep disturbance. The patient is not nervous/anxious.    Breast: Negative for mass, mastodynia, nipple discharge and skin changes          Objective:    Vital Signs:  Vitals:     12/05/23 1659   BP: 126/80   Pulse: 62   Resp: 12       Physical Exam:  General:  alert, cooperative, no distress   Head Normocephalic, without obvious abnormality, atraumatic   Neck .supple, symmetrical, trachea midline   Skin:  Skin color, texture, turgor normal. No rashes or lesions   Abdomen:  soft, non-tender; bowel sounds normal   Pelvis: Deferred   Extremities extremities normal, atraumatic, no cyanosis or edema   Neurologic Grossly normal        Assessment:      1. Vaginal dryness    2. Decreased sex drive    3. Hormone replacement therapy (HRT)    4. Lichen sclerosus          Plan:      PLAN:   1. Refill Wellbutrin and lamictal  2. Increase estrace 1mg

## 2023-12-12 ENCOUNTER — OFFICE VISIT (OUTPATIENT)
Dept: FAMILY MEDICINE | Facility: CLINIC | Age: 38
End: 2023-12-12
Payer: COMMERCIAL

## 2023-12-12 VITALS
SYSTOLIC BLOOD PRESSURE: 106 MMHG | OXYGEN SATURATION: 98 % | DIASTOLIC BLOOD PRESSURE: 74 MMHG | HEART RATE: 65 BPM | BODY MASS INDEX: 38.42 KG/M2 | WEIGHT: 203.5 LBS | HEIGHT: 61 IN | TEMPERATURE: 98 F

## 2023-12-12 DIAGNOSIS — Z00.00 ENCOUNTER FOR BLOOD TEST FOR ROUTINE GENERAL PHYSICAL EXAMINATION: ICD-10-CM

## 2023-12-12 DIAGNOSIS — E89.0 POSTOPERATIVE HYPOTHYROIDISM: ICD-10-CM

## 2023-12-12 DIAGNOSIS — E89.0 S/P PARTIAL THYROIDECTOMY: ICD-10-CM

## 2023-12-12 DIAGNOSIS — Z13.220 SCREENING CHOLESTEROL LEVEL: ICD-10-CM

## 2023-12-12 DIAGNOSIS — F33.41 RECURRENT MAJOR DEPRESSIVE DISORDER, IN PARTIAL REMISSION: ICD-10-CM

## 2023-12-12 DIAGNOSIS — Z00.00 ANNUAL PHYSICAL EXAM: Primary | ICD-10-CM

## 2023-12-12 DIAGNOSIS — Z13.1 DIABETES MELLITUS SCREENING: ICD-10-CM

## 2023-12-12 DIAGNOSIS — E66.01 CLASS 2 SEVERE OBESITY DUE TO EXCESS CALORIES WITH SERIOUS COMORBIDITY AND BODY MASS INDEX (BMI) OF 38.0 TO 38.9 IN ADULT: ICD-10-CM

## 2023-12-12 DIAGNOSIS — Z13.0 SCREENING FOR DEFICIENCY ANEMIA: ICD-10-CM

## 2023-12-12 DIAGNOSIS — L90.0 LICHEN SCLEROSUS ET ATROPHICUS: ICD-10-CM

## 2023-12-12 DIAGNOSIS — Z23 FLU VACCINE NEED: ICD-10-CM

## 2023-12-12 DIAGNOSIS — F41.1 GENERALIZED ANXIETY DISORDER: ICD-10-CM

## 2023-12-12 DIAGNOSIS — M79.7 FIBROMYALGIA: ICD-10-CM

## 2023-12-12 PROBLEM — E66.812 CLASS 2 SEVERE OBESITY DUE TO EXCESS CALORIES WITH SERIOUS COMORBIDITY AND BODY MASS INDEX (BMI) OF 38.0 TO 38.9 IN ADULT: Status: ACTIVE | Noted: 2022-11-01

## 2023-12-12 PROCEDURE — 90686 IIV4 VACC NO PRSV 0.5 ML IM: CPT | Mod: S$GLB,,, | Performed by: NURSE PRACTITIONER

## 2023-12-12 PROCEDURE — 99999 PR PBB SHADOW E&M-EST. PATIENT-LVL IV: CPT | Mod: PBBFAC,,, | Performed by: NURSE PRACTITIONER

## 2023-12-12 PROCEDURE — 99396 PR PREVENTIVE VISIT,EST,40-64: ICD-10-PCS | Mod: 25,S$GLB,, | Performed by: NURSE PRACTITIONER

## 2023-12-12 PROCEDURE — 1160F PR REVIEW ALL MEDS BY PRESCRIBER/CLIN PHARMACIST DOCUMENTED: ICD-10-PCS | Mod: CPTII,S$GLB,, | Performed by: NURSE PRACTITIONER

## 2023-12-12 PROCEDURE — 99396 PREV VISIT EST AGE 40-64: CPT | Mod: 25,S$GLB,, | Performed by: NURSE PRACTITIONER

## 2023-12-12 PROCEDURE — 3044F PR MOST RECENT HEMOGLOBIN A1C LEVEL <7.0%: ICD-10-PCS | Mod: CPTII,S$GLB,, | Performed by: NURSE PRACTITIONER

## 2023-12-12 PROCEDURE — 3078F PR MOST RECENT DIASTOLIC BLOOD PRESSURE < 80 MM HG: ICD-10-PCS | Mod: CPTII,S$GLB,, | Performed by: NURSE PRACTITIONER

## 2023-12-12 PROCEDURE — 3008F BODY MASS INDEX DOCD: CPT | Mod: CPTII,S$GLB,, | Performed by: NURSE PRACTITIONER

## 2023-12-12 PROCEDURE — 90471 FLU VACCINE (QUAD) GREATER THAN OR EQUAL TO 3YO PRESERVATIVE FREE IM: ICD-10-PCS | Mod: S$GLB,,, | Performed by: NURSE PRACTITIONER

## 2023-12-12 PROCEDURE — 90471 IMMUNIZATION ADMIN: CPT | Mod: S$GLB,,, | Performed by: NURSE PRACTITIONER

## 2023-12-12 PROCEDURE — 99999 PR PBB SHADOW E&M-EST. PATIENT-LVL IV: ICD-10-PCS | Mod: PBBFAC,,, | Performed by: NURSE PRACTITIONER

## 2023-12-12 PROCEDURE — 3074F PR MOST RECENT SYSTOLIC BLOOD PRESSURE < 130 MM HG: ICD-10-PCS | Mod: CPTII,S$GLB,, | Performed by: NURSE PRACTITIONER

## 2023-12-12 PROCEDURE — 1159F PR MEDICATION LIST DOCUMENTED IN MEDICAL RECORD: ICD-10-PCS | Mod: CPTII,S$GLB,, | Performed by: NURSE PRACTITIONER

## 2023-12-12 PROCEDURE — 3008F PR BODY MASS INDEX (BMI) DOCUMENTED: ICD-10-PCS | Mod: CPTII,S$GLB,, | Performed by: NURSE PRACTITIONER

## 2023-12-12 PROCEDURE — 3074F SYST BP LT 130 MM HG: CPT | Mod: CPTII,S$GLB,, | Performed by: NURSE PRACTITIONER

## 2023-12-12 PROCEDURE — 3078F DIAST BP <80 MM HG: CPT | Mod: CPTII,S$GLB,, | Performed by: NURSE PRACTITIONER

## 2023-12-12 PROCEDURE — 3044F HG A1C LEVEL LT 7.0%: CPT | Mod: CPTII,S$GLB,, | Performed by: NURSE PRACTITIONER

## 2023-12-12 PROCEDURE — 1159F MED LIST DOCD IN RCRD: CPT | Mod: CPTII,S$GLB,, | Performed by: NURSE PRACTITIONER

## 2023-12-12 PROCEDURE — 1160F RVW MEDS BY RX/DR IN RCRD: CPT | Mod: CPTII,S$GLB,, | Performed by: NURSE PRACTITIONER

## 2023-12-12 PROCEDURE — 90686 FLU VACCINE (QUAD) GREATER THAN OR EQUAL TO 3YO PRESERVATIVE FREE IM: ICD-10-PCS | Mod: S$GLB,,, | Performed by: NURSE PRACTITIONER

## 2023-12-12 RX ORDER — LEVOTHYROXINE SODIUM 100 UG/1
100 TABLET ORAL
Qty: 90 TABLET | Refills: 3 | Status: SHIPPED | OUTPATIENT
Start: 2023-12-12

## 2023-12-12 NOTE — PROGRESS NOTES
Subjective:       Patient ID: Deneen Borrego is a 38 y.o. female.    Chief Complaint: Annual Exam    HPI      Patient is a 38 year old white female with Recurrent Depression and Anxiety treated by psychiatry, post-surgical hypothyroidism s/p partial right thyroidectomy, Fibromyalgia diagnosed by Internal Medicine NP 4/2020 (no rheumatology evaluation), Lichen Sclerosus/psoriasis followed by GYN MD, obesity and history of migraines that is here today for ANNUAL physical exam with fasting lab results.     Recurrent Depression and Anxiety   treated by psychiatry [facility] but does report the cost is getting high so she is seeking care for another psychiatric provider.  Currently taking Wellbutrin  mg daily and Lamictal 100 mg daily  Gave handout on local psychiatric providers in area to call for appointment.        Post-surgical hypothyroidism s/p partial right thyroidectomy  Right thyroidectomy due to nodule that was benign  Currently taking Levothyroxine 100mcg daily - taking on empty stomach in AM before any other food/drink or med.  TSH WNL           Fibromyalgia   diagnosed by Internal Medicine NP 4/2020 (no rheumatology evaluation)  Not on any medications.  Working on anti-inflammatory diet.     Lichen Sclerosus/psoriasis   followed by GYN MD     Obesity  Body mass index is 38.45 kg/m².  Working on lifestyle modifications     Wellness labs:  CBC WNL  CMP okay  Cholsterol okay  TSH controlled on medication  HgbA1C 4.5%    Health Maintenance:  Flu vaccine today  Mammogram and PAP up to date    Lab Visit on 12/05/2023   Component Date Value Ref Range Status    WBC 12/05/2023 6.07  3.90 - 12.70 K/uL Final    RBC 12/05/2023 4.39  4.00 - 5.40 M/uL Final    Hemoglobin 12/05/2023 13.0  12.0 - 16.0 g/dL Final    Hematocrit 12/05/2023 40.1  37.0 - 48.5 % Final    MCV 12/05/2023 91  82 - 98 fL Final    MCH 12/05/2023 29.6  27.0 - 31.0 pg Final    MCHC 12/05/2023 32.4  32.0 - 36.0 g/dL  Final    RDW 12/05/2023 13.7  11.5 - 14.5 % Final    Platelets 12/05/2023 241  150 - 450 K/uL Final    MPV 12/05/2023 11.1  9.2 - 12.9 fL Final    Immature Granulocytes 12/05/2023 0.3  0.0 - 0.5 % Final    Gran # (ANC) 12/05/2023 3.8  1.8 - 7.7 K/uL Final    Immature Grans (Abs) 12/05/2023 0.02  0.00 - 0.04 K/uL Final    Comment: Mild elevation in immature granulocytes is non specific and   can be seen in a variety of conditions including stress response,   acute inflammation, trauma and pregnancy. Correlation with other   laboratory and clinical findings is essential.      Lymph # 12/05/2023 1.6  1.0 - 4.8 K/uL Final    Mono # 12/05/2023 0.4  0.3 - 1.0 K/uL Final    Eos # 12/05/2023 0.1  0.0 - 0.5 K/uL Final    Baso # 12/05/2023 0.08  0.00 - 0.20 K/uL Final    nRBC 12/05/2023 0  0 /100 WBC Final    Gran % 12/05/2023 63.3  38.0 - 73.0 % Final    Lymph % 12/05/2023 25.9  18.0 - 48.0 % Final    Mono % 12/05/2023 6.9  4.0 - 15.0 % Final    Eosinophil % 12/05/2023 2.3  0.0 - 8.0 % Final    Basophil % 12/05/2023 1.3  0.0 - 1.9 % Final    Differential Method 12/05/2023 Automated   Final    Sodium 12/05/2023 144  136 - 145 mmol/L Final    Potassium 12/05/2023 4.2  3.5 - 5.1 mmol/L Final    Chloride 12/05/2023 109  95 - 110 mmol/L Final    CO2 12/05/2023 25  23 - 29 mmol/L Final    Glucose 12/05/2023 98  70 - 110 mg/dL Final    BUN 12/05/2023 15  7 - 17 mg/dL Final    Creatinine 12/05/2023 0.75  0.50 - 1.40 mg/dL Final    Calcium 12/05/2023 9.2  8.7 - 10.5 mg/dL Final    Total Protein 12/05/2023 7.3  6.0 - 8.4 g/dL Final    Albumin 12/05/2023 4.0  3.5 - 5.2 g/dL Final    Total Bilirubin 12/05/2023 0.3  0.1 - 1.0 mg/dL Final    Comment: For infants and newborns, interpretation of results should be based  on gestational age, weight and in agreement with clinical  observations.    Premature Infant recommended reference ranges:  Up to 24 hours.............<8.0 mg/dL  Up to 48 hours............<12.0 mg/dL  3-5  days..................<15.0 mg/dL  6-29 days.................<15.0 mg/dL      Alkaline Phosphatase 12/05/2023 68  38 - 126 U/L Final    AST 12/05/2023 17  15 - 46 U/L Final    ALT 12/05/2023 15  10 - 44 U/L Final    Anion Gap 12/05/2023 10  8 - 16 mmol/L Final    eGFR 12/05/2023 >60.0  >60 mL/min/1.73 m^2 Final    Hemoglobin A1C 12/05/2023 4.5  4.0 - 5.6 % Final    Comment: ADA Screening Guidelines:  5.7-6.4%  Consistent with prediabetes  >or=6.5%  Consistent with diabetes    High levels of fetal hemoglobin interfere with the HbA1C  assay. Heterozygous hemoglobin variants (HbS, HgC, etc)do  not significantly interfere with this assay.   However, presence of multiple variants may affect accuracy.      Estimated Avg Glucose 12/05/2023 82  68 - 131 mg/dL Final    Cholesterol 12/05/2023 168  120 - 199 mg/dL Final    Comment: The National Cholesterol Education Program (NCEP) has set the  following guidelines (reference ranges) for Cholesterol:  Optimal.....................<200 mg/dL  Borderline High.............200-239 mg/dL  High........................> or = 240 mg/dL      Triglycerides 12/05/2023 56  30 - 150 mg/dL Final    Comment: The National Cholesterol Education Program (NCEP) has set the  following guidelines (reference values) for triglycerides:  Normal......................<150 mg/dL  Borderline High.............150-199 mg/dL  High........................200-499 mg/dL      HDL 12/05/2023 39 (L)  40 - 75 mg/dL Final    Comment: The National Cholesterol Education Program (NCEP) has set the  following guidelines (reference values) for HDL Cholesterol:  Low...............<40 mg/dL  Optimal...........>60 mg/dL      LDL Cholesterol 12/05/2023 117.8  63.0 - 159.0 mg/dL Final    Comment: The National Cholesterol Education Program (NCEP) has set the  following guidelines (reference values) for LDL Cholesterol:  Optimal.......................<130 mg/dL  Borderline High...............130-159  "mg/dL  High..........................160-189 mg/dL  Very High.....................>190 mg/dL      HDL/Cholesterol Ratio 12/05/2023 23.2  20.0 - 50.0 % Final    Total Cholesterol/HDL Ratio 12/05/2023 4.3  2.0 - 5.0 Final    Non-HDL Cholesterol 12/05/2023 129  mg/dL Final    Comment: Risk category and Non-HDL cholesterol goals:  Coronary heart disease (CHD)or equivalent (10-year risk of CHD >20%):  Non-HDL cholesterol goal     <130 mg/dL  Two or more CHD risk factors and 10-year risk of CHD <= 20%:  Non-HDL cholesterol goal     <160 mg/dL  0 to 1 CHD risk factor:  Non-HDL cholesterol goal     <190 mg/dL      TSH 12/05/2023 3.170  0.400 - 4.000 uIU/mL Final    Comment: Warning:  Heterophilic antibodies in serum or plasma of   certain individuals are known to cause interference with   immunoassays. These antibodies may be present in blood samples   from individuals regularly exposed to animal or who have been   treated with animal products.     Patients taking high doses of supplemental biotin may have  negatively biased results.       Free T4 12/05/2023 0.89  0.71 - 1.51 ng/dL Final          Review of Systems   HENT: Negative.     Respiratory: Negative.     Cardiovascular: Negative.    Gastrointestinal: Negative.          Objective:     Vitals:    12/12/23 1308   BP: 106/74   BP Location: Right arm   Patient Position: Sitting   BP Method: Large (Manual)   Pulse: 65   Temp: 98.1 °F (36.7 °C)   TempSrc: Temporal   SpO2: 98%   Weight: 92.3 kg (203 lb 7.8 oz)   Height: 5' 1" (1.549 m)          Physical Exam  Constitutional:       General: She is not in acute distress.     Appearance: Normal appearance. She is obese. She is not ill-appearing, toxic-appearing or diaphoretic.      Comments: Body mass index is 38.45 kg/m².       HENT:      Head: Normocephalic and atraumatic.      Right Ear: Tympanic membrane, ear canal and external ear normal. There is no impacted cerumen.      Left Ear: Tympanic membrane, ear canal and " external ear normal. There is no impacted cerumen.      Nose: No congestion or rhinorrhea.      Mouth/Throat:      Pharynx: No oropharyngeal exudate.   Eyes:      General:         Right eye: No discharge.         Left eye: No discharge.      Extraocular Movements: Extraocular movements intact.      Conjunctiva/sclera: Conjunctivae normal.   Cardiovascular:      Rate and Rhythm: Normal rate and regular rhythm.      Heart sounds: Normal heart sounds. No murmur heard.  Pulmonary:      Effort: Pulmonary effort is normal. No respiratory distress.      Breath sounds: Normal breath sounds. No stridor. No wheezing, rhonchi or rales.   Abdominal:      General: Bowel sounds are normal. There is no distension.      Palpations: Abdomen is soft.      Tenderness: There is no abdominal tenderness. There is no guarding.   Musculoskeletal:         General: Normal range of motion.      Cervical back: Normal range of motion.      Right lower leg: No edema.      Left lower leg: No edema.   Lymphadenopathy:      Cervical: No cervical adenopathy.   Skin:     General: Skin is warm and dry.      Findings: No rash.   Neurological:      Mental Status: She is alert and oriented to person, place, and time.   Psychiatric:         Mood and Affect: Mood normal.         Behavior: Behavior normal.         Thought Content: Thought content normal.         Judgment: Judgment normal.           Assessment:         ICD-10-CM ICD-9-CM   1. Annual physical exam  Z00.00 V70.0   2. Recurrent major depressive disorder, in partial remission  F33.41 296.35   3. Generalized anxiety disorder  F41.1 300.02   4. Postoperative hypothyroidism  E89.0 244.0   5. S/P partial thyroidectomy  E89.0 246.8   6. Fibromyalgia  M79.7 729.1   7. Lichen sclerosus et atrophicus  L90.0 701.0   8. Class 2 severe obesity due to excess calories with serious comorbidity and body mass index (BMI) of 38.0 to 38.9 in adult  E66.01 278.01    Z68.38 V85.38   9. Flu vaccine need  Z23 V04.81    10. Encounter for blood test for routine general physical examination  Z00.00 V72.62   11. Screening for deficiency anemia  Z13.0 V78.1   12. Screening cholesterol level  Z13.220 V77.91   13. Diabetes mellitus screening  Z13.1 V77.1       Plan:       1. Annual physical exam   Health Maintenance Summary   Full History      Expand All  Collapse All  Ordered - Influenza Vaccine   (1)Ordered on 12/12/2023  10/11/2018  Imm Admin: Influenza - Quadrivalent - PF *Preferred* (6 months and older)   Postponed - COVID-19 Vaccine   (3 - 2023-24 season)Postponed until 12/12/2024 08/26/2021  Imm Admin: COVID-19, MRNA, LN-S, PF (Pfizer) (Purple Cap)   04/21/2021  Imm Admin: COVID-19, MRNA, LN-S, PF (Pfizer) (Purple Cap)   Mammogram   (Yearly)Next due on 8/8/2024 08/08/2023  Mammo Digital Screening Bilat w/ Honorio   03/10/2017  Mammo Digital Diagnostic Bilat with CAD   Ordered - Hemoglobin A1c (Diabetic Prevention Screening)   (Every 3 Years)Ordered on 12/12/2023 12/05/2023  Hemoglobin A1C External component of Hemoglobin A1C   10/31/2022  Hemoglobin A1C External component of Hemoglobin A1C   04/15/2020  Hemoglobin A1C, POC component of Hemoglobin A1C, POCT   TETANUS VACCINE   (Every 10 Years)Next due on 1/16/2029 01/16/2019  Imm Admin: Tdap   HIV Screening  Completed  08/16/2018  HIV-1 and HIV-2 antibodies   Hepatitis C Screening  Completed  10/31/2022  Hepatitis C Ab component of Hepatitis C Antibody   12/28/2020  [facility]  HEPATITIS C AB TEST   Lipid Panel  Ordered on 12/12/2023 12/05/2023  Cholesterol Total component of Lipid Panel   10/20/2023  POCT Lipid Profile w/ Glucose   10/31/2022  Cholesterol Total component of Lipid Panel   09/30/2022  POCT Lipid Profile w/ Glucose   02/09/2022  Outside Claim: [facility] LIPID PANEL   View More History   Pneumococcal Vaccines (Age 0-64)   (Series Information)Aged Out  No completion history exists for this topic.       2. Recurrent major depressive disorder, in partial  remission  Overview:  treated by psychiatry [facility] but does report the cost is getting high so she is seeking care for another psychiatric provider.  Currently taking Wellbutrin  mg daily and Lamictal 100 mg daily  Gave handout on local psychiatric providers in area to call for appointment.         3. Generalized anxiety disorder  Overview:  treated by psychiatry [facility] but does report the cost is getting high so she is seeking care for another psychiatric provider.  Currently taking Wellbutrin  mg daily and Lamictal 100 mg daily  Gave handout on local psychiatric providers in area to call for appointment.         4. Postoperative hypothyroidism  Overview:  Right thyroidectomy due to nodule that was benign  Currently taking Levothyroxine 100mcg daily - taking on empty stomach in AM before any other food/drink or med.  TSH WNL      Orders:  -     levothyroxine (SYNTHROID) 100 MCG tablet; Take 1 tablet (100 mcg total) by mouth before breakfast.  Dispense: 90 tablet; Refill: 3  -     TSH; Future; Expected date: 12/12/2023    5. S/P partial thyroidectomy  Overview:  Right thyroidectomy due to nodule that was benign  Currently taking Levothyroxine 100mcg daily - taking on empty stomach in AM before any other food/drink or med.  TSH WNL        6. Fibromyalgia  Overview:  Diagnosed 4/2020 by Internal Medicine NP due to polyarthralgia/joint pains.  She has not had a Rheumatology consult.  Not on any medications.  Working on anti-inflammatory diet.      7. Lichen sclerosus et atrophicus  Overview:  followed by GYN MD      8. Class 2 severe obesity due to excess calories with serious comorbidity and body mass index (BMI) of 38.0 to 38.9 in adult  Overview:  Body mass index is 38.45 kg/m².  Working on lifestyle modifications      9. Flu vaccine need  -     Influenza - Quadrivalent *Preferred* (6 months+) (PF)    10. Encounter for blood test for routine  general physical examination  -     CBC Auto Differential; Future; Expected date: 12/12/2023  -     Hemoglobin A1C; Future; Expected date: 12/12/2023  -     Lipid Panel; Future; Expected date: 12/12/2023  -     TSH; Future; Expected date: 12/12/2023  -     Comprehensive Metabolic Panel; Future; Expected date: 12/12/2023    11. Screening for deficiency anemia  -     CBC Auto Differential; Future; Expected date: 12/12/2023    12. Screening cholesterol level  -     Lipid Panel; Future; Expected date: 12/12/2023    13. Diabetes mellitus screening  -     Hemoglobin A1C; Future; Expected date: 12/12/2023  -     Comprehensive Metabolic Panel; Future; Expected date: 12/12/2023       Follow up in about 1 year (around 12/12/2024) for fasting labs and wellness exam.     Patient's Medications   New Prescriptions    No medications on file   Previous Medications    BUPROPION (WELLBUTRIN XL) 150 MG TB24 TABLET    Take 1 tablet (150 mg total) by mouth every morning.    CLOBETASOL 0.05% (TEMOVATE) 0.05 % OINT    Apply topically 2 (two) times daily.    ESTRADIOL (ESTRACE) 0.01 % (0.1 MG/GRAM) VAGINAL CREAM    Place 1 g vaginally once daily.    ESTRADIOL (ESTRACE) 1 MG TABLET    Take 1 tablet (1 mg total) by mouth once daily.    LAMOTRIGINE (LAMICTAL) 100 MG TABLET    Take 1 tablet (100 mg total) by mouth once daily.    TRIAMCINOLONE ACETONIDE 0.1% (KENALOG) 0.1 % CREAM    Apply topically 2 (two) times daily.   Modified Medications    Modified Medication Previous Medication    LEVOTHYROXINE (SYNTHROID) 100 MCG TABLET levothyroxine (SYNTHROID) 100 MCG tablet       Take 1 tablet (100 mcg total) by mouth before breakfast.    Take 1 tablet (100 mcg total) by mouth before breakfast.   Discontinued Medications    No medications on file       Past Medical History:   Diagnosis Date    Acute right lumbar radiculopathy 01/02/2019    Cervicalgia of tvhmnwoa-iwmnhfq-[id number] region     right worse than left; right handed    COVID-19 07/29/2021     Depression, major, recurrent     [facility] [place], first  closet encinas, suicidal    Episode of recurrent major depressive disorder 04/15/2020    Fibrocystic breast changes     Fibromyalgia     Generalized anxiety disorder 04/15/2020    Hx of migraine headaches     Hypothyroidism, adult     TPO negative    LUCY (iron deficiency anemia)     Iron deficiency anemia, unspecified 04/15/2020    Lichen sclerosus et atrophicus     Morbid obesity 04/15/2020    Obesity, Class III, BMI 40-49.9 (morbid obesity)     max weight 291    Postoperative hypothyroidism 2022    Prediabetes     Psoriasis 04/15/2020    Psoriasis     Scoliosis of thoracolumbar region due to degenerative disease of spine in adult 04/15/2020    Severe anxiety with panic        Past Surgical History:   Procedure Laterality Date     SECTION N/A 2019    Procedure: REPEAT  SECTION;  Surgeon: Miko Albert MD;  [facility];  Service: OB/GYN;  Laterality: N/A;     SECTION  2008    [place]    LAPAROSCOPIC CHOLECYSTECTOMY      [place]    LAPAROSCOPIC SALPINGECTOMY Bilateral 2019    Procedure: SALPINGECTOMY, LAPAROSCOPIC;  Surgeon: Josiane Mills MD;  Location: [facility] OR;  Service: OB/GYN;  Laterality: Bilateral;    LAPAROSCOPIC TOTAL HYSTERECTOMY N/A 2019    Procedure: HYSTERECTOMY, TOTAL, LAPAROSCOPIC;  Surgeon: Josiane Mills MD;  Location: [facility] OR;  Service: OB/GYN;  Laterality: N/A;    PARTIAL HYSTERECTOMY  2019    Shelly, fibroid, heavy bleeding - still has ovaries    THYROIDECTOMY, PARTIAL Right     Sadiq, solid right nodule - benign on biopsy when removed    WISDOM TOOTH EXTRACTION         Family History   Problem Relation Age of Onset    Diabetes type II Mother     Diabetes Mother     Hyperlipidemia Father     Heart disease Father 73        AFIB    Hypertension Father     Colon polyps Father     Colon cancer Father     Cancer Father 60        colon cancer; stomach  cancer    Breast cancer Paternal Aunt     Hypertension Paternal Aunt     Diabetes Paternal Aunt     Cancer Paternal Aunt     Cancer Paternal Uncle     Diabetes Paternal Uncle     Hypertension Paternal Uncle     Breast cancer Maternal Grandmother     Dementia Maternal Grandmother     Asthma Maternal Grandmother     Vision loss Maternal Grandmother     Arrhythmia Maternal Grandfather     Cancer Maternal Grandfather     Ovarian cancer Neg Hx     Cardiomyopathy Neg Hx     Congenital heart disease Neg Hx     Early death Neg Hx     Heart attacks under age 50 Neg Hx     Pacemaker/defibrilator Neg Hx        Social History     Socioeconomic History    Marital status:      Spouse name: Go osullivan    Number of children: 3    Highest education level: Bachelor's degree (e.g., BA, AB, BS)   Occupational History    Occupation: Lead Advocate supervisor     Comment: [employer]    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      Social Determinants of Health     Stress: Stress Concern Present (4/15/2020)    [employer] [place] [employer] - Occupational Stress Questionnaire     Feeling of Stress : Rather much

## 2024-05-29 ENCOUNTER — OFFICE VISIT (OUTPATIENT)
Dept: PSYCHIATRY | Facility: CLINIC | Age: 39
End: 2024-05-29
Payer: COMMERCIAL

## 2024-05-29 VITALS
HEART RATE: 63 BPM | WEIGHT: 161.94 LBS | DIASTOLIC BLOOD PRESSURE: 62 MMHG | BODY MASS INDEX: 30.6 KG/M2 | SYSTOLIC BLOOD PRESSURE: 103 MMHG

## 2024-05-29 DIAGNOSIS — F33.1 MDD (MAJOR DEPRESSIVE DISORDER), RECURRENT EPISODE, MODERATE: Primary | ICD-10-CM

## 2024-05-29 DIAGNOSIS — F41.1 GENERALIZED ANXIETY DISORDER: Primary | ICD-10-CM

## 2024-05-29 DIAGNOSIS — F33.41 RECURRENT MAJOR DEPRESSIVE DISORDER, IN PARTIAL REMISSION: ICD-10-CM

## 2024-05-29 DIAGNOSIS — F41.1 GAD (GENERALIZED ANXIETY DISORDER): ICD-10-CM

## 2024-05-29 PROCEDURE — 99999 PR PBB SHADOW E&M-EST. PATIENT-LVL II: CPT | Mod: PBBFAC,,,

## 2024-05-29 PROCEDURE — 3074F SYST BP LT 130 MM HG: CPT | Mod: CPTII,S$GLB,,

## 2024-05-29 PROCEDURE — 90791 PSYCH DIAGNOSTIC EVALUATION: CPT | Mod: 95,,, | Performed by: PSYCHOLOGIST

## 2024-05-29 PROCEDURE — 90792 PSYCH DIAG EVAL W/MED SRVCS: CPT | Mod: S$GLB,,,

## 2024-05-29 PROCEDURE — 3078F DIAST BP <80 MM HG: CPT | Mod: CPTII,S$GLB,,

## 2024-05-29 RX ORDER — LAMOTRIGINE 100 MG/1
100 TABLET ORAL DAILY
Qty: 90 TABLET | Refills: 0 | Status: SHIPPED | OUTPATIENT
Start: 2024-05-29

## 2024-05-29 RX ORDER — PROPRANOLOL HYDROCHLORIDE 10 MG/1
10 TABLET ORAL 3 TIMES DAILY PRN
Qty: 90 TABLET | Refills: 0 | Status: SHIPPED | OUTPATIENT
Start: 2024-06-26 | End: 2025-06-26

## 2024-05-29 RX ORDER — BUPROPION HYDROCHLORIDE 300 MG/1
300 TABLET ORAL DAILY
Qty: 90 TABLET | Refills: 0 | Status: SHIPPED | OUTPATIENT
Start: 2024-05-29 | End: 2024-08-27
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
## 2024-06-25 ENCOUNTER — OFFICE VISIT (OUTPATIENT)
Dept: OBSTETRICS AND GYNECOLOGY | Facility: CLINIC | Age: 39
End: 2024-06-25
Payer: COMMERCIAL

## 2024-06-25 VITALS
SYSTOLIC BLOOD PRESSURE: 126 MMHG | RESPIRATION RATE: 14 BRPM | OXYGEN SATURATION: 100 % | HEART RATE: 70 BPM | DIASTOLIC BLOOD PRESSURE: 82 MMHG | WEIGHT: 160.25 LBS | BODY MASS INDEX: 30.26 KG/M2 | HEIGHT: 61 IN

## 2024-06-25 DIAGNOSIS — Z90.710 HISTORY OF HYSTERECTOMY: ICD-10-CM

## 2024-06-25 DIAGNOSIS — L90.0 LICHEN SCLEROSUS: ICD-10-CM

## 2024-06-25 DIAGNOSIS — Z12.31 ENCOUNTER FOR SCREENING MAMMOGRAM FOR MALIGNANT NEOPLASM OF BREAST: ICD-10-CM

## 2024-06-25 DIAGNOSIS — Z01.419 WELL WOMAN EXAM WITH ROUTINE GYNECOLOGICAL EXAM: Primary | ICD-10-CM

## 2024-06-25 DIAGNOSIS — Z79.890 HORMONE REPLACEMENT THERAPY (HRT): ICD-10-CM

## 2024-06-25 PROCEDURE — 3079F DIAST BP 80-89 MM HG: CPT | Mod: CPTII,S$GLB,, | Performed by: OBSTETRICS & GYNECOLOGY

## 2024-06-25 PROCEDURE — 3008F BODY MASS INDEX DOCD: CPT | Mod: CPTII,S$GLB,, | Performed by: OBSTETRICS & GYNECOLOGY

## 2024-06-25 PROCEDURE — 99396 PREV VISIT EST AGE 40-64: CPT | Mod: S$GLB,,, | Performed by: OBSTETRICS & GYNECOLOGY

## 2024-06-25 PROCEDURE — 3074F SYST BP LT 130 MM HG: CPT | Mod: CPTII,S$GLB,, | Performed by: OBSTETRICS & GYNECOLOGY

## 2024-06-25 PROCEDURE — 99999 PR PBB SHADOW E&M-EST. PATIENT-LVL III: CPT | Mod: PBBFAC,,, | Performed by: OBSTETRICS & GYNECOLOGY

## 2024-06-25 PROCEDURE — 1160F RVW MEDS BY RX/DR IN RCRD: CPT | Mod: CPTII,S$GLB,, | Performed by: OBSTETRICS & GYNECOLOGY

## 2024-06-25 PROCEDURE — 1159F MED LIST DOCD IN RCRD: CPT | Mod: CPTII,S$GLB,, | Performed by: OBSTETRICS & GYNECOLOGY

## 2024-06-25 RX ORDER — ESTRADIOL 1 MG/1
1 TABLET ORAL DAILY
Qty: 30 TABLET | Refills: 11 | Status: SHIPPED | OUTPATIENT
Start: 2024-06-25 | End: 2025-06-25

## 2024-06-25 RX ORDER — TACROLIMUS 1 MG/G
OINTMENT TOPICAL 2 TIMES DAILY
Qty: 60 G | Refills: 3 | Status: SHIPPED | OUTPATIENT
Start: 2024-06-25

## 2024-06-25 RX ORDER — NYSTATIN AND TRIAMCINOLONE ACETONIDE 100000; 1 [USP'U]/G; MG/G
OINTMENT TOPICAL 2 TIMES DAILY
Qty: 30 G | Refills: 3 | Status: SHIPPED | OUTPATIENT
Start: 2024-06-25

## 2024-06-25 NOTE — PROGRESS NOTES
Subjective:    Patient ID: Deneen Borrego is a 38 y.o. y.o. female.     Chief Complaint: Annual Well Woman Exam     History of Present Illness:  Deneen presents today for Annual Well Woman exam. She describes her menses as  absent s/p hysterectomy .She denies pelvic pain.  She denies breast tenderness, masses, nipple discharge. She denies difficulty with urination or bowel movements. She denies menopausal symptoms such as hotflashes, vaginal dryness, and night sweats; doing well on HRT. She denies bloating, early satiety, or weight changes. She is sexually active. Contraception is by no method.    The following portions of the patient's history were reviewed and updated as appropriate: allergies, current medications, past family history, past medical history, past social history, past surgical history and problem list.      Menstrual History:   Patient's last menstrual period was 2019..     OB History          3    Para   3    Term   3       0    AB   0    Living   3         SAB   0    IAB   0    Ectopic   0    Multiple        Live Births   3                 The following portions of the patient's history were reviewed and updated as appropriate: allergies, current medications, past family history, past medical history, past social history, past surgical history, and problem list.        ROS:     Review of Systems   Constitutional:  Negative for activity change, appetite change, chills, diaphoresis, fatigue, fever and unexpected weight change.   HENT:  Negative for mouth sores and tinnitus.    Eyes:  Negative for discharge and visual disturbance.   Respiratory:  Negative for cough, shortness of breath and wheezing.    Cardiovascular:  Negative for chest pain, palpitations and leg swelling.   Gastrointestinal:  Negative for abdominal pain, bloating, blood in stool, constipation, diarrhea, nausea and vomiting.   Endocrine: Negative for diabetes, hair loss, hot flashes, hyperthyroidism and  "hypothyroidism.   Genitourinary:  Positive for vaginal dryness. Negative for decreased libido, dyspareunia, dysuria, flank pain, frequency, genital sores, hematuria, urgency, vaginal bleeding, vaginal discharge, vaginal pain, postcoital bleeding and vaginal odor.   Musculoskeletal:  Negative for back pain, joint swelling and myalgias.   Integumentary:  Negative for rash, acne, breast mass, nipple discharge and breast skin changes.   Neurological:  Negative for seizures, syncope, numbness and headaches.   Hematological:  Negative for adenopathy. Does not bruise/bleed easily.   Psychiatric/Behavioral:  Negative for depression and sleep disturbance. The patient is not nervous/anxious.    Breast: Negative for mass, mastodynia, nipple discharge and skin changes      Objective:    Vital Signs:  Vitals:    06/25/24 1048   BP: 126/82   BP Location: Left arm   Patient Position: Sitting   BP Method: Medium (Manual)   Pulse: 70   Resp: 14   SpO2: 100%   Weight: 72.7 kg (160 lb 4.4 oz)   Height: 5' 1" (1.549 m)         Physical Exam:  General:  alert, cooperative, appears stated age   Skin:  Skin color, texture, turgor normal. No rashes or lesions   HEENT:  conjunctivae/corneas clear. PERRL.   Neck: supple, trachea midline, no adenopathy or thyromegally   Respiratory:  clear to auscultation bilaterally   Heart:  regular rate and rhythm, S1, S2 normal, no murmur, click, rub or gallop   Breasts:  no discharge, erythema, or tenderness   Abdomen:  normal findings: bowel sounds normal, no masses palpable, no organomegaly and soft, non-tender   Pelvis: External genitalia: normal general appearance, hypopigmentation at the clitoral harden and vaginal introitus  Urinary system: urethral meatus normal, bladder nontender  Vaginal: normal mucosa without prolapse or lesions  Cervix: absent, removed surgically  Uterus: absent, removed surgically  Adnexa: non palpable   Extremities: Normal ROM; no edema, no cyanosis   Neurologial: Normal " strength and tone. No focal numbness or weakness. Reflexes 2+ and equal.   Psychiatric: normal mood, speech, dress, and thought processes         Assessment:       Healthy female exam.     1. Well woman exam with routine gynecological exam    2. Encounter for screening mammogram for malignant neoplasm of breast    3. History of hysterectomy    4. Hormone replacement therapy (HRT)    5. Lichen sclerosus          Plan:       Pap smear deferred per guidelines; no longer indicated  MMG ordered  Refill estradiol  Continue estrace cream  Rx of tacrolimus  RTC in 1 year or prn      COUNSELING:  Deneen was counseled on STD pevention, use and side-effects of various contraceptive measures, A.C.O.G. Pap guidelines and recommendations for yearly pelvic exams in addition to recommendations for monthly self breast exams; to see her PCP for other health maintenance.

## 2024-07-10 ENCOUNTER — OFFICE VISIT (OUTPATIENT)
Dept: PSYCHIATRY | Facility: CLINIC | Age: 39
End: 2024-07-10
Payer: COMMERCIAL

## 2024-07-10 DIAGNOSIS — F33.41 RECURRENT MAJOR DEPRESSIVE DISORDER, IN PARTIAL REMISSION: Primary | ICD-10-CM

## 2024-07-10 DIAGNOSIS — F41.1 GAD (GENERALIZED ANXIETY DISORDER): ICD-10-CM

## 2024-07-10 PROCEDURE — 99214 OFFICE O/P EST MOD 30 MIN: CPT | Mod: 95,,,

## 2024-07-10 NOTE — PROGRESS NOTES
Outpatient Psychiatry Follow-Up Visit (PA)    07/10/2024    Clinical Status of Patient:  Outpatient (Ambulatory)    Chief Complaint:  Deneen Borrego is a 38 y.o. female who presents today for follow-up of depression and anxiety.  Met with patient.     The patient location is: [place], [place] at home  The chief complaint leading to consultation is: depression and anxiety    Visit type: audiovisual    Face to Face time with patient: 6 minutes  15 minutes of total time spent on the encounter, which includes face to face time and non-face to face time preparing to see the patient (eg, review of tests), Obtaining and/or reviewing separately obtained history, Documenting clinical information in the electronic or other health record, Independently interpreting results (not separately reported) and communicating results to the patient/family/caregiver, or Care coordination (not separately reported).     Each patient to whom he or she provides medical services by telemedicine is:  (1) informed of the relationship between the physician and patient and the respective role of any other health care provider with respect to management of the patient; and (2) notified that he or she may decline to receive medical services by telemedicine and may withdraw from such care at any time.      Current Psych Medications:   Wellbutrin  mg PO daily  Lamictal 100 mg PO daily  Propanolol 10 mg PO PRN      Interval History and Content of Current Session:  Patient seen and chart reviewed. Last seen on 5/29/2024    Patient has a psychiatric history of: depression and anxiety    Pt reports for follow up today stating that she started the Propanolol and noticed that it does help to keep her calm and minimal anxiety. She states she took it for about 3 days straight and helped, she denies having any side effects with taking it. She states that she does not take it every day, rarely has used it, but she is happy with its effects if needed. She  "reports minimal life stressors currently, out of work for the summer, reports going back on July 24th.     Mood: overall "it's fine"    Anxiety: "low, maybe a 2" (with 10/10 being the most severe)    Pt appears happy and calm    Denies adverse effects from medication    Sleep: Sleep has been good "for the most", sometimes some trouble falling asleep    Appetite: Appetite is "fine", not over eating as much    Denies SI/HI/AVH.     Pt reports taking medications as prescribed and behaving appropriately during interview today.      Psychotherapy:  Target symptoms: depression, anxiety   Why chosen therapy is appropriate versus another modality: relevant to diagnosis  Outcome monitoring methods: self-report, observation  Therapeutic intervention type: insight oriented psychotherapy, supportive psychotherapy  Topics discussed/themes: difficulty managing affect in interpersonal relationships  The patient's response to the intervention is accepting. The patient's progress toward treatment goals is excellent.   Duration of intervention: 3 minutes.    Review of Systems   PSYCHIATRIC: Pertinant items are noted in the narrative.  CONSTITUTIONAL: No weight gain or loss.   MUSCULOSKELETAL: No pain or stiffness of the joints.  NEUROLOGIC: No weakness, sensory changes, seizures, confusion, memory loss, tremor or other abnormal movements.  ENDOCRINE: No polydipsia or polyuria.  INTEGUMENTARY: No rashes or lacerations.  EYES: No exophthalmos, jaundice or blindness.  ENT: No dizziness, tinnitus or hearing loss.  RESPIRATORY: No shortness of breath.  CARDIOVASCULAR: No tachycardia or chest pain.  GASTROINTESTINAL: No nausea, vomiting, pain, constipation or diarrhea.  GENITOURINARY: No frequency, dysuria or sexual dysfunction.  HEMATOLOGIC/LYMPHATIC: No excessive bleeding, prolonged or excessive bleeding after dental extraction/injury.  ALLERGIC/IMMUNOLOGIC: No allergic response to materials, foods or animals at this time.    Past " Medication Trials:  yes - Cymbalta, Lexapro, Prozac, Zoloft    Past Medical, Family and Social History: The patient's past medical, family and social history have been reviewed and updated as appropriate within the electronic medical record - see encounter notes.    Compliance: yes    Side effects: None    Risk Parameters:  Patient reports no suicidal ideation  Patient reports no homicidal ideation  Patient reports no self-injurious behavior  Patient reports no violent behavior    Exam (detailed: at least 9 elements; comprehensive: all 15 elements)   Constitutional  Vitals:  Most recent vital signs, dated less than 90 days prior to this appointment, were reviewed.   There were no vitals filed for this visit.     General:  unremarkable, age appropriate, casually dressed     Musculoskeletal  Muscle Strength/Tone:  not examined   Gait & Station:  Unable to assess, seated     Psychiatric  Speech:  no latency; no press   Mood & Affect:  steady, happy  congruent and appropriate   Thought Process:  normal and logical   Associations:  intact   Thought Content:  normal, no suicidality, no homicidality, delusions, or paranoia   Insight:  has awareness of illness   Judgement: behavior is adequate to circumstances   Orientation:  grossly intact, person, place, day of week, month of year, year   Memory: intact for content of interview, grossly intact, memory >3 objects at five mins   Language: grossly intact, able to name, able to repeat   Attention Span & Concentration:  able to focus   Fund of Knowledge:  intact and appropriate to age and level of education, familiar with aspects of current personal life     Assessment and Diagnosis   Status/Progress: Based on the examination today, the patient's problem(s) is/are improved and well controlled.  New problems have not been presented today.   Co-morbidities are not complicating management of the primary condition.  There are no active rule-out diagnoses for this patient at this  time.     General Impression: Deneen Borrego is a 37 yo female who presents for follow up today. She is friendly and cooperative, smiling and happy mood. She reports trying Propanolol which helped with anxiety and to stay calm. She reports not needing to take it every day or often. Will continue current medications, see dosing below.       ICD-10-CM ICD-9-CM    1. Recurrent major depressive disorder, in partial remission  F33.41 296.35       2. ANASTASIYA (generalized anxiety disorder)  F41.1 300.02           Intervention/Counseling/Treatment Plan   Medication Management: Continue current medications.  Continue Wellbutrin  mg PO daily  Continue Lamictal 100 mg PO daily  Continue Propanolol 10 mg PO PRN  Discussed diagnosis, risk and benefits of proposed treatment above vs alternative treatment vs no treatment, and potential side effects of these treatments, and the inherent unpredictability of individual responses to these treatments. The patient expresses understanding and gives informed consent to pursue treatment at this time, believing that the potential benefits outweigh the potential risks. Patient has no other questions. Risks/adverse effects at this time include but are not limited to: GI side effects, sexual dysfunction, activation vs sedation, triggering of suicidal ideation, and serotonin syndrome.   Patient voices understanding and agreement with this plan  Provided crisis numbers  Encouraged patient to keep future appointments  Instruct patient to call or message with questions  In the event of an emergency, including suicidal ideation, patient was advised to go to the emergency room      Return to Clinic: 3 months    Total time: 15 minutes (which included pts differential diagnosis and prognosis for psychiatric conditions, risks, benefits of treatments, instructions and adherence to treatment plan, risk reduction, reviewing current psychiatric medication regimen, medical problems and social stressors. In  addtion to possible discussion with other healthcare provider/s)    Add on Psychotherapy time: 3 minutes    Olga Lewis PA-C

## 2024-08-05 ENCOUNTER — TELEPHONE (OUTPATIENT)
Dept: PSYCHIATRY | Facility: CLINIC | Age: 39
End: 2024-08-05
Payer: COMMERCIAL

## 2024-08-05 ENCOUNTER — OFFICE VISIT (OUTPATIENT)
Dept: PSYCHIATRY | Facility: CLINIC | Age: 39
End: 2024-08-05
Payer: COMMERCIAL

## 2024-08-05 DIAGNOSIS — R69 DIAGNOSIS DEFERRED: Primary | ICD-10-CM

## 2024-08-05 PROCEDURE — 99499 UNLISTED E&M SERVICE: CPT | Mod: 95,,, | Performed by: PSYCHOLOGIST

## 2024-08-19 ENCOUNTER — OFFICE VISIT (OUTPATIENT)
Dept: PSYCHIATRY | Facility: CLINIC | Age: 39
End: 2024-08-19
Payer: COMMERCIAL

## 2024-08-19 DIAGNOSIS — F33.1 MDD (MAJOR DEPRESSIVE DISORDER), RECURRENT EPISODE, MODERATE: Primary | ICD-10-CM

## 2024-08-19 DIAGNOSIS — F41.1 GAD (GENERALIZED ANXIETY DISORDER): ICD-10-CM

## 2024-08-19 PROCEDURE — 90837 PSYTX W PT 60 MINUTES: CPT | Mod: 95,,, | Performed by: PSYCHOLOGIST

## 2024-08-19 NOTE — PROGRESS NOTES
Individual Psychotherapy (PhD/LCSW)    8/19/2024    Therapeutic Intervention: Met with patient.  Outpatient - Behavior modifying psychotherapy 60 min - CPT code 44864    The patient location is: Patient's home/ Patient reported that her location at the time of this visit was in the [facility]     Visit type: Virtual visit with synchronous audio and video     Each patient to whom he or she provides medical services by telemedicine is: (1) informed of the relationship between the physician and patient and the respective role of any other health care provider with respect to management of the patient; and (2) notified that he or she may decline to receive medical services by telemedicine and may withdraw from such care at any time.     Chief complaint/reason for encounter: depression and anxiety     Interval history and content of current session: Patient presented casually dressed, alert, and oriented.  Patient reported experiencing depressed mood, insomnia, fatigue, poor concentration, altered libido, tearfulness, and social isolation, excessive anxiety/worry, restlessness/keyed up, irritability, and panic attacks.  Patient denied current suicidal and homicidal ideations.  Explored source of patient's depressed mood.  Patient discussed feeling overwhelmed by having to parent her children alone since her  is away for work three weeks at a time.  She discussed feelings of resentment towards her  for taking the job.  Assisted patient in processing her feelings around her relationship with her .  Educated patient about using assertive communication skills to address her concerns and feelings with her .  Modeled the use of I statements as an assertive communication technique to reduce feelings of depression and anxiety.     Treatment plan:  Target symptoms: depression, anxiety   Why chosen therapy is appropriate versus another modality: relevant to diagnosis  Outcome monitoring  methods: self-report  Therapeutic intervention type: insight oriented psychotherapy, behavior modifying psychotherapy    Risk parameters:  Patient reports no suicidal ideation  Patient reports no homicidal ideation  Patient reports no self-injurious behavior  Patient reports no violent behavior    Verbal deficits: None    Patient's response to intervention:  The patient's response to intervention is accepting.    Progress toward goals and other mental status changes:  The patient's progress toward goals is fair .    Diagnosis:     ICD-10-CM ICD-9-CM   1. MDD (major depressive disorder), recurrent episode, moderate  F33.1 296.32   2. ANASTASIYA (generalized anxiety disorder)  F41.1 300.02       Plan:  individual psychotherapy and consult psychiatrist for medication evaluation    Return to clinic: 2 weeks    Length of Service (minutes): 60

## 2024-08-31 ENCOUNTER — ON-DEMAND VIRTUAL (OUTPATIENT)
Dept: URGENT CARE | Facility: CLINIC | Age: 39
End: 2024-08-31
Payer: COMMERCIAL

## 2024-08-31 DIAGNOSIS — H10.9 CONJUNCTIVITIS, UNSPECIFIED CONJUNCTIVITIS TYPE, UNSPECIFIED LATERALITY: Primary | ICD-10-CM

## 2024-08-31 DIAGNOSIS — J06.9 UPPER RESPIRATORY TRACT INFECTION, UNSPECIFIED TYPE: ICD-10-CM

## 2024-08-31 PROCEDURE — 99213 OFFICE O/P EST LOW 20 MIN: CPT | Mod: 95,,, | Performed by: FAMILY MEDICINE

## 2024-08-31 RX ORDER — OFLOXACIN 3 MG/ML
2 SOLUTION/ DROPS OPHTHALMIC 4 TIMES DAILY
Qty: 5 ML | Refills: 0 | Status: SHIPPED | OUTPATIENT
Start: 2024-08-31 | End: 2024-09-05

## 2024-08-31 NOTE — PATIENT INSTRUCTIONS
Upper respiratory infection more likely viral etiology, continue OTC Claritin Flonase and Mucinex, in next 3 to 5 days if symptoms getting worse return  Due to patient is wearing contact, we have prescribed antibiotic eye drops for 5 days, do not wear contacts for 5 to 7 days

## 2024-08-31 NOTE — PROGRESS NOTES
Subjective:      Patient ID: Deneen Borrego is a 38 y.o. female.    Vitals:  vitals were not taken for this visit.     Chief Complaint: Nasal Congestion (5 days )      Visit Type: TELE AUDIOVISUAL    Present with the patient at the time of consultation: TELEMED PRESENT WITH PATIENT: None    Past Medical History:   Diagnosis Date    Acute right lumbar radiculopathy 2019    Cervicalgia of qhqckthm-gcedggl-oabbj region     right worse than left; right handed    COVID-19 2021    Depression, major, recurrent     [facility] [place], first  closet encinas, suicidal    Episode of recurrent major depressive disorder 04/15/2020    Fibrocystic breast changes     Fibromyalgia     Generalized anxiety disorder 04/15/2020    Hx of migraine headaches     Hypothyroidism, adult     TPO negative    LUCY (iron deficiency anemia)     Iron deficiency anemia, unspecified 04/15/2020    Lichen sclerosus et atrophicus     Morbid obesity 04/15/2020    Obesity, Class III, BMI 40-49.9 (morbid obesity)     max weight 291    Postoperative hypothyroidism 2022    Prediabetes     Psoriasis 04/15/2020    Psoriasis     Scoliosis of thoracolumbar region due to degenerative disease of spine in adult 04/15/2020    Severe anxiety with panic      Past Surgical History:   Procedure Laterality Date     SECTION N/A 2019    Procedure: REPEAT  SECTION;  Surgeon: Miko Albert MD;  Location: [facility] OR;  Service: OB/GYN;  Laterality: N/A;     SECTION  2008    [place]    LAPAROSCOPIC CHOLECYSTECTOMY      [place]    LAPAROSCOPIC SALPINGECTOMY Bilateral 2019    Procedure: SALPINGECTOMY, LAPAROSCOPIC;  Surgeon: Josiane Mills MD;  Location: [facility] OR;  Service: OB/GYN;  Laterality: Bilateral;    LAPAROSCOPIC TOTAL HYSTERECTOMY N/A 2019    Procedure: HYSTERECTOMY, TOTAL, LAPAROSCOPIC;  Surgeon: Josiane Mills MD;  Location: [facility] OR;  Service: OB/GYN;  Laterality: N/A;    PARTIAL  HYSTERECTOMY  12/2019    Matheus/Ashlie, fibroid, heavy bleeding - still has ovaries    THYROIDECTOMY, PARTIAL Right 2015    Sadiq, solid right nodule - benign on biopsy when removed    WISDOM TOOTH EXTRACTION       Review of patient's allergies indicates:  No Known Allergies  Current Outpatient Medications on File Prior to Visit   Medication Sig Dispense Refill    lamoTRIgine (LAMICTAL) 100 MG tablet Take 1 tablet (100 mg total) by mouth once daily. 90 tablet 0    levothyroxine (SYNTHROID) 100 MCG tablet Take 1 tablet (100 mcg total) by mouth before breakfast. 90 tablet 3    propranoloL (INDERAL) 10 MG tablet Take 1 tablet (10 mg total) by mouth 3 (three) times daily as needed (anxiety). 90 tablet 0    tacrolimus (PROTOPIC) 0.1 % ointment Apply topically 2 (two) times daily. 60 g 3    buPROPion (WELLBUTRIN XL) 300 MG 24 hr tablet Take 1 tablet (300 mg total) by mouth once daily. 90 tablet 0    clobetasol 0.05% (TEMOVATE) 0.05 % Oint Apply topically 2 (two) times daily. (Patient taking differently: Apply topically 2 (two) times daily as needed.) 30 g 2    estradioL (ESTRACE) 1 MG tablet Take 1 tablet (1 mg total) by mouth once daily. 30 tablet 11    nystatin-triamcinolone (MYCOLOG) ointment Apply topically 2 (two) times daily. (Patient not taking: Reported on 8/31/2024) 30 g 3    triamcinolone acetonide 0.1% (KENALOG) 0.1 % cream Apply topically 2 (two) times daily. (Patient taking differently: Apply topically 2 (two) times daily as needed.) 80 g 2     No current facility-administered medications on file prior to visit.     Family History   Problem Relation Name Age of Onset    Diabetes type II Mother      Diabetes Mother      Hyperlipidemia Father      Heart disease Father  73        AFIB    Hypertension Father      Colon polyps Father      Colon cancer Father      Cancer Father  60        colon cancer; stomach cancer    Breast cancer Paternal Aunt      Hypertension Paternal Aunt      Diabetes Paternal Aunt       Cancer Paternal Aunt      Cancer Paternal Uncle      Diabetes Paternal Uncle      Hypertension Paternal Uncle      Breast cancer Maternal Grandmother      Dementia Maternal Grandmother      Asthma Maternal Grandmother      Vision loss Maternal Grandmother      Arrhythmia Maternal Grandfather      Cancer Maternal Grandfather      Ovarian cancer Neg Hx      Cardiomyopathy Neg Hx      Congenital heart disease Neg Hx      Early death Neg Hx      Heart attacks under age 50 Neg Hx      Pacemaker/defibrilator Neg Hx         Medications Ordered                [facility]  [place] - [address]   [address], [place] [address]    Telephone: [phone]   Fax: [phone]   Hours: Not open 24 hours                         E-Prescribed (1 of 1)              ofloxacin (OCUFLOX) 0.3 % ophthalmic solution    Sig: Place 2 drops into the left eye 4 (four) times daily. for 5 days       Start: 8/31/24     Quantity: 5 mL Refills: 0                           Ohs Peq Odvv Intake    8/31/2024 12:21 PM CDT - Filed by Patient   What is your current physical address in the event of a medical emergency? [address] [facility] [place] [address]   Are you able to take your vital signs? No   Please attach any relevant images or files          Patient has nasal congestion sinus pressure clear nasal discharge for 5 days, no fever, no facial swelling or redness, 3 days ago started to have right eye burning sensation irritation, in the morning has discharge h, no eye pain, she is wearing the contact, no injury, patient did a home COVID get tested negative   No history of sinus surgery         Constitution: Negative for fever.   HENT:  Positive for congestion, postnasal drip and sinus pressure. Negative for sinus pain.    Eyes:  Positive for eye discharge. Negative for eye trauma, foreign body in eye, eye pain, photophobia, vision loss, double vision and blurred vision.        Objective:    The physical exam was conducted virtually.  Physical Exam   Constitutional: She is oriented to person, place, and time.   HENT:   Head: Normocephalic and atraumatic.   Eyes: Right eye exhibits no exudate. Right conjunctiva is injected. Right conjunctiva has no hemorrhage. Extraocular movement intact      Comments:  No facial pain to self  palpation but pressure feeling,    Pulmonary/Chest: Effort normal. No respiratory distress.   Abdominal: Normal appearance.   Neurological: no focal deficit. She is alert and oriented to person, place, and time.   Skin: Skin is no rash.   Psychiatric: Mood, judgment and thought content normal.       Assessment:     1. Conjunctivitis, unspecified conjunctivitis type, unspecified laterality    2. Upper respiratory tract infection, unspecified type        Plan:     Upper respiratory infection more likely viral etiology, continue OTC Claritin Flonase and Mucinex, in next 3 to 5 days if symptoms getting worse return  Due to patient is wearing contact, we have prescribed antibiotic drops for 5 days, do not wear contacts for 5 to 7 days

## 2024-09-08 NOTE — TELEPHONE ENCOUNTER
Attempted to contact pt, left voicemail to contact clinic.  Per Dr Jarvis Pt should come in for Nurse visit blood pressure check before determining if induction is appropriate before or on Thursday.   skin

## 2024-09-09 ENCOUNTER — OFFICE VISIT (OUTPATIENT)
Dept: PSYCHIATRY | Facility: CLINIC | Age: 39
End: 2024-09-09
Payer: COMMERCIAL

## 2024-09-09 DIAGNOSIS — F41.1 GAD (GENERALIZED ANXIETY DISORDER): ICD-10-CM

## 2024-09-09 DIAGNOSIS — F33.1 MDD (MAJOR DEPRESSIVE DISORDER), RECURRENT EPISODE, MODERATE: Primary | ICD-10-CM

## 2024-09-09 PROCEDURE — 90837 PSYTX W PT 60 MINUTES: CPT | Mod: 95,,, | Performed by: PSYCHOLOGIST

## 2024-09-09 NOTE — PROGRESS NOTES
Individual Psychotherapy (PhD/LCSW)    9/9/2024    Therapeutic Intervention: Met with patient.  Outpatient - Behavior modifying psychotherapy 60 min - CPT code 92906    The patient location is: Patient's home/ Patient reported that her location at the time of this visit was in the [facility]     Visit type: Virtual visit with synchronous audio and video     Each patient to whom he or she provides medical services by telemedicine is: (1) informed of the relationship between the physician and patient and the respective role of any other health care provider with respect to management of the patient; and (2) notified that he or she may decline to receive medical services by telemedicine and may withdraw from such care at any time.     Chief complaint/reason for encounter: depression and anxiety     Interval history and content of current session: Patient presented casually dressed, alert, and oriented.  Patient reported experiencing depressed mood, insomnia, fatigue, poor concentration, altered libido, tearfulness, and social isolation, excessive anxiety/worry, restlessness/keyed up, irritability, and panic attacks.  Patient denied current suicidal and homicidal ideations.  Explored source of patient's depressed mood.  Patient discussed feeling overwhelmed by issues related to family, personal safety, health, and employment, among other things.  Active listening skills were used as patient described having difficulty sharing her concerns with her  since they only have a limited time when he is not away working.  Assisted patient in exploring her expectations of her .  Educated patient about using assertive communication skills to address her concerns and feelings with her .  Modeled the use of assertive communication.     Treatment plan:  Target symptoms: depression, anxiety   Why chosen therapy is appropriate versus another modality: relevant to diagnosis  Outcome monitoring methods:  self-report  Therapeutic intervention type: insight oriented psychotherapy, behavior modifying psychotherapy    Risk parameters:  Patient reports no suicidal ideation  Patient reports no homicidal ideation  Patient reports no self-injurious behavior  Patient reports no violent behavior    Verbal deficits: None    Patient's response to intervention:  The patient's response to intervention is accepting.    Progress toward goals and other mental status changes:  The patient's progress toward goals is fair .    Diagnosis:     ICD-10-CM ICD-9-CM   1. MDD (major depressive disorder), recurrent episode, moderate  F33.1 296.32   2. ANASTASIYA (generalized anxiety disorder)  F41.1 300.02       Plan:  individual psychotherapy and consult psychiatrist for medication evaluation    Return to clinic: 2 weeks    Length of Service (minutes): 60           no

## 2024-09-17 DIAGNOSIS — F33.41 RECURRENT MAJOR DEPRESSIVE DISORDER, IN PARTIAL REMISSION: ICD-10-CM

## 2024-09-17 DIAGNOSIS — F41.1 GENERALIZED ANXIETY DISORDER: ICD-10-CM

## 2024-09-17 RX ORDER — BUPROPION HYDROCHLORIDE 300 MG/1
300 TABLET ORAL
Qty: 90 TABLET | Refills: 0 | Status: SHIPPED | OUTPATIENT
Start: 2024-09-17

## 2024-09-17 RX ORDER — LAMOTRIGINE 100 MG/1
100 TABLET ORAL
Qty: 90 TABLET | Refills: 0 | Status: SHIPPED | OUTPATIENT
Start: 2024-09-17

## 2024-10-16 ENCOUNTER — OFFICE VISIT (OUTPATIENT)
Dept: PSYCHIATRY | Facility: CLINIC | Age: 39
End: 2024-10-16
Payer: COMMERCIAL

## 2024-10-16 DIAGNOSIS — F33.1 MDD (MAJOR DEPRESSIVE DISORDER), RECURRENT EPISODE, MODERATE: Primary | ICD-10-CM

## 2024-10-16 DIAGNOSIS — F41.1 GAD (GENERALIZED ANXIETY DISORDER): ICD-10-CM

## 2024-10-16 PROCEDURE — 90837 PSYTX W PT 60 MINUTES: CPT | Mod: 95,,, | Performed by: PSYCHOLOGIST

## 2024-10-16 NOTE — PROGRESS NOTES
Individual Psychotherapy (PhD/LCSW)    10/16/2024    Therapeutic Intervention: Met with patient.  Outpatient - Behavior modifying psychotherapy 60 min - CPT code 39993    The patient location is: Patient's home/ Patient reported that her location at the time of this visit was in the [facility]     Visit type: Virtual visit with synchronous audio and video     Each patient to whom he or she provides medical services by telemedicine is: (1) informed of the relationship between the physician and patient and the respective role of any other health care provider with respect to management of the patient; and (2) notified that he or she may decline to receive medical services by telemedicine and may withdraw from such care at any time.     Chief complaint/reason for encounter: depression and anxiety     Interval history and content of current session: Patient presented casually dressed, alert, and oriented.  Patient reported experiencing depressed mood, insomnia, fatigue, poor concentration, altered libido, tearfulness, and social isolation, excessive anxiety/worry, restlessness/keyed up, irritability, and panic attacks.  Patient denied current suicidal and homicidal ideations.  Explored source of patient's depressed mood.  Active listening skills were used as patient described her tendency to try to control things in order to decrease her anxiety.  Discussed how patient's tendency for control hinders her from asking others for help.  Explored ways for her to prioritize her own wellbeing.  Patient was taught behavioral coping strategies such as physical exercise, participating in activities that she enjoys, meditation, sharing of feelings, and increased assertiveness as ways to reduce feelings of anxiety and depression.                         Treatment plan:  Target symptoms: depression, anxiety   Why chosen therapy is appropriate versus another modality: relevant to diagnosis  Outcome monitoring methods:  self-report  Therapeutic intervention type: insight oriented psychotherapy, behavior modifying psychotherapy    Risk parameters:  Patient reports no suicidal ideation  Patient reports no homicidal ideation  Patient reports no self-injurious behavior  Patient reports no violent behavior    Verbal deficits: None    Patient's response to intervention:  The patient's response to intervention is accepting.    Progress toward goals and other mental status changes:  The patient's progress toward goals is fair .    Diagnosis:     ICD-10-CM ICD-9-CM   1. MDD (major depressive disorder), recurrent episode, moderate  F33.1 296.32   2. ANASTASIYA (generalized anxiety disorder)  F41.1 300.02       Plan:  individual psychotherapy and consult psychiatrist for medication evaluation    Return to clinic: 2 weeks    Length of Service (minutes): 60

## 2024-11-01 ENCOUNTER — OFFICE VISIT (OUTPATIENT)
Dept: URGENT CARE | Facility: CLINIC | Age: 39
End: 2024-11-01
Payer: COMMERCIAL

## 2024-11-01 VITALS
SYSTOLIC BLOOD PRESSURE: 127 MMHG | BODY MASS INDEX: 30.26 KG/M2 | RESPIRATION RATE: 20 BRPM | HEART RATE: 73 BPM | OXYGEN SATURATION: 99 % | WEIGHT: 160.25 LBS | TEMPERATURE: 99 F | DIASTOLIC BLOOD PRESSURE: 84 MMHG | HEIGHT: 61 IN

## 2024-11-01 DIAGNOSIS — J06.9 VIRAL URI WITH COUGH: Primary | ICD-10-CM

## 2024-11-01 DIAGNOSIS — R49.0 HOARSENESS OF VOICE: ICD-10-CM

## 2024-11-01 DIAGNOSIS — R05.9 COUGH, UNSPECIFIED TYPE: ICD-10-CM

## 2024-11-01 DIAGNOSIS — J31.0 RHINITIS, UNSPECIFIED TYPE: ICD-10-CM

## 2024-11-01 LAB
CTP QC/QA: YES
SARS-COV-2 AG RESP QL IA.RAPID: NEGATIVE

## 2024-11-01 RX ORDER — PROMETHAZINE HYDROCHLORIDE AND DEXTROMETHORPHAN HYDROBROMIDE 6.25; 15 MG/5ML; MG/5ML
5 SYRUP ORAL NIGHTLY PRN
Qty: 180 ML | Refills: 0 | Status: SHIPPED | OUTPATIENT
Start: 2024-11-01 | End: 2024-12-07

## 2024-11-01 RX ORDER — DOXYCYCLINE 100 MG/1
1 TABLET ORAL 2 TIMES DAILY
COMMUNITY
Start: 2024-09-10

## 2024-11-01 RX ORDER — IPRATROPIUM BROMIDE 21 UG/1
2 SPRAY, METERED NASAL 2 TIMES DAILY
Qty: 15.8 ML | Refills: 0 | Status: SHIPPED | OUTPATIENT
Start: 2024-11-01 | End: 2024-12-01

## 2024-11-01 RX ORDER — TOBRAMYCIN AND DEXAMETHASONE 3; 1 MG/ML; MG/ML
SUSPENSION/ DROPS OPHTHALMIC
COMMUNITY
Start: 2024-09-10

## 2024-11-01 RX ORDER — PREDNISONE 20 MG/1
20 TABLET ORAL DAILY
Qty: 4 TABLET | Refills: 0 | Status: SHIPPED | OUTPATIENT
Start: 2024-11-01 | End: 2024-11-05

## 2024-11-01 RX ORDER — TINIDAZOLE 500 MG/1
500 TABLET ORAL DAILY
COMMUNITY
Start: 2024-08-03

## 2024-11-01 RX ORDER — FLUCONAZOLE 150 MG/1
150 TABLET ORAL
COMMUNITY
Start: 2024-10-15

## 2024-11-06 ENCOUNTER — TELEPHONE (OUTPATIENT)
Dept: PSYCHIATRY | Facility: CLINIC | Age: 39
End: 2024-11-06
Payer: COMMERCIAL

## 2024-11-14 ENCOUNTER — E-VISIT (OUTPATIENT)
Dept: FAMILY MEDICINE | Facility: CLINIC | Age: 39
End: 2024-11-14
Payer: COMMERCIAL

## 2024-11-14 DIAGNOSIS — B37.31 VAGINAL YEAST INFECTION: Primary | ICD-10-CM

## 2024-11-14 RX ORDER — FLUCONAZOLE 150 MG/1
150 TABLET ORAL EVERY OTHER DAY
Qty: 2 TABLET | Refills: 0 | Status: SHIPPED | OUTPATIENT
Start: 2024-11-14

## 2024-11-14 NOTE — PROGRESS NOTES
Patient ID: Deneen Borrego is a 39 y.o. female.    Chief Complaint: General Illness (Entered automatically based on patient selection in [employer].)    The patient initiated a request through [employer] on 11/14/2024 for evaluation and management with a chief complaint of General Illness (Entered automatically based on patient selection in [employer].)     I evaluated the questionnaire submission on 11/14/24.    [facility]    11/14/2024 11:47 AM CST - Filed by Patient   What do you need help with? Vaginal Symptoms   Do you agree to participate in an E-Visit? Yes   If you have any of the following symptoms,  please do not complete an E-Visit,  schedule an appointment with your provider: I acknowledge   Select all that apply: None of the above   What is the main issue you would like addressed today? Yeast Infection   Which of the following vaginal concerns do you have? Itching   Do you have vaginal discharge? No discharge   Do you have pain while passing urine? No   Do you have any of the following symptoms? None of the above    Have you taken antibiotics in the last two weeks? No    Do you use any of the following? No   Which of the following applies to your menstrual period? Have not had for a while   Which of the following applies to your menstrual cycle? No bleeding   Do you have spotting between periods? No   Do you have pain with your period? No   Have you had similar symptoms in the past? Frequently   When you had similar symptoms in the past, did any of the following work? Pills for yeast infection   Have you had a temperature of 100.4 or higher? No   Provide any additional information you feel is important. I was given a round of flucstarr. It cleared for awhile but came back.   Please attach any relevant images or files    Are you able to take your vital signs? No         Encounter Diagnosis   Name Primary?    Vaginal yeast infection Yes        No orders of the defined types were  placed in this encounter.     Medications Ordered This Encounter   Medications    fluconazole (DIFLUCAN) 150 MG Tab     Sig: Take 1 tablet (150 mg total) by mouth every other day.     Dispense:  2 tablet     Refill:  0        No follow-ups on file.      E-Visit Time Tracking:    Day 1 Time (in minutes): 10    Total Time (in minutes): 10

## 2024-11-19 ENCOUNTER — OFFICE VISIT (OUTPATIENT)
Dept: PSYCHIATRY | Facility: CLINIC | Age: 39
End: 2024-11-19
Payer: COMMERCIAL

## 2024-11-19 DIAGNOSIS — F41.1 GAD (GENERALIZED ANXIETY DISORDER): ICD-10-CM

## 2024-11-19 DIAGNOSIS — F33.1 MDD (MAJOR DEPRESSIVE DISORDER), RECURRENT EPISODE, MODERATE: Primary | ICD-10-CM

## 2024-11-19 PROCEDURE — 90834 PSYTX W PT 45 MINUTES: CPT | Mod: 95,,, | Performed by: PSYCHOLOGIST

## 2024-11-19 NOTE — PROGRESS NOTES
Individual Psychotherapy (PhD/LCSW)    11/19/2024    Therapeutic Intervention: Met with patient.  Outpatient - Behavior modifying psychotherapy 45 min - CPT code 61290    The patient location is: Patient's home/ Patient reported that her location at the time of this visit was in the [facility]     Visit type: Virtual visit with synchronous audio and video     Each patient to whom he or she provides medical services by telemedicine is: (1) informed of the relationship between the physician and patient and the respective role of any other health care provider with respect to management of the patient; and (2) notified that he or she may decline to receive medical services by telemedicine and may withdraw from such care at any time.     Chief complaint/reason for encounter: depression and anxiety     Interval history and content of current session: Patient presented casually dressed, alert, and oriented.  Patient reported experiencing depressed mood, insomnia, fatigue, poor concentration, altered libido, tearfulness, and social isolation, excessive anxiety/worry, restlessness/keyed up, irritability, and panic attacks.  Patient denied current suicidal and homicidal ideations.  Explored source of patient's depressed mood.  Patient described that she worries about issues related to family, personal safety, health, and finances, among other things.  Active listening skills were used as patient described concerns about her marriage as she and her  have little time together due to his work schedule and their family obligations.  Assisted patient in processing her feelings around her relationship with her  and explored ways for her to prioritize her relationship.  Patient was taught behavioral coping strategies such as physical exercise, participating in activities that she enjoys, meditation, sharing of feelings, increased social involvement, setting boundaries, writing in a journal, and increased  assertiveness as ways to reduce feelings of anxiety and depression.                         Treatment plan:  Target symptoms: depression, anxiety   Why chosen therapy is appropriate versus another modality: relevant to diagnosis  Outcome monitoring methods: self-report  Therapeutic intervention type: insight oriented psychotherapy, behavior modifying psychotherapy    Risk parameters:  Patient reports no suicidal ideation  Patient reports no homicidal ideation  Patient reports no self-injurious behavior  Patient reports no violent behavior    Verbal deficits: None    Patient's response to intervention:  The patient's response to intervention is accepting.    Progress toward goals and other mental status changes:  The patient's progress toward goals is fair .    Diagnosis:     ICD-10-CM ICD-9-CM   1. MDD (major depressive disorder), recurrent episode, moderate  F33.1 296.32   2. ANASTASIYA (generalized anxiety disorder)  F41.1 300.02       Plan:  individual psychotherapy and consult psychiatrist for medication evaluation    Return to clinic: 2 weeks    Length of Service (minutes): 45

## 2024-11-26 DIAGNOSIS — F41.1 GENERALIZED ANXIETY DISORDER: ICD-10-CM

## 2024-11-26 RX ORDER — PROPRANOLOL HYDROCHLORIDE 10 MG/1
TABLET ORAL
Qty: 90 TABLET | Refills: 0 | Status: SHIPPED | OUTPATIENT
Start: 2024-11-26

## 2024-12-12 ENCOUNTER — E-VISIT (OUTPATIENT)
Dept: FAMILY MEDICINE | Facility: CLINIC | Age: 39
End: 2024-12-12
Payer: COMMERCIAL

## 2024-12-12 DIAGNOSIS — N76.0 ACUTE VAGINITIS: Primary | ICD-10-CM

## 2024-12-12 RX ORDER — FLUCONAZOLE 150 MG/1
150 TABLET ORAL ONCE
Qty: 1 TABLET | Refills: 1 | Status: SHIPPED | OUTPATIENT
Start: 2024-12-12 | End: 2024-12-12

## 2024-12-12 NOTE — PROGRESS NOTES
Patient ID: Deneen Borrego is a 39 y.o. female.    Chief Complaint: General Illness (Entered automatically based on patient selection in [employer].)          274}  The patient initiated a request through [employer] on 12/12/2024 for evaluation and management with a chief complaint of General Illness (Entered automatically based on patient selection in [employer].)     I evaluated the questionnaire submission on 12/12/2024 .    Total Time (in minutes): 6     Ohs Peq Evisit [facility]    12/12/2024 11:12 AM CST - Filed by Patient   What do you need help with? Vaginal Symptoms   Do you agree to participate in an E-Visit? Yes   If you have any of the following symptoms,  please do not complete an E-Visit,  schedule an appointment with your provider: I acknowledge   Do you have any of the following pregnancy-related conditions? None   What is the main issue you would like addressed today? Reoccuring yeast infection   Which of the following vaginal concerns do you have? Discharge;  Itching   Do you have vaginal discharge? White/milky discharge   Do you have pain while passing urine? No   Do you have any of the following symptoms? None of the above    Have you taken antibiotics in the last two weeks? No    Do you use any of the following? No   Which of the following applies to your menstrual period? Have not had for a while   Which of the following applies to your menstrual cycle? No bleeding   Do you have spotting between periods? No   Do you have pain with your period? No   Have you had similar symptoms in the past? Frequently   When you had similar symptoms in the past, did any of the following work? Pills for yeast infection   Have you had a temperature of 100.4 or higher? No   Provide any additional information you feel is important. I had a hysterectomy in Dec 2019. Third yeast infection since June. Was given flucanzone each time. Helped previous two times but come back a few weeks later. No change to  medication taken 2 days ago.   Please attach any relevant images or files    Are you able to take your vital signs? No          Active Problem List with Overview Notes    Diagnosis Date Noted    Postoperative hypothyroidism 11/01/2022     Right thyroidectomy due to nodule that was benign  Currently taking Levothyroxine 100mcg daily - taking on empty stomach in AM before any other food/drink or med.  TSH WNL        Class 2 severe obesity due to excess calories with serious comorbidity and body mass index (BMI) of 38.0 to 38.9 in adult 11/01/2022     Body mass index is 38.45 kg/m².  Working on lifestyle modifications      S/P partial thyroidectomy 11/01/2022     Right thyroidectomy due to nodule that was benign  Currently taking Levothyroxine 100mcg daily - taking on empty stomach in AM before any other food/drink or med.  TSH WNL        Scoliosis of thoracolumbar region due to degenerative disease of spine in adult 04/15/2020    Generalized anxiety disorder 04/15/2020     treated by psychiatry [facility] but does report the cost is getting high so she is seeking care for another psychiatric provider.  Currently taking Wellbutrin  mg daily and Lamictal 100 mg daily  Gave handout on local psychiatric providers in area to call for appointment.         Psoriasis 04/15/2020    Fibromyalgia      Diagnosed 4/2020 by Internal Medicine NP due to polyarthralgia/joint pains.  She has not had a Rheumatology consult.  Not on any medications.  Working on anti-inflammatory diet.      Fibrocystic breast changes     Hx of migraine headaches     Depression, major, recurrent      treated by psychiatry [facility] but does report the cost is getting high so she is seeking care for another psychiatric provider.  Currently taking Wellbutrin  mg daily and Lamictal 100 mg daily  Gave handout on local psychiatric providers in area to call for appointment.         Lichen  sclerosus et atrophicus      followed by GYN MD        Recent Labs Obtained:  Lab Results   Component Value Date    WBC 6.07 12/05/2023    HGB 13.0 12/05/2023    HCT 40.1 12/05/2023    MCV 91 12/05/2023     12/05/2023     12/05/2023    K 4.2 12/05/2023    GLU 98 12/05/2023    CREATININE 0.75 12/05/2023    EGFRNORACEVR >60.0 12/05/2023    HGBA1C 4.5 12/05/2023    TSH 3.170 12/05/2023      Review of patient's allergies indicates:  No Known Allergies    Encounter Diagnosis   Name Primary?    Acute vaginitis Yes        No orders of the defined types were placed in this encounter.     Medications Ordered This Encounter   Medications    fluconazole (DIFLUCAN) 150 MG Tab     Sig: Take 1 tablet (150 mg total) by mouth once. REFILL AND RE-DOSE IF SYMPTOMS RECUR for 1 dose     Dispense:  1 tablet     Refill:  1        E-Visit Time Tracking:    Day 1 Time (in minutes): 6    Total Time (in minutes): 6      274}

## 2024-12-17 ENCOUNTER — TELEPHONE (OUTPATIENT)
Dept: FAMILY MEDICINE | Facility: CLINIC | Age: 39
End: 2024-12-17
Payer: COMMERCIAL

## 2024-12-17 ENCOUNTER — PATIENT MESSAGE (OUTPATIENT)
Dept: FAMILY MEDICINE | Facility: CLINIC | Age: 39
End: 2024-12-17
Payer: COMMERCIAL

## 2024-12-17 DIAGNOSIS — R16.0 LIVER MASS: Primary | ICD-10-CM

## 2024-12-17 NOTE — TELEPHONE ENCOUNTER
Schedule CBC lab on 12/23/24 so that way I have result for her visit on 12/24/24 for hospital follow up

## 2024-12-17 NOTE — TELEPHONE ENCOUNTER
Spoke with patient in regards to message, patient inform me to she need to schedule ER visit because she was seen on yesterday and was told to F/U with her PCP, I schedule F/U for 12/24- I advised patient when she come in for OV I will schedule her labs.

## 2024-12-17 NOTE — TELEPHONE ENCOUNTER
Patient has wellness scheduled in January and wellness labs ordered but not scheduled.  Please set up lab appt a few days before wellness visit.

## 2024-12-24 ENCOUNTER — OFFICE VISIT (OUTPATIENT)
Dept: FAMILY MEDICINE | Facility: CLINIC | Age: 39
End: 2024-12-24
Payer: COMMERCIAL

## 2024-12-24 VITALS
BODY MASS INDEX: 26.25 KG/M2 | DIASTOLIC BLOOD PRESSURE: 64 MMHG | OXYGEN SATURATION: 98 % | HEART RATE: 76 BPM | HEIGHT: 62 IN | SYSTOLIC BLOOD PRESSURE: 110 MMHG | WEIGHT: 142.63 LBS | TEMPERATURE: 98 F

## 2024-12-24 DIAGNOSIS — R16.0 LIVER MASS, RIGHT LOBE: Primary | ICD-10-CM

## 2024-12-24 DIAGNOSIS — K59.00 CONSTIPATION, UNSPECIFIED CONSTIPATION TYPE: ICD-10-CM

## 2024-12-24 DIAGNOSIS — K76.9 LIVER DISEASE, UNSPECIFIED: ICD-10-CM

## 2024-12-24 DIAGNOSIS — Z09 HOSPITAL DISCHARGE FOLLOW-UP: ICD-10-CM

## 2024-12-24 PROCEDURE — 1160F RVW MEDS BY RX/DR IN RCRD: CPT | Mod: CPTII,S$GLB,, | Performed by: NURSE PRACTITIONER

## 2024-12-24 PROCEDURE — 99999 PR PBB SHADOW E&M-EST. PATIENT-LVL IV: CPT | Mod: PBBFAC,,, | Performed by: NURSE PRACTITIONER

## 2024-12-24 PROCEDURE — 3008F BODY MASS INDEX DOCD: CPT | Mod: CPTII,S$GLB,, | Performed by: NURSE PRACTITIONER

## 2024-12-24 PROCEDURE — 99214 OFFICE O/P EST MOD 30 MIN: CPT | Mod: S$GLB,,, | Performed by: NURSE PRACTITIONER

## 2024-12-24 PROCEDURE — 3074F SYST BP LT 130 MM HG: CPT | Mod: CPTII,S$GLB,, | Performed by: NURSE PRACTITIONER

## 2024-12-24 PROCEDURE — 3078F DIAST BP <80 MM HG: CPT | Mod: CPTII,S$GLB,, | Performed by: NURSE PRACTITIONER

## 2024-12-24 PROCEDURE — 1159F MED LIST DOCD IN RCRD: CPT | Mod: CPTII,S$GLB,, | Performed by: NURSE PRACTITIONER

## 2024-12-24 NOTE — PROGRESS NOTES
"Subjective:       Patient ID: Deneen Borrego is a 39 y.o. female.    Chief Complaint: Hospital Follow Up (F/U ER 12/16)        HPI WITH ASSESSMENT AND PLAN OF CARE:      Patient is a 39 year old white female with Recurrent Depression and Anxiety treated by psychiatry, post-surgical hypothyroidism s/p partial right thyroidectomy, Fibromyalgia diagnosed by Internal Medicine NP 4/2020 (no rheumatology evaluation), Lichen Sclerosus/psoriasis followed by GYN MD, obesity and history of migraines that is here today for ER follow up visit.    Constipation/Abdominal Pain  Patient with to the ER on 12/16/2024.    Had fecal impaction/constipation  Brown bomber enema completed in ER -  patient had large bowel movement   Patient sent home with fiber supplement and stool softener and has had normal bowel movements since.    Last bowel movement was on yesterday - normal.  Patient was on weight loss injections generic Zepbound from October 2023-November 2024 but was off of weight loss injections for the past month - that may have contributed to the constipation.  Doing well since ER visit - normal bowel movements and no abdominal pain  ##Incidental finding of an indeterminate mass to liver that needs further imaging - see note below###    Liver Mass  Seen on CT of the abdomen pelvis 12/16/2024  Indeterminate mass in the right hepatic lobe.   Recommend further evaluation with nonemergent MRI for definitive characterization.   MRI ordered.    Vitals:    12/24/24 0709   BP: 110/64   BP Location: Right arm   Patient Position: Sitting   Pulse: 76   Temp: 97.7 °F (36.5 °C)   TempSrc: Temporal   SpO2: 98%   Weight: 64.7 kg (142 lb 10.2 oz)   Height: 5' 2" (1.575 m)         Diagnoses this Encounter:         ICD-10-CM ICD-9-CM   1. Liver mass, right lobe  R16.0 573.8   2. Constipation, unspecified constipation type  K59.00 564.00   3. Hospital discharge follow-up  Z09 V67.59   4. Liver disease, unspecified  K76.9 573.9       Orders " Placed This Encounter    MRI Abdomen W WO Contrast        Follow up for pending liver imaging results.     Patient's Medications   New Prescriptions    No medications on file   Previous Medications    BUPROPION (WELLBUTRIN XL) 300 MG 24 HR TABLET    TAKE ONE TABLET BY MOUTH EVERY DAY    ESTRADIOL (ESTRACE) 1 MG TABLET    Take 1 tablet (1 mg total) by mouth once daily.    FLUCONAZOLE (DIFLUCAN) 150 MG TAB    Take 1 tablet (150 mg total) by mouth every other day.    LAMOTRIGINE (LAMICTAL) 100 MG TABLET    TAKE ONE TABLET BY MOUTH EVERY DAY    LEVOTHYROXINE (SYNTHROID) 100 MCG TABLET    Take 1 tablet (100 mcg total) by mouth before breakfast.    NYSTATIN-TRIAMCINOLONE (MYCOLOG) OINTMENT    Apply topically 2 (two) times daily.    PROPRANOLOL (INDERAL) 10 MG TABLET    TAKE ONE TABLET BY MOUTH THREE TIMES A DAY AS NEEDED FOR FOR ANXIETY    TACROLIMUS (PROTOPIC) 0.1 % OINTMENT    Apply topically 2 (two) times daily.    TRIAMCINOLONE ACETONIDE 0.1% (KENALOG) 0.1 % CREAM    Apply topically 2 (two) times daily.   Modified Medications    No medications on file   Discontinued Medications    No medications on file         Review of Systems   HENT: Negative.     Respiratory: Negative.     Cardiovascular: Negative.    Gastrointestinal: Negative.          Objective:        Physical Exam  Constitutional:       General: She is not in acute distress.     Appearance: Normal appearance. She is not toxic-appearing or diaphoretic.      Comments: Body mass index is 26.09 kg/m².     Cardiovascular:      Rate and Rhythm: Normal rate and regular rhythm.   Pulmonary:      Effort: Pulmonary effort is normal. No respiratory distress.   Abdominal:      General: Abdomen is flat. Bowel sounds are normal. There is no distension.      Palpations: Abdomen is soft. There is no mass.      Tenderness: There is no abdominal tenderness. There is no guarding.   Neurological:      Mental Status: She is alert and oriented to person, place, and time.    Psychiatric:         Mood and Affect: Mood normal.         Behavior: Behavior normal.             Past Medical History:   Diagnosis Date    Acute right lumbar radiculopathy 2019    Cervicalgia of aiqkynwf-rrqgibs-pqsxa region     right worse than left; right handed    COVID-19 2021    Depression, major, recurrent     [facility] [place], first  closet encinas, suicidal    Episode of recurrent major depressive disorder 04/15/2020    Fibrocystic breast changes     Fibromyalgia     Generalized anxiety disorder 04/15/2020    Hx of migraine headaches     Hypothyroidism, adult     TPO negative    LUCY (iron deficiency anemia)     Iron deficiency anemia, unspecified 04/15/2020    Lichen sclerosus et atrophicus     Morbid obesity 04/15/2020    Obesity, Class III, BMI 40-49.9 (morbid obesity)     max weight 291    Postoperative hypothyroidism 2022    Prediabetes     Psoriasis 04/15/2020    Psoriasis     Scoliosis of thoracolumbar region due to degenerative disease of spine in adult 04/15/2020    Severe anxiety with panic        Past Surgical History:   Procedure Laterality Date     SECTION N/A 2019    Procedure: REPEAT  SECTION;  Surgeon: Miko Albert MD;  Location: [facility] OR;  Service: OB/GYN;  Laterality: N/A;     SECTION  2008    [place]    LAPAROSCOPIC CHOLECYSTECTOMY      [place]    LAPAROSCOPIC SALPINGECTOMY Bilateral 2019    Procedure: SALPINGECTOMY, LAPAROSCOPIC;  Surgeon: Josiane Mills MD;  Location: [facility] OR;  Service: OB/GYN;  Laterality: Bilateral;    LAPAROSCOPIC TOTAL HYSTERECTOMY N/A 2019    Procedure: HYSTERECTOMY, TOTAL, LAPAROSCOPIC;  Surgeon: Josiane Mills MD;  Location: [facility] OR;  Service: OB/GYN;  Laterality: N/A;    PARTIAL HYSTERECTOMY  2019    Shelly, fibroid, heavy bleeding - still has ovaries    THYROIDECTOMY, PARTIAL Right     Sadiq, solid right nodule - benign on biopsy when removed    WISDOM TOOTH  EXTRACTION         Family History   Problem Relation Name Age of Onset    Diabetes type II Mother      Diabetes Mother      Hyperlipidemia Father      Heart disease Father  73        AFIB    Hypertension Father      Colon polyps Father      Colon cancer Father      Cancer Father  60        colon cancer; stomach cancer    Breast cancer Paternal Aunt      Hypertension Paternal Aunt      Diabetes Paternal Aunt      Cancer Paternal Aunt      Cancer Paternal Uncle      Diabetes Paternal Uncle      Hypertension Paternal Uncle      Breast cancer Maternal Grandmother      Dementia Maternal Grandmother      Asthma Maternal Grandmother      Vision loss Maternal Grandmother      Arrhythmia Maternal Grandfather      Cancer Maternal Grandfather      Ovarian cancer Neg Hx      Cardiomyopathy Neg Hx      Congenital heart disease Neg Hx      Early death Neg Hx      Heart attacks under age 50 Neg Hx      Pacemaker/defibrilator Neg Hx         Social History     Socioeconomic History    Marital status:      Spouse name: Go osullivan    Number of children: 3    Highest education level: Bachelor's degree (e.g., BA, AB, BS)   Occupational History    Occupation: Lead Advocate supervisor     Comment: [employer]    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      Social Drivers of Health     Financial Resource Strain: Low Risk  (2024)    Overall Financial Resource Strain (CARDIA)     Difficulty of Paying Living Expenses: Not hard at all   Food Insecurity: No Food Insecurity (2024)    Hunger Vital Sign     Worried About Running Out  of Food in the Last Year: Never true     Ran Out of Food in the Last Year: Never true   Physical Activity: Sufficiently Active (5/28/2024)    Exercise Vital Sign     Days of Exercise per Week: 4 days     Minutes of Exercise per Session: 60 min   Stress: Stress Concern Present (5/28/2024)    [employer] - Occupational Stress Questionnaire     Feeling of Stress : To some extent   Housing Stability: Unknown (5/28/2024)    Housing Stability Vital Sign     Unable to Pay for Housing in the Last Year: No

## 2025-01-03 ENCOUNTER — HOSPITAL ENCOUNTER (OUTPATIENT)
Dept: RADIOLOGY | Facility: HOSPITAL | Age: 40
Discharge: HOME | End: 2025-01-03
Attending: NURSE PRACTITIONER
Payer: COMMERCIAL

## 2025-01-03 ENCOUNTER — OFFICE VISIT (OUTPATIENT)
Dept: OBSTETRICS AND GYNECOLOGY | Facility: CLINIC | Age: 40
End: 2025-01-03
Payer: COMMERCIAL

## 2025-01-03 VITALS
HEART RATE: 90 BPM | WEIGHT: 142.63 LBS | DIASTOLIC BLOOD PRESSURE: 74 MMHG | SYSTOLIC BLOOD PRESSURE: 112 MMHG | BODY MASS INDEX: 26.25 KG/M2 | HEIGHT: 62 IN

## 2025-01-03 DIAGNOSIS — N89.8 VAGINAL DISCHARGE: Primary | ICD-10-CM

## 2025-01-03 DIAGNOSIS — R16.0 LIVER MASS, RIGHT LOBE: ICD-10-CM

## 2025-01-03 DIAGNOSIS — F33.41 RECURRENT MAJOR DEPRESSIVE DISORDER, IN PARTIAL REMISSION: ICD-10-CM

## 2025-01-03 DIAGNOSIS — N76.0 VULVOVAGINITIS: ICD-10-CM

## 2025-01-03 DIAGNOSIS — F41.1 GENERALIZED ANXIETY DISORDER: ICD-10-CM

## 2025-01-03 PROCEDURE — 74183 MRI ABD W/O CNTR FLWD CNTR: CPT | Mod: 26,,, | Performed by: RADIOLOGY

## 2025-01-03 PROCEDURE — 99999 PR PBB SHADOW E&M-EST. PATIENT-LVL III: CPT | Mod: PBBFAC,,, | Performed by: OBSTETRICS & GYNECOLOGY

## 2025-01-03 PROCEDURE — A9585 GADOBUTROL INJECTION: HCPCS | Mod: PN | Performed by: NURSE PRACTITIONER

## 2025-01-03 PROCEDURE — 74183 MRI ABD W/O CNTR FLWD CNTR: CPT | Mod: TC,PN

## 2025-01-03 PROCEDURE — 25500020 PHARM REV CODE 255: Mod: PN | Performed by: NURSE PRACTITIONER

## 2025-01-03 RX ORDER — ESTRADIOL 0.1 MG/G
1 CREAM VAGINAL DAILY
Qty: 42.5 G | Refills: 6 | Status: SHIPPED | OUTPATIENT
Start: 2025-01-03 | End: 2026-01-03

## 2025-01-03 RX ORDER — LAMOTRIGINE 100 MG/1
100 TABLET ORAL DAILY
Qty: 90 TABLET | Refills: 3 | Status: SHIPPED | OUTPATIENT
Start: 2025-01-03

## 2025-01-03 RX ORDER — BUPROPION HYDROCHLORIDE 300 MG/1
300 TABLET ORAL DAILY
Qty: 90 TABLET | Refills: 3 | Status: SHIPPED | OUTPATIENT
Start: 2025-01-03

## 2025-01-03 RX ORDER — GADOBUTROL 604.72 MG/ML
7 INJECTION INTRAVENOUS
Status: COMPLETED | OUTPATIENT
Start: 2025-01-03 | End: 2025-01-03

## 2025-01-03 RX ADMIN — GADOBUTROL 7 ML: 604.72 INJECTION INTRAVENOUS at 09:01

## 2025-01-03 NOTE — PROGRESS NOTES
Subjective:    Patient ID: Deneen Borrego is a 39 y.o. y.o. female.     Chief Complaint:   Chief Complaint   Patient presents with    Vaginal Discharge    Vulvar Itch       History of Present Illness:  Deneen presents today complaining of chronic but intermittent vulvar irritation and itching. She reports she will take diflucan with resolution of symptoms. She also has a history of lichen sclerosis. Patient requesting refills on her psych meds. She has been stable on them for years.       ROS:   Review of Systems   Constitutional:  Negative for activity change, appetite change, chills, diaphoresis, fatigue, fever and unexpected weight change.   HENT:  Negative for mouth sores and tinnitus.    Eyes:  Negative for discharge and visual disturbance.   Respiratory:  Negative for cough, shortness of breath and wheezing.    Cardiovascular:  Negative for chest pain, palpitations and leg swelling.   Gastrointestinal:  Negative for abdominal pain, bloating, blood in stool, constipation, diarrhea, nausea and vomiting.   Endocrine: Negative for diabetes, hair loss, hot flashes, hyperthyroidism and hypothyroidism.   Genitourinary:  Negative for dysuria, flank pain, frequency, genital sores, hematuria, urgency, vaginal bleeding, vaginal discharge, vaginal pain, postcoital bleeding and vaginal odor.        Vulvar irritation  Vulvar itching   Musculoskeletal:  Negative for back pain, joint swelling and myalgias.   Integumentary:  Negative for rash, acne, breast mass, nipple discharge and breast skin changes.   Neurological:  Negative for seizures, syncope, numbness and headaches.   Hematological:  Negative for adenopathy. Does not bruise/bleed easily.   Psychiatric/Behavioral:  Negative for depression and sleep disturbance. The patient is not nervous/anxious.    Breast: Negative for mass, mastodynia, nipple discharge and skin changes          Objective:    Vital Signs:  Vitals:    01/03/25 1606   BP: 112/74   Pulse: 90        Physical Exam:  General:  alert, cooperative, no distress   Head Normocephalic, without obvious abnormality, atraumatic   Neck .supple, symmetrical, trachea midline   Skin:  Skin color, texture, turgor normal. No rashes or lesions   Abdomen:  soft, non-tender; bowel sounds normal   Pelvis: External genitalia: normal general appearance, hypopigmentation and scarring at vaginal introitus  Urinary system: urethral meatus normal, bladder nontender  Vaginal: atrophic mucosa  Cervix: absent, removed surgically  Uterus: absent, removed surgically     Extremities extremities normal, atraumatic, no cyanosis or edema   Neurologic Grossly normal      Vaginal discharge  -     Vaginosis Screen by DNA Probe; Future; Expected date: 01/03/2025    Generalized anxiety disorder  -     buPROPion (WELLBUTRIN XL) 300 MG 24 hr tablet; Take 1 tablet (300 mg total) by mouth once daily.  Dispense: 90 tablet; Refill: 3  -     lamoTRIgine (LAMICTAL) 100 MG tablet; Take 1 tablet (100 mg total) by mouth once daily.  Dispense: 90 tablet; Refill: 3    Recurrent major depressive disorder, in partial remission  -     buPROPion (WELLBUTRIN XL) 300 MG 24 hr tablet; Take 1 tablet (300 mg total) by mouth once daily.  Dispense: 90 tablet; Refill: 3  -     lamoTRIgine (LAMICTAL) 100 MG tablet; Take 1 tablet (100 mg total) by mouth once daily.  Dispense: 90 tablet; Refill: 3    Vulvovaginitis    Other orders  -     estradioL (ESTRACE) 0.01 % (0.1 mg/gram) vaginal cream; Place 1 g vaginally once daily.  Dispense: 42.5 g; Refill: 6

## 2025-01-04 LAB
BACTERIAL VAGINOSIS DNA: NOT DETECTED
CANDIDA GLABRATA/KRUSEI: NOT DETECTED
CANDIDA RRNA VAG QL PROBE: NOT DETECTED
TRICHOMONAS VAGINALIS: NOT DETECTED

## 2025-01-09 ENCOUNTER — HOSPITAL ENCOUNTER (OUTPATIENT)
Dept: RADIOLOGY | Facility: HOSPITAL | Age: 40
Discharge: HOME | End: 2025-01-09
Attending: NURSE PRACTITIONER
Payer: COMMERCIAL

## 2025-01-09 DIAGNOSIS — R16.0 LIVER MASS, RIGHT LOBE: ICD-10-CM

## 2025-01-09 PROCEDURE — 74183 MRI ABD W/O CNTR FLWD CNTR: CPT | Mod: 26,,, | Performed by: RADIOLOGY

## 2025-01-09 PROCEDURE — 25500020 PHARM REV CODE 255: Mod: PN | Performed by: NURSE PRACTITIONER

## 2025-01-09 PROCEDURE — 74183 MRI ABD W/O CNTR FLWD CNTR: CPT | Mod: TC,PN

## 2025-01-09 PROCEDURE — A9581 GADOXETATE DISODIUM INJ: HCPCS | Mod: PN | Performed by: NURSE PRACTITIONER

## 2025-01-09 RX ADMIN — GADOXETATE DISODIUM 7 ML: 181.43 INJECTION, SOLUTION INTRAVENOUS at 11:01

## 2025-01-10 PROBLEM — K76.89 FOCAL NODULAR HYPERPLASIA OF LIVER: Status: ACTIVE | Noted: 2025-01-10

## 2025-01-10 NOTE — PROGRESS NOTES
Please advise patient that her MRI confirmed that liver lesion is a focal nodular hyperplasia - this is a BENIGN condition of the liver that has NO malignant potential.  It is rarely symptomatic and surgical intervention is almost never required.

## 2025-01-14 ENCOUNTER — OFFICE VISIT (OUTPATIENT)
Dept: FAMILY MEDICINE | Facility: CLINIC | Age: 40
End: 2025-01-14
Payer: COMMERCIAL

## 2025-01-14 VITALS
HEART RATE: 74 BPM | TEMPERATURE: 98 F | WEIGHT: 146.81 LBS | DIASTOLIC BLOOD PRESSURE: 70 MMHG | SYSTOLIC BLOOD PRESSURE: 110 MMHG | OXYGEN SATURATION: 99 % | BODY MASS INDEX: 26.01 KG/M2 | HEIGHT: 63 IN

## 2025-01-14 DIAGNOSIS — F41.1 GENERALIZED ANXIETY DISORDER: ICD-10-CM

## 2025-01-14 DIAGNOSIS — L90.0 LICHEN SCLEROSUS ET ATROPHICUS: ICD-10-CM

## 2025-01-14 DIAGNOSIS — Z86.39 HISTORY OF OBESITY: ICD-10-CM

## 2025-01-14 DIAGNOSIS — Z23 FLU VACCINE NEED: ICD-10-CM

## 2025-01-14 DIAGNOSIS — F33.41 RECURRENT MAJOR DEPRESSIVE DISORDER, IN PARTIAL REMISSION: ICD-10-CM

## 2025-01-14 DIAGNOSIS — K76.89 FOCAL NODULAR HYPERPLASIA OF LIVER: ICD-10-CM

## 2025-01-14 DIAGNOSIS — L40.9 PSORIASIS: ICD-10-CM

## 2025-01-14 DIAGNOSIS — E89.0 POSTOPERATIVE HYPOTHYROIDISM: ICD-10-CM

## 2025-01-14 DIAGNOSIS — M79.7 FIBROMYALGIA: ICD-10-CM

## 2025-01-14 DIAGNOSIS — E89.0 S/P PARTIAL THYROIDECTOMY: ICD-10-CM

## 2025-01-14 DIAGNOSIS — Z23 NEED FOR STREPTOCOCCUS PNEUMONIAE VACCINATION: ICD-10-CM

## 2025-01-14 DIAGNOSIS — Z00.00 ANNUAL PHYSICAL EXAM: Primary | ICD-10-CM

## 2025-01-14 PROCEDURE — 90677 PCV20 VACCINE IM: CPT | Mod: S$GLB,,, | Performed by: NURSE PRACTITIONER

## 2025-01-14 PROCEDURE — 90656 IIV3 VACC NO PRSV 0.5 ML IM: CPT | Mod: S$GLB,,, | Performed by: NURSE PRACTITIONER

## 2025-01-14 PROCEDURE — 3078F DIAST BP <80 MM HG: CPT | Mod: CPTII,S$GLB,, | Performed by: NURSE PRACTITIONER

## 2025-01-14 PROCEDURE — 1159F MED LIST DOCD IN RCRD: CPT | Mod: CPTII,S$GLB,, | Performed by: NURSE PRACTITIONER

## 2025-01-14 PROCEDURE — 1160F RVW MEDS BY RX/DR IN RCRD: CPT | Mod: CPTII,S$GLB,, | Performed by: NURSE PRACTITIONER

## 2025-01-14 PROCEDURE — 3008F BODY MASS INDEX DOCD: CPT | Mod: CPTII,S$GLB,, | Performed by: NURSE PRACTITIONER

## 2025-01-14 PROCEDURE — 3074F SYST BP LT 130 MM HG: CPT | Mod: CPTII,S$GLB,, | Performed by: NURSE PRACTITIONER

## 2025-01-14 PROCEDURE — 90471 IMMUNIZATION ADMIN: CPT | Mod: S$GLB,,, | Performed by: NURSE PRACTITIONER

## 2025-01-14 PROCEDURE — 99999 PR PBB SHADOW E&M-EST. PATIENT-LVL IV: CPT | Mod: PBBFAC,,, | Performed by: NURSE PRACTITIONER

## 2025-01-14 PROCEDURE — 90472 IMMUNIZATION ADMIN EACH ADD: CPT | Mod: S$GLB,,, | Performed by: NURSE PRACTITIONER

## 2025-01-14 PROCEDURE — 99396 PREV VISIT EST AGE 40-64: CPT | Mod: 25,S$GLB,, | Performed by: NURSE PRACTITIONER

## 2025-01-14 NOTE — PROGRESS NOTES
Subjective:       Patient ID: Deneen Borrego is a 39 y.o. female.    Chief Complaint: Annual Exam        HPI WITH ASSESSMENT AND PLAN OF CARE:      Patient is a 39 year old white female with Recurrent Depression and Anxiety treated by psychiatry, post-surgical hypothyroidism s/p partial right thyroidectomy, Fibromyalgia diagnosed by Internal Medicine NP 4/2020 (no rheumatology evaluation), Lichen Sclerosus/psoriasis followed by GYN MD, obesity, history of migraines, and  Focal Nodular Hyperplasia of Liver on MRI that is here today for ANNUAL PHYSICAL EXAM with fasting lab results - only CBC and CMP done, will send today for thyroid, A1C, and lipid panel.  Biometric Screening form to be filled out.      Focal nodular hyperplasia of liver   January 2025:  MRI confirmed that liver lesion is a focal nodular hyperplasia - this is a BENIGN condition of the liver that has NO malignant potential.  It is rarely symptomatic and surgical intervention is almost never required.      Recurrent Depression and Anxiety   treated by [facility] VALERIA [facility] - last visit 7/10/2024  Treated by [facility] - last visit 11/19/2024  Currently taking Wellbutrin  mg daily and Lamictal 100 mg daily  Prescribed Propranolol 10 mg TID prn anxiety  Stable.        Post-surgical hypothyroidism s/p partial right thyroidectomy  Right thyroidectomy due to nodule that was benign  Currently taking Levothyroxine 100mcg daily - taking on empty stomach in AM before any other food/drink or med.  TSH pending - will have drawn today             Fibromyalgia   diagnosed by Internal Medicine NP 4/2020 (no rheumatology evaluation)  Not on any medications.  Working on anti-inflammatory diet.  Stable.     Lichen Sclerosus/psoriasis   followed by GYN MD       History of Obesity  Body mass index is 26.34 kg/m².  Was on Tirzepatide compounded injections August 2023 - October 2024  Reports weight loss of 140 pounds total in past 3 years  with diet and injections, 70 pounds was while on injections.  Keeping weight off since stopped medications.  Working on lifestyle modifications       Wellness labs:  CBC WNL  CMP okay  TSH, A1C and lipid panel were not drawn - will send today for fasting labs.     Health Maintenance:  Flu vaccine today  Pneumonia vaccine today  Mammogram will be scheduled.    Lab Visit on 01/13/2025   Component Date Value Ref Range Status    WBC 01/13/2025 5.62  3.90 - 12.70 K/uL Final    RBC 01/13/2025 4.17  4.00 - 5.40 M/uL Final    Hemoglobin 01/13/2025 12.5  12.0 - 16.0 g/dL Final    Hematocrit 01/13/2025 37.7  37.0 - 48.5 % Final    MCV 01/13/2025 90  82 - 98 fL Final    MCH 01/13/2025 30.0  27.0 - 31.0 pg Final    MCHC 01/13/2025 33.2  32.0 - 36.0 g/dL Final    RDW 01/13/2025 12.7  11.5 - 14.5 % Final    Platelets 01/13/2025 202  150 - 450 K/uL Final    MPV 01/13/2025 11.0  9.2 - 12.9 fL Final    Immature Granulocytes 01/13/2025 0.4  0.0 - 0.5 % Final    Gran # (ANC) 01/13/2025 3.4  1.8 - 7.7 K/uL Final    Immature Grans (Abs) 01/13/2025 0.02  0.00 - 0.04 K/uL Final    Comment: Mild elevation in immature granulocytes is non specific and   can be seen in a variety of conditions including stress response,   acute inflammation, trauma and pregnancy. Correlation with other   laboratory and clinical findings is essential.      Lymph # 01/13/2025 1.6  1.0 - 4.8 K/uL Final    Mono # 01/13/2025 0.4  0.3 - 1.0 K/uL Final    Eos # 01/13/2025 0.1  0.0 - 0.5 K/uL Final    Baso # 01/13/2025 0.12  0.00 - 0.20 K/uL Final    nRBC 01/13/2025 0  0 /100 WBC Final    Gran % 01/13/2025 61.2  38.0 - 73.0 % Final    Lymph % 01/13/2025 27.6  18.0 - 48.0 % Final    Mono % 01/13/2025 7.5  4.0 - 15.0 % Final    Eosinophil % 01/13/2025 1.6  0.0 - 8.0 % Final    Basophil % 01/13/2025 2.1 (H)  0.0 - 1.9 % Final    Differential Method 01/13/2025 Automated   Final    Sodium 01/13/2025 141  136 - 145 mmol/L Final    Potassium 01/13/2025 3.6  3.5 - 5.1 mmol/L  Final    Chloride 01/13/2025 108  95 - 110 mmol/L Final    CO2 01/13/2025 24  23 - 29 mmol/L Final    Glucose 01/13/2025 81  70 - 110 mg/dL Final    BUN 01/13/2025 18  6 - 20 mg/dL Final    Creatinine 01/13/2025 0.8  0.5 - 1.4 mg/dL Final    Calcium 01/13/2025 9.2  8.7 - 10.5 mg/dL Final    Total Protein 01/13/2025 7.0  6.0 - 8.4 g/dL Final    Albumin 01/13/2025 4.0  3.5 - 5.2 g/dL Final    Total Bilirubin 01/13/2025 0.8  0.1 - 1.0 mg/dL Final    Comment: For infants and newborns, interpretation of results should be based  on gestational age, weight and in agreement with clinical  observations.    Premature Infant recommended reference ranges:  Up to 24 hours.............<8.0 mg/dL  Up to 48 hours............<12.0 mg/dL  3-5 days..................<15.0 mg/dL  6-29 days.................<15.0 mg/dL      Alkaline Phosphatase 01/13/2025 55  40 - 150 U/L Final    AST 01/13/2025 10  10 - 40 U/L Final    ALT 01/13/2025 10  10 - 44 U/L Final    eGFR 01/13/2025 >60.0  >60 mL/min/1.73 m^2 Final    Anion Gap 01/13/2025 9  8 - 16 mmol/L Final   Office Visit on 01/03/2025   Component Date Value Ref Range Status    Bacterial vaginosis DNA 01/03/2025 Not Detected  Not Detected Final    Candida sp 01/03/2025 Not Detected  Negative Final    Comment: Eileen species group includes: Candida albicans, Candida tropicalis,  Candida parapsiolosis, Eielen dubliniensis      Eileen glabrata/krusei 01/03/2025 Not Detected  Not Detected Final    Trichomonas vaginalis 01/03/2025 Not Detected  Not Detected Final   Lab Visit on 12/23/2024   Component Date Value Ref Range Status    WBC 12/23/2024 5.95  3.90 - 12.70 K/uL Final    RBC 12/23/2024 4.29  4.00 - 5.40 M/uL Final    Hemoglobin 12/23/2024 12.8  12.0 - 16.0 g/dL Final    Hematocrit 12/23/2024 38.7  37.0 - 48.5 % Final    MCV 12/23/2024 90  82 - 98 fL Final    MCH 12/23/2024 29.8  27.0 - 31.0 pg Final    MCHC 12/23/2024 33.1  32.0 - 36.0 g/dL Final    RDW 12/23/2024 11.9  11.5 - 14.5 %  Final    Platelets 12/23/2024 242  150 - 450 K/uL Final    MPV 12/23/2024 11.1  9.2 - 12.9 fL Final    Immature Granulocytes 12/23/2024 0.2  0.0 - 0.5 % Final    Gran # (ANC) 12/23/2024 3.2  1.8 - 7.7 K/uL Final    Immature Grans (Abs) 12/23/2024 0.01  0.00 - 0.04 K/uL Final    Comment: Mild elevation in immature granulocytes is non specific and   can be seen in a variety of conditions including stress response,   acute inflammation, trauma and pregnancy. Correlation with other   laboratory and clinical findings is essential.      Lymph # 12/23/2024 2.1  1.0 - 4.8 K/uL Final    Mono # 12/23/2024 0.4  0.3 - 1.0 K/uL Final    Eos # 12/23/2024 0.2  0.0 - 0.5 K/uL Final    Baso # 12/23/2024 0.10  0.00 - 0.20 K/uL Final    nRBC 12/23/2024 0  0 /100 WBC Final    Gran % 12/23/2024 53.4  38.0 - 73.0 % Final    Lymph % 12/23/2024 35.3  18.0 - 48.0 % Final    Mono % 12/23/2024 7.1  4.0 - 15.0 % Final    Eosinophil % 12/23/2024 2.5  0.0 - 8.0 % Final    Basophil % 12/23/2024 1.7  0.0 - 1.9 % Final    Differential Method 12/23/2024 Automated   Final   Admission on 12/16/2024, Discharged on 12/17/2024   Component Date Value Ref Range Status    WBC 12/16/2024 13.14 (H)  3.90 - 12.70 K/uL Final    RBC 12/16/2024 4.37  4.00 - 5.40 M/uL Final    Hemoglobin 12/16/2024 13.4  12.0 - 16.0 g/dL Final    Hematocrit 12/16/2024 38.3  37.0 - 48.5 % Final    MCV 12/16/2024 88  82 - 98 fL Final    MCH 12/16/2024 30.7  27.0 - 31.0 pg Final    MCHC 12/16/2024 35.0  32.0 - 36.0 g/dL Final    RDW 12/16/2024 12.0  11.5 - 14.5 % Final    Platelets 12/16/2024 268  150 - 450 K/uL Final    MPV 12/16/2024 11.0  9.2 - 12.9 fL Final    Immature Granulocytes 12/16/2024 0.2  0.0 - 0.5 % Final    Gran # (ANC) 12/16/2024 11.0 (H)  1.8 - 7.7 K/uL Final    Immature Grans (Abs) 12/16/2024 0.03  0.00 - 0.04 K/uL Final    Comment: Mild elevation in immature granulocytes is non specific and   can be seen in a variety of conditions including stress response,   acute  inflammation, trauma and pregnancy. Correlation with other   laboratory and clinical findings is essential.      Lymph # 12/16/2024 1.5  1.0 - 4.8 K/uL Final    Mono # 12/16/2024 0.4  0.3 - 1.0 K/uL Final    Eos # 12/16/2024 0.1  0.0 - 0.5 K/uL Final    Baso # 12/16/2024 0.10  0.00 - 0.20 K/uL Final    nRBC 12/16/2024 0  0 /100 WBC Final    Gran % 12/16/2024 83.8 (H)  38.0 - 73.0 % Final    Lymph % 12/16/2024 11.4 (L)  18.0 - 48.0 % Final    Mono % 12/16/2024 3.3 (L)  4.0 - 15.0 % Final    Eosinophil % 12/16/2024 0.7  0.0 - 8.0 % Final    Basophil % 12/16/2024 0.8  0.0 - 1.9 % Final    Differential Method 12/16/2024 Automated   Final    Sodium 12/16/2024 139  136 - 145 mmol/L Final    Potassium 12/16/2024 3.7  3.5 - 5.1 mmol/L Final    Chloride 12/16/2024 103  95 - 110 mmol/L Final    CO2 12/16/2024 26  23 - 29 mmol/L Final    Glucose 12/16/2024 85  70 - 110 mg/dL Final    BUN 12/16/2024 14  6 - 20 mg/dL Final    Creatinine 12/16/2024 1.0  0.5 - 1.4 mg/dL Final    Calcium 12/16/2024 9.9  8.7 - 10.5 mg/dL Final    Total Protein 12/16/2024 7.5  6.0 - 8.4 g/dL Final    Albumin 12/16/2024 4.4  3.5 - 5.2 g/dL Final    Total Bilirubin 12/16/2024 0.9  0.1 - 1.0 mg/dL Final    Comment: For infants and newborns, interpretation of results should be based  on gestational age, weight and in agreement with clinical  observations.    Premature Infant recommended reference ranges:  Up to 24 hours.............<8.0 mg/dL  Up to 48 hours............<12.0 mg/dL  3-5 days..................<15.0 mg/dL  6-29 days.................<15.0 mg/dL      Alkaline Phosphatase 12/16/2024 60  40 - 150 U/L Final    AST 12/16/2024 13  10 - 40 U/L Final    ALT 12/16/2024 12  10 - 44 U/L Final    eGFR 12/16/2024 >60.0  >60 mL/min/1.73 m^2 Final    Anion Gap 12/16/2024 10  8 - 16 mmol/L Final    Lipase 12/16/2024 24  4 - 60 U/L Final       Vitals:    01/14/25 0738   BP: 110/70   BP Location: Right arm   Patient Position: Sitting   Pulse: 74   Temp: 97.7  "°F (36.5 °C)   TempSrc: Temporal   SpO2: 99%   Weight: 66.6 kg (146 lb 13.2 oz)   Height: 5' 2.6" (1.59 m)         Diagnoses this Encounter:         ICD-10-CM ICD-9-CM   1. Annual physical exam  Z00.00 V70.0   2. Recurrent major depressive disorder, in partial remission  F33.41 296.35   3. Generalized anxiety disorder  F41.1 300.02   4. Postoperative hypothyroidism  E89.0 244.0   5. S/P partial thyroidectomy  E89.0 246.8   6. Psoriasis  L40.9 696.1   7. Focal nodular hyperplasia of liver  K76.89 573.8   8. Lichen sclerosus et atrophicus  L90.0 701.0   9. Fibromyalgia  M79.7 729.1   10. History of obesity  Z86.39 V12.29   11. BMI 26.0-26.9,adult  Z68.26 V85.22   12. Flu vaccine need  Z23 V04.81   13. Need for Streptococcus pneumoniae vaccination  Z23 V03.82       Orders Placed This Encounter    influenza (Flulaval, Fluzone, Fluarix) 45 mcg/0.5 mL IM vaccine (> or = 6 mo) 0.5 mL    pneumoc 20-daina conj-dip cr(PF) (PREVNAR-20 (PF)) injection Syrg 0.5 mL        Follow up for pending lab results today - if all good - follow up in 1 year..     Patient's Medications   New Prescriptions    No medications on file   Previous Medications    BUPROPION (WELLBUTRIN XL) 300 MG 24 HR TABLET    Take 1 tablet (300 mg total) by mouth once daily.    ESTRADIOL (ESTRACE) 0.01 % (0.1 MG/GRAM) VAGINAL CREAM    Place 1 g vaginally once daily.    ESTRADIOL (ESTRACE) 1 MG TABLET    Take 1 tablet (1 mg total) by mouth once daily.    LAMOTRIGINE (LAMICTAL) 100 MG TABLET    Take 1 tablet (100 mg total) by mouth once daily.    LEVOTHYROXINE (SYNTHROID) 100 MCG TABLET    Take 1 tablet (100 mcg total) by mouth before breakfast.    NYSTATIN-TRIAMCINOLONE (MYCOLOG) OINTMENT    Apply topically 2 (two) times daily.    PROPRANOLOL (INDERAL) 10 MG TABLET    TAKE ONE TABLET BY MOUTH THREE TIMES A DAY AS NEEDED FOR FOR ANXIETY    TACROLIMUS (PROTOPIC) 0.1 % OINTMENT    Apply topically 2 (two) times daily.    TRIAMCINOLONE ACETONIDE 0.1% (KENALOG) 0.1 % " CREAM    Apply topically 2 (two) times daily.   Modified Medications    No medications on file   Discontinued Medications    FLUCONAZOLE (DIFLUCAN) 150 MG TAB    Take 1 tablet (150 mg total) by mouth every other day.         Review of Systems   HENT: Negative.     Respiratory: Negative.     Cardiovascular: Negative.    Gastrointestinal: Negative.          Objective:        Physical Exam  Constitutional:       General: She is not in acute distress.     Appearance: Normal appearance. She is obese. She is not ill-appearing, toxic-appearing or diaphoretic.      Comments: Body mass index is 26.34 kg/m².         HENT:      Head: Normocephalic and atraumatic.      Right Ear: Tympanic membrane, ear canal and external ear normal. There is no impacted cerumen.      Left Ear: Tympanic membrane, ear canal and external ear normal. There is no impacted cerumen.      Nose: No congestion or rhinorrhea.      Mouth/Throat:      Pharynx: No oropharyngeal exudate.   Eyes:      General:         Right eye: No discharge.         Left eye: No discharge.      Extraocular Movements: Extraocular movements intact.      Conjunctiva/sclera: Conjunctivae normal.   Cardiovascular:      Rate and Rhythm: Normal rate and regular rhythm.      Heart sounds: Normal heart sounds. No murmur heard.  Pulmonary:      Effort: Pulmonary effort is normal. No respiratory distress.      Breath sounds: Normal breath sounds. No stridor. No wheezing, rhonchi or rales.   Abdominal:      General: Bowel sounds are normal. There is no distension.      Palpations: Abdomen is soft.      Tenderness: There is no abdominal tenderness. There is no guarding.   Musculoskeletal:         General: Normal range of motion.      Cervical back: Normal range of motion.      Right lower leg: No edema.      Left lower leg: No edema.   Lymphadenopathy:      Cervical: No cervical adenopathy.   Skin:     General: Skin is warm and dry.      Findings: No rash.   Neurological:      Mental  Status: She is alert and oriented to person, place, and time.   Psychiatric:         Mood and Affect: Mood normal.         Behavior: Behavior normal.         Thought Content: Thought content normal.         Judgment: Judgment normal.             Past Medical History:   Diagnosis Date    Acute right lumbar radiculopathy 2019    Cervicalgia of [id number]-[id number]-ltykt region     right worse than left; right handed    COVID-19 2021    Depression, major, recurrent     [facility] [place], first  closet encinas, suicidal    Episode of recurrent major depressive disorder 04/15/2020    Fibrocystic breast changes     Fibromyalgia     Focal nodular hyperplasia of liver 01/10/2025    MRI confirmed that liver lesion is a focal nodular hyperplasia - this is a BENIGN condition of the liver that has NO malignant potential.  It is rarely symptomatic and surgical intervention is almost never required.      Generalized anxiety disorder 04/15/2020    Hx of migraine headaches     Hypothyroidism, adult     TPO negative    LUCY (iron deficiency anemia)     Iron deficiency anemia, unspecified 04/15/2020    Lichen sclerosus et atrophicus     Morbid obesity 04/15/2020    Obesity, Class III, BMI 40-49.9 (morbid obesity)     max weight 291    Postoperative hypothyroidism 2022    Prediabetes     Psoriasis 04/15/2020    Psoriasis     Scoliosis of thoracolumbar region due to degenerative disease of spine in adult 04/15/2020    Severe anxiety with panic        Past Surgical History:   Procedure Laterality Date     SECTION N/A 2019    Procedure: REPEAT  SECTION;  Surgeon: Miko Albert MD;  Location: [facility];  Service: OB/GYN;  Laterality: N/A;     SECTION  2008    [place]    LAPAROSCOPIC CHOLECYSTECTOMY  2013    [place]    LAPAROSCOPIC SALPINGECTOMY Bilateral 2019    Procedure: SALPINGECTOMY, LAPAROSCOPIC;  Surgeon: Josiane Mills MD;  Location: [facility] OR;  Service: OB/GYN;   Laterality: Bilateral;    LAPAROSCOPIC TOTAL HYSTERECTOMY N/A 12/27/2019    Procedure: HYSTERECTOMY, TOTAL, LAPAROSCOPIC;  Surgeon: Josiane Mills MD;  Location: [facility] OR;  Service: OB/GYN;  Laterality: N/A;    PARTIAL HYSTERECTOMY  12/2019    Shelly, fibroid, heavy bleeding - still has ovaries    THYROIDECTOMY, PARTIAL Right 2015    Sadiq, solid right nodule - benign on biopsy when removed    WISDOM TOOTH EXTRACTION         Family History   Problem Relation Name Age of Onset    Diabetes type II Mother      Diabetes Mother      Hyperlipidemia Father      Heart disease Father  73        AFIB    Hypertension Father      Colon polyps Father      Colon cancer Father      Cancer Father  60        colon cancer; stomach cancer    Breast cancer Paternal Aunt      Hypertension Paternal Aunt      Diabetes Paternal Aunt      Cancer Paternal Aunt      Cancer Paternal Uncle      Diabetes Paternal Uncle      Hypertension Paternal Uncle      Breast cancer Maternal Grandmother      Dementia Maternal Grandmother      Asthma Maternal Grandmother      Vision loss Maternal Grandmother      Arrhythmia Maternal Grandfather      Cancer Maternal Grandfather      Ovarian cancer Neg Hx      Cardiomyopathy Neg Hx      Congenital heart disease Neg Hx      Early death Neg Hx      Heart attacks under age 50 Neg Hx      Pacemaker/defibrilator Neg Hx         Social History     Socioeconomic History    Marital status:      Spouse name: Go osullivan    Number of children: 3    Highest education level: Bachelor's degree (e.g., BA, AB, BS)   Occupational History    Occupation: Lead Advocate supervisor     Comment: [employer]    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      Social Drivers of Health     Financial Resource Strain: Low Risk  (2024)    Overall Financial Resource Strain (CARDIA)     Difficulty of Paying Living Expenses: Not hard at all   Food Insecurity: No Food Insecurity (2024)    Hunger Vital Sign     Worried About Running Out of Food in the Last Year: Never true     Ran Out of Food in the Last Year: Never true   Physical Activity: Sufficiently Active (2024)    Exercise Vital Sign     Days of Exercise per Week: 4 days     Minutes of Exercise per Session: 60 min   Stress: Stress Concern Present (2024)    [place] [employer] - Occupational Stress Questionnaire     Feeling of Stress : To some extent   Housing Stability: Unknown (2024)    Housing Stability Vital Sign     Unable to Pay for Housing in the Last Year: No

## 2025-01-16 ENCOUNTER — TELEPHONE (OUTPATIENT)
Dept: FAMILY MEDICINE | Facility: CLINIC | Age: 40
End: 2025-01-16
Payer: COMMERCIAL

## 2025-01-18 ENCOUNTER — PATIENT MESSAGE (OUTPATIENT)
Dept: FAMILY MEDICINE | Facility: CLINIC | Age: 40
End: 2025-01-18
Payer: COMMERCIAL

## 2025-01-18 DIAGNOSIS — E89.0 POSTOPERATIVE HYPOTHYROIDISM: ICD-10-CM

## 2025-01-21 RX ORDER — LEVOTHYROXINE SODIUM 100 UG/1
100 TABLET ORAL
Qty: 90 TABLET | Refills: 3 | Status: SHIPPED | OUTPATIENT
Start: 2025-01-21

## 2025-01-27 ENCOUNTER — HOSPITAL ENCOUNTER (OUTPATIENT)
Dept: RADIOLOGY | Facility: HOSPITAL | Age: 40
Discharge: HOME | End: 2025-01-27
Attending: OBSTETRICS & GYNECOLOGY
Payer: COMMERCIAL

## 2025-01-27 DIAGNOSIS — Z12.31 ENCOUNTER FOR SCREENING MAMMOGRAM FOR MALIGNANT NEOPLASM OF BREAST: ICD-10-CM

## 2025-01-27 PROCEDURE — 77067 SCR MAMMO BI INCL CAD: CPT | Mod: 26,,, | Performed by: RADIOLOGY

## 2025-01-27 PROCEDURE — 77067 SCR MAMMO BI INCL CAD: CPT | Mod: TC

## 2025-01-27 PROCEDURE — 77063 BREAST TOMOSYNTHESIS BI: CPT | Mod: 26,,, | Performed by: RADIOLOGY

## 2025-01-28 ENCOUNTER — ON-DEMAND VIRTUAL (OUTPATIENT)
Dept: URGENT CARE | Facility: CLINIC | Age: 40
End: 2025-01-28
Payer: COMMERCIAL

## 2025-01-28 DIAGNOSIS — N39.0 URINARY TRACT INFECTION WITHOUT HEMATURIA, SITE UNSPECIFIED: Primary | ICD-10-CM

## 2025-01-28 RX ORDER — NITROFURANTOIN 25; 75 MG/1; MG/1
100 CAPSULE ORAL 2 TIMES DAILY
Qty: 14 CAPSULE | Refills: 0 | Status: SHIPPED | OUTPATIENT
Start: 2025-01-28 | End: 2025-02-04

## 2025-01-29 NOTE — PROGRESS NOTES
Subjective:      Patient ID: Deneen Borrego is a 39 y.o. female.    Vitals:  vitals were not taken for this visit.     Chief Complaint: Urinary Tract Infection (Dysuria began yesterday but worse today.  No fever, no n/v, no hematuria)  Two patient identifiers were used-name was repeated verbally as well as date of birth.  The patient was located in their home in the [facility].      Visit Type: TELE AUDIOVISUAL    Past Medical History:   Diagnosis Date    Acute right lumbar radiculopathy 2019    Cervicalgia of hujhfqlm-ixombjt-epvka region     right worse than left; right handed    COVID-19 2021    Depression, major, recurrent     [facility], first  closet encinas, suicidal    Episode of recurrent major depressive disorder 04/15/2020    Fibrocystic breast changes     Fibromyalgia     Focal nodular hyperplasia of liver 01/10/2025    MRI confirmed that liver lesion is a focal nodular hyperplasia - this is a BENIGN condition of the liver that has NO malignant potential.  It is rarely symptomatic and surgical intervention is almost never required.      Generalized anxiety disorder 04/15/2020    Hx of migraine headaches     Hypothyroidism, adult     TPO negative    LUCY (iron deficiency anemia)     Iron deficiency anemia, unspecified 04/15/2020    Lichen sclerosus et atrophicus     Morbid obesity 04/15/2020    Obesity, Class III, BMI 40-49.9 (morbid obesity)     max weight 291    Postoperative hypothyroidism 2022    Prediabetes     Psoriasis 04/15/2020    Psoriasis     Scoliosis of thoracolumbar region due to degenerative disease of spine in adult 04/15/2020    Severe anxiety with panic      Past Surgical History:   Procedure Laterality Date     SECTION N/A 2019    Procedure: REPEAT  SECTION;  Surgeon: Miko Albert MD;  Location: [facility];  Service: OB/GYN;  Laterality: N/A;     SECTION  2008    [place]    LAPAROSCOPIC CHOLECYSTECTOMY       [place]    LAPAROSCOPIC SALPINGECTOMY Bilateral 12/27/2019    Procedure: SALPINGECTOMY, LAPAROSCOPIC;  Surgeon: Josiane Mills MD;  Location: [facility];  Service: OB/GYN;  Laterality: Bilateral;    LAPAROSCOPIC TOTAL HYSTERECTOMY N/A 12/27/2019    Procedure: HYSTERECTOMY, TOTAL, LAPAROSCOPIC;  Surgeon: Josiane Mills MD;  Location: [facility];  Service: OB/GYN;  Laterality: N/A;    PARTIAL HYSTERECTOMY  12/2019    Shelly, fibroid, heavy bleeding - still has ovaries    THYROIDECTOMY, PARTIAL Right 2015    Sadiq, solid right nodule - benign on biopsy when removed    WISDOM TOOTH EXTRACTION       Review of patient's allergies indicates:  No Known Allergies  Current Outpatient Medications on File Prior to Visit   Medication Sig Dispense Refill    buPROPion (WELLBUTRIN XL) 300 MG 24 hr tablet Take 1 tablet (300 mg total) by mouth once daily. 90 tablet 3    estradioL (ESTRACE) 0.01 % (0.1 mg/gram) vaginal cream Place 1 g vaginally once daily. 42.5 g 6    estradioL (ESTRACE) 1 MG tablet Take 1 tablet (1 mg total) by mouth once daily. 30 tablet 11    lamoTRIgine (LAMICTAL) 100 MG tablet Take 1 tablet (100 mg total) by mouth once daily. 90 tablet 3    levothyroxine (SYNTHROID) 100 MCG tablet Take 1 tablet (100 mcg total) by mouth before breakfast. 90 tablet 3    nystatin-triamcinolone (MYCOLOG) ointment Apply topically 2 (two) times daily. 30 g 3    propranoloL (INDERAL) 10 MG tablet TAKE ONE TABLET BY MOUTH THREE TIMES A DAY AS NEEDED FOR FOR ANXIETY 90 tablet 0    tacrolimus (PROTOPIC) 0.1 % ointment Apply topically 2 (two) times daily. 60 g 3    triamcinolone acetonide 0.1% (KENALOG) 0.1 % cream Apply topically 2 (two) times daily. 80 g 2     No current facility-administered medications on file prior to visit.     Family History   Problem Relation Name Age of Onset    Diabetes type II Mother      Diabetes Mother      Hyperlipidemia Father      Heart disease Father  73        AFIB    Hypertension Father       Colon polyps Father      Colon cancer Father      Cancer Father  60        colon cancer; stomach cancer    Breast cancer Paternal Aunt      Hypertension Paternal Aunt      Diabetes Paternal Aunt      Cancer Paternal Aunt      Cancer Paternal Uncle      Diabetes Paternal Uncle      Hypertension Paternal Uncle      Breast cancer Maternal Grandmother      Dementia Maternal Grandmother      Asthma Maternal Grandmother      Vision loss Maternal Grandmother      Arrhythmia Maternal Grandfather      Cancer Maternal Grandfather      Ovarian cancer Neg Hx      Cardiomyopathy Neg Hx      Congenital heart disease Neg Hx      Early death Neg Hx      Heart attacks under age 50 Neg Hx      Pacemaker/defibrilator Neg Hx         Medications Ordered                [id number] - [place] - [address] [facility]   [address] [place] [address]    Telephone: [phone]   Fax: [phone]   Hours: Not open 24 hours                         E-Prescribed (1 of 1)              nitrofurantoin, macrocrystal-monohydrate, (MACROBID) 100 MG capsule    Sig: Take 1 capsule (100 mg total) by mouth 2 (two) times daily. for 7 days       Start: 1/28/25     Quantity: 14 capsule Refills: 0                           Ohs Peq Odvv Intake    1/28/2025  6:12 PM CST - Filed by Patient   What is your current physical address in the event of a medical emergency? [address] [facility] [address]   Are you able to take your vital signs? No   Please attach any relevant images or files    Is your employer contracted with [facility]? No         HPI     Urinary Tract Infection     Additional comments: Dysuria began yesterday but worse today.  No fever, no n/v, no hematuria  Two patient identifiers were used-name was repeated verbally as well as date of birth.  The patient was located in their home in the [facility].          Constitution: Negative for chills and fever.   HENT:  Negative for sinus pain and sinus pressure.     Neck: Negative for neck pain, neck stiffness and painful lymph nodes.   Cardiovascular:  Negative for chest pain and palpitations.   Respiratory:  Negative for cough and shortness of breath.    Gastrointestinal:  Negative for nausea, vomiting and diarrhea.   Endocrine: cold intolerance and heat intolerance.   Genitourinary:  Positive for dysuria, frequency and urgency. Negative for flank pain and hematuria.   Musculoskeletal:  Negative for joint pain, back pain and muscle cramps.   Skin:  Negative for rash, lesion and hives.   Allergic/Immunologic: Negative for hives and itching.   Neurological:  Negative for dizziness and headaches.   Hematologic/Lymphatic: Negative for swollen lymph nodes and history of bleeding disorder.   Psychiatric/Behavioral:  Negative for nervous/anxious and sleep disturbance. The patient is not nervous/anxious.         Objective:   The physical exam was conducted virtually.  Physical Exam   Constitutional: She is oriented to person, place, and time. No distress.   HENT:   Head: Normocephalic and atraumatic.   Neck: Neck supple.   Pulmonary/Chest: Effort normal. No respiratory distress.   Abdominal: Normal appearance.   Musculoskeletal: Normal range of motion.         General: Normal range of motion.   Neurological: no focal deficit. She is alert and oriented to person, place, and time.   Skin: Skin is not pale.   Psychiatric: Her behavior is normal. Mood, judgment and thought content normal.       Assessment:     1. Urinary tract infection without hematuria, site unspecified        Plan:       Urinary tract infection without hematuria, site unspecified    Other orders  -     nitrofurantoin, macrocrystal-monohydrate, (MACROBID) 100 MG capsule; Take 1 capsule (100 mg total) by mouth 2 (two) times daily. for 7 days  Dispense: 14 capsule; Refill: 0      Increase intake of fluids; avoid caffeine.  Meds as directed.  F/u with PCP or to ER if symptoms become worse.    You must understand that  you've received a virtual Care treatment only and that you may be released before all your medical problems are known or treated. You, the patient, will arrange for follow up care as instructed.  If your condition worsens we recommend that you receive another evaluation at an urgent care in person, the emergency room or contact your primary medical clinics after hours call service to discuss your concerns.            Present with the patient at the time of consultation: TELEMED PRESENT WITH PATIENT: None

## 2025-02-12 ENCOUNTER — PATIENT MESSAGE (OUTPATIENT)
Dept: OBSTETRICS AND GYNECOLOGY | Facility: CLINIC | Age: 40
End: 2025-02-12
Payer: COMMERCIAL

## 2025-02-12 ENCOUNTER — E-VISIT (OUTPATIENT)
Dept: FAMILY MEDICINE | Facility: CLINIC | Age: 40
End: 2025-02-12
Payer: COMMERCIAL

## 2025-02-12 DIAGNOSIS — N89.8 VAGINAL DISCHARGE: Primary | ICD-10-CM

## 2025-02-12 RX ORDER — FLUCONAZOLE 150 MG/1
150 TABLET ORAL
Qty: 2 TABLET | Refills: 0 | Status: SHIPPED | OUTPATIENT
Start: 2025-02-12 | End: 2025-02-16

## 2025-02-12 NOTE — PROGRESS NOTES
Patient ID: Deneen Borrego is a 39 y.o. female.    Chief Complaint: General Illness (Entered automatically based on patient selection in [employer].)    The patient initiated a request through [employer] on 2/12/2025 for evaluation and management with a chief complaint of General Illness (Entered automatically based on patient selection in [employer].)     I evaluated the questionnaire submission on 2/12/2025.    [facility]    2/12/2025 10:39 AM CST - Filed by Patient   What do you need help with? Vaginal Symptoms   Do you agree to participate in an E-Visit? Yes   If you have any of the following symptoms,  please do not complete an E-Visit,  schedule an appointment with your provider: I acknowledge   Do you have any of the following pregnancy-related conditions? None   What is the main issue you would like addressed today? Yeast Infection   Which of the following vaginal concerns do you have? Discharge;  Itching   Do you have vaginal discharge? White/milky discharge   Do you have pain while passing urine? No   Do you have any of the following symptoms? None of the above    Have you taken antibiotics in the last two weeks? No    Do you use any of the following? No   Which of the following applies to your menstrual period? Have not had for a while   Which of the following applies to your menstrual cycle? No bleeding   Do you have spotting between periods? No   Do you have pain with your period? No   Have you had similar symptoms in the past? Frequently   When you had similar symptoms in the past, did any of the following work? Pills for yeast infection   Have you had a temperature of 100.4 or higher? No   Provide any additional information you feel is important. Reoccuring yeast infection   Please attach any relevant images or files    Are you able to take your vital signs? No         Encounter Diagnosis   Name Primary?    Vaginal discharge Yes        No orders of the defined types were placed  in this encounter.     Medications Ordered This Encounter   Medications    fluconazole (DIFLUCAN) 150 MG Tab     Sig: Take 1 tablet (150 mg total) by mouth every 72 hours. for 2 doses     Dispense:  2 tablet     Refill:  0        No follow-ups on file.      E-Visit Time Tracking:    Day 1 Time (in minutes): 5    Total Time (in minutes): 5

## 2025-02-17 NOTE — TELEPHONE ENCOUNTER
Seen last month for this and vaginosis screen was negative. Only other vaginosis screen I see was in 2019 and was also negative. Please advise.

## 2025-03-09 ENCOUNTER — E-VISIT (OUTPATIENT)
Dept: FAMILY MEDICINE | Facility: CLINIC | Age: 40
End: 2025-03-09
Payer: COMMERCIAL

## 2025-03-09 DIAGNOSIS — R05.2 SUBACUTE COUGH: Primary | ICD-10-CM

## 2025-03-09 DIAGNOSIS — J01.00 SUBACUTE MAXILLARY SINUSITIS: ICD-10-CM

## 2025-03-09 DIAGNOSIS — H92.09 OTALGIA, UNSPECIFIED LATERALITY: ICD-10-CM

## 2025-03-09 RX ORDER — NAPROXEN 500 MG/1
500 TABLET ORAL 2 TIMES DAILY PRN
Qty: 10 TABLET | Refills: 0 | Status: SHIPPED | OUTPATIENT
Start: 2025-03-09 | End: 2025-03-14

## 2025-03-09 RX ORDER — PROMETHAZINE HYDROCHLORIDE AND DEXTROMETHORPHAN HYDROBROMIDE 6.25; 15 MG/5ML; MG/5ML
5 SYRUP ORAL EVERY 6 HOURS PRN
Qty: 118 ML | Refills: 1 | Status: SHIPPED | OUTPATIENT
Start: 2025-03-09 | End: 2025-03-19

## 2025-03-09 RX ORDER — DESLORATADINE 5 MG/1
5 TABLET ORAL DAILY
Qty: 7 TABLET | Refills: 0 | Status: SHIPPED | OUTPATIENT
Start: 2025-03-09 | End: 2025-03-16

## 2025-03-09 RX ORDER — AMOXICILLIN AND CLAVULANATE POTASSIUM 875; 125 MG/1; MG/1
1 TABLET, FILM COATED ORAL 2 TIMES DAILY
Qty: 14 TABLET | Refills: 0 | Status: SHIPPED | OUTPATIENT
Start: 2025-03-09 | End: 2025-03-16

## 2025-03-09 NOTE — PROGRESS NOTES
Patient ID: Deneen Borrego is a 39 y.o. female.    Chief Complaint: General Illness (Entered automatically based on patient selection in [employer].)          274}  The patient initiated a request through [employer] on 3/9/2025 for evaluation and management with a chief complaint of General Illness (Entered automatically based on patient selection in [employer].)     I evaluated the questionnaire submission on 03/09/2025 .    Total Time (in minutes): 13     Ohs Peq Evisit Supergroup-Cough And Cold    3/9/2025  2:29 PM CDT - Filed by Patient   What do you need help with? Cough, Cold, Sore Throat   Do you agree to participate in an E-Visit? Yes   If you have any of the following symptoms, go to your local emergency room or call 911: I acknowledge   Do you have any of the following pregnancy-related conditions? None   What is the main issue you would like addressed today? Cold symptoms and right ear pain   Do you think you might have COVID-19 or the Flu? No   Have you tested positive for COVID-19 or Flu? No   What symptoms do you currently have?  Cough;  Stuffy nose;  Sore throat;  Ear pain   Describe your cough: Contains mucus;  Comes and goes   Describe your mucus: Green;  Yellow   Have you had any trouble with your breathing, swollowing, or vision? None   Have you ever smoked? Never smoked   Have you had a fever? No   When did your symptoms first appear? 3/3/2025   In the last two weeks, have you been in close contact with someone who has COVID-19, the Flu, or strep throat? No   List what you have done or taken to help your symptoms. Over the counter medication   On a scale of 1-10, where 10 is the worst you can imagine, how severe are your symptoms? (range: 1 - 10) 3   Have your symptoms changed since they first started? Worsened   Do you have transportation to an [facility] location to get tested for COVID-19? Yes   Provide any additional information you feel is important. Started with sore throat amd runny nose, then a  cough began and now I have ear pain today.   Please attach any relevant images or files    Are you able to take your vital signs? No          Active Problem List with Overview Notes    Diagnosis Date Noted    BMI 26.0-26.9,adult 01/14/2025    Focal nodular hyperplasia of liver 01/10/2025     MRI confirmed that liver lesion is a focal nodular hyperplasia - this is a BENIGN condition of the liver that has NO malignant potential.  It is rarely symptomatic and surgical intervention is almost never required.      Postoperative hypothyroidism 11/01/2022    History of obesity 11/01/2022    S/P partial thyroidectomy 11/01/2022    Scoliosis of thoracolumbar region due to degenerative disease of spine in adult 04/15/2020    Generalized anxiety disorder 04/15/2020    Psoriasis 04/15/2020    Fibromyalgia     Fibrocystic breast changes     Hx of migraine headaches     Depression, major, recurrent      treated by [facility]       Lichen sclerosus et atrophicus      followed by GYN MD        Recent Labs Obtained:  Lab Results   Component Value Date    WBC 5.62 01/13/2025    HGB 12.5 01/13/2025    HCT 37.7 01/13/2025    MCV 90 01/13/2025     01/13/2025     01/13/2025    K 3.6 01/13/2025    GLU 81 01/13/2025    CREATININE 0.8 01/13/2025    EGFRNORACEVR >60.0 01/13/2025    HGBA1C 4.5 01/14/2025    TSH 1.921 01/14/2025      Review of patient's allergies indicates:  No Known Allergies    Encounter Diagnoses   Name Primary?    Subacute cough Yes    Subacute maxillary sinusitis     Otalgia, unspecified laterality         No orders of the defined types were placed in this encounter.     Medications Ordered This Encounter   Medications    amoxicillin-clavulanate 875-125mg (AUGMENTIN) 875-125 mg per tablet     Sig: Take 1 tablet by mouth 2 (two) times daily. for 7 days     Dispense:  14 tablet     Refill:  0    desloratadine (CLARINEX) 5 mg tablet     Sig: Take 1 tablet (5 mg total) by mouth once daily. for 7 days      Dispense:  7 tablet     Refill:  0    naproxen (NAPROSYN) 500 MG tablet     Sig: Take 1 tablet (500 mg total) by mouth 2 (two) times daily as needed (pain).     Dispense:  10 tablet     Refill:  0    promethazine-dextromethorphan (PROMETHAZINE-DM) 6.25-15 mg/5 mL Syrp     Sig: Take 5 mLs by mouth every 6 (six) hours as needed (cough).     Dispense:  118 mL     Refill:  1        E-Visit Time Tracking:    Day 1 Time (in minutes): 13    Total Time (in minutes): 13      274}

## 2025-03-13 ENCOUNTER — OFFICE VISIT (OUTPATIENT)
Dept: URGENT CARE | Facility: CLINIC | Age: 40
End: 2025-03-13
Payer: COMMERCIAL

## 2025-03-13 VITALS
SYSTOLIC BLOOD PRESSURE: 107 MMHG | OXYGEN SATURATION: 98 % | RESPIRATION RATE: 16 BRPM | WEIGHT: 140 LBS | BODY MASS INDEX: 25.76 KG/M2 | DIASTOLIC BLOOD PRESSURE: 72 MMHG | HEIGHT: 62 IN | TEMPERATURE: 99 F | HEART RATE: 71 BPM

## 2025-03-13 DIAGNOSIS — H65.01 NON-RECURRENT ACUTE SEROUS OTITIS MEDIA OF RIGHT EAR: Primary | ICD-10-CM

## 2025-03-13 PROCEDURE — 99213 OFFICE O/P EST LOW 20 MIN: CPT | Mod: S$GLB,,, | Performed by: FAMILY MEDICINE

## 2025-03-13 RX ORDER — DEXAMETHASONE 4 MG/1
8 TABLET ORAL DAILY
Qty: 6 TABLET | Refills: 0 | Status: SHIPPED | OUTPATIENT
Start: 2025-03-13 | End: 2025-03-16

## 2025-03-13 NOTE — PROGRESS NOTES
"Subjective:      Patient ID: Deneen Borrego is a 39 y.o. female.    Vitals:  height is 5' 2" (1.575 m) and weight is 63.5 kg (140 lb). Her oral temperature is 98.9 °F (37.2 °C). Her blood pressure is 107/72 and her pulse is 71. Her respiration is 16 and oxygen saturation is 98%.     Chief Complaint: Ear Problem (Entered by patient) and Otalgia (Right ear)    39 y.o. female presents with right ear clogging/discomfort  and sinus congestion. Her voice is better.   OTC mucinex taken this am. Virtual visit [place] 3/9/25 via one of [facility].  Patient reports that she is getting better, but she cannot hear out of her right ear.    Otalgia   There is pain in the right ear. This is a recurrent problem. The current episode started in the past 7 days. The problem occurs constantly. The problem has been gradually improving. There has been no fever. The pain is at a severity of 4/10. The pain is mild. Associated symptoms include coughing and hearing loss. She has tried NSAIDs for the symptoms.       HENT:  Positive for ear pain and hearing loss.    Respiratory:  Positive for cough.       Objective:     Physical Exam   Constitutional: She is oriented to person, place, and time. She appears well-developed. She is cooperative.  Non-toxic appearance. She does not appear ill. No distress.   HENT:   Head: Normocephalic and atraumatic.   Ears:   Right Ear: Hearing, external ear and ear canal normal. A middle ear effusion is present.   Left Ear: Hearing, tympanic membrane, external ear and ear canal normal.  No middle ear effusion.   Nose: Nose normal. No mucosal edema, rhinorrhea or nasal deformity. No epistaxis. Right sinus exhibits no maxillary sinus tenderness and no frontal sinus tenderness. Left sinus exhibits no maxillary sinus tenderness and no frontal sinus tenderness.   Mouth/Throat: Uvula is midline, oropharynx is clear and moist and mucous membranes are normal. No trismus in the jaw. Normal dentition. No " uvula swelling. No oropharyngeal exudate, posterior oropharyngeal edema or posterior oropharyngeal erythema.   Eyes: Conjunctivae and lids are normal. No scleral icterus.   Neck: Trachea normal and phonation normal. Neck supple. No edema present. No erythema present. No neck rigidity present.   Cardiovascular: Normal rate, regular rhythm, normal heart sounds and normal pulses.   Pulmonary/Chest: Effort normal and breath sounds normal. No respiratory distress. She has no decreased breath sounds. She has no rhonchi.   Abdominal: Normal appearance.   Musculoskeletal: Normal range of motion.         General: No deformity. Normal range of motion.   Neurological: She is alert and oriented to person, place, and time. She exhibits normal muscle tone. Coordination normal.   Skin: Skin is warm, dry, intact, not diaphoretic and not pale.   Psychiatric: Her speech is normal and behavior is normal. Judgment and thought content normal.   Nursing note and vitals reviewed.      Assessment:     1. Non-recurrent acute serous otitis media of right ear        Plan:       Non-recurrent acute serous otitis media of right ear  -     dexAMETHasone (DECADRON) 4 MG Tab; Take 2 tablets (8 mg total) by mouth once daily. for 3 doses  Dispense: 6 tablet; Refill: 0          Medical Decision Making:   Initial Assessment:   Pt recently treated for sinusitis who now developed serous otitis media.        Thank you for choosing [facility]!     Our goal in the Urgent Care is to always provide outstanding medical care. You may receive a survey by mail or e-mail in the next week regarding your experience today. We would greatly appreciate you completing and returning the survey. Your feedback provides us with a way to recognize our staff who provide very good care, and it helps us learn how to improve when your experience was below our aspiration of excellence.       We appreciate you trusting us with your medical care. We hope you feel better  soon. We will be happy to take care of you for all of your future medical needs.  You must understand that you've received an Urgent Care treatment only and that you may be released before all your medical problems are known or treated. You, the patient, will arrange for follow up care as instructed.  Follow up with your PCP or specialty clinic as directed in the next 1-2 weeks if not improved or as needed.  You can call [phone] to schedule an appointment with the appropriate provider.  Another option is to follow up with [facility] ([email or web address]) virtually for quick simple medical advice.  If your condition worsens we recommend that you receive another evaluation at the emergency room immediately or contact your primary medical clinics after hours call service to discuss your concerns.  Please return here or go to the Emergency Department for any concerns or worsening of condition.      *If you were prescribed a narcotic or controlled medication, do not drive or operate heavy equipment or machinery while taking these medications.

## 2025-03-17 ENCOUNTER — E-VISIT (OUTPATIENT)
Dept: URGENT CARE | Facility: CLINIC | Age: 40
End: 2025-03-17
Payer: COMMERCIAL

## 2025-03-17 DIAGNOSIS — B37.9 YEAST INFECTION: Primary | ICD-10-CM

## 2025-03-17 RX ORDER — FLUCONAZOLE 150 MG/1
150 TABLET ORAL ONCE
Qty: 2 TABLET | Refills: 0 | Status: SHIPPED | OUTPATIENT
Start: 2025-03-17 | End: 2025-03-17

## 2025-03-17 NOTE — PROGRESS NOTES
Patient ID: Deneen Borrego is a 39 y.o. female.    Chief Complaint: General Illness (Entered automatically based on patient selection in [employer].)    The patient initiated a request through [employer] on 3/17/2025 for evaluation and management with a chief complaint of General Illness (Entered automatically based on patient selection in [employer].)     I evaluated the questionnaire submission on 03/17/2025 .    Ohs Peq Evisit Supergroup-Common Problems    3/17/2025  9:28 AM CDT - Filed by Patient   What do you need help with? Other Concern   Do you agree to participate in an E-Visit? Yes   If you have any of the following symptoms, please go to the nearest emergency room or call 911: I acknowledge   Do you have any of the following pregnancy-related conditions? None   What is the main issue you would like addressed today? Medication for yeast infection from antibiotics   Please describe your symptoms. Itching, discharge   Where is your problem located? Vaginal concern   On a scale of 1-10, where 10 is the worst you can imagine, how severe are your symptoms? (range: 1 - 10) 3   Have you had these symptoms before? Yes   How long have you had these symptoms? A few days   What helps with your symptoms? Diflucan medication   What makes your symptoms feel worse? NA   Are your symptoms related to a condition you currently have? No   Please describe any probable cause for your symptoms. Taking antibiotics for ear infection   Provide any additional information you feel is important. NA   Please attach any relevant images or files    Are you able to take your vital signs? No         Encounter Diagnosis   Name Primary?    Yeast infection Yes        No orders of the defined types were placed in this encounter.     Medications Ordered This Encounter   Medications    fluconazole (DIFLUCAN) 150 MG Tab     Sig: Take 1 tablet (150 mg total) by mouth once. Repeat if not resolved in 3 days. for 1 dose     Dispense:  2 tablet     Refill:   0        No follow-ups on file.      E-Visit Time Tracking:    Day 1 Time (in minutes): 5    Total Time (in minutes): 5

## 2025-04-10 ENCOUNTER — OFFICE VISIT (OUTPATIENT)
Dept: PSYCHIATRY | Facility: CLINIC | Age: 40
End: 2025-04-10
Payer: COMMERCIAL

## 2025-04-10 DIAGNOSIS — F33.41 RECURRENT MAJOR DEPRESSIVE DISORDER, IN PARTIAL REMISSION: Primary | ICD-10-CM

## 2025-04-10 DIAGNOSIS — F41.1 GENERALIZED ANXIETY DISORDER: ICD-10-CM

## 2025-04-10 PROCEDURE — 98006 SYNCH AUDIO-VIDEO EST MOD 30: CPT | Mod: 95,,,

## 2025-04-10 PROCEDURE — 3044F HG A1C LEVEL LT 7.0%: CPT | Mod: CPTII,95,,

## 2025-04-10 NOTE — PROGRESS NOTES
"Outpatient Psychiatry Follow-Up Visit (PA)    04/10/2025    Clinical Status of Patient:  Outpatient (Ambulatory)    Chief Complaint:  Deneen Borrego is a 39 y.o. female who presents today for follow-up of depression and anxiety.  Met with patient.     The patient location is: [place], [place] at home  The chief complaint leading to consultation is: depression and anxiety    Visit type: audiovisual    Face to Face time with patient: 6 minutes  15 minutes of total time spent on the encounter, which includes face to face time and non-face to face time preparing to see the patient (eg, review of tests), Obtaining and/or reviewing separately obtained history, Documenting clinical information in the electronic or other health record, Independently interpreting results (not separately reported) and communicating results to the patient/family/caregiver, or Care coordination (not separately reported).     Each patient to whom he or she provides medical services by telemedicine is:  (1) informed of the relationship between the physician and patient and the respective role of any other health care provider with respect to management of the patient; and (2) notified that he or she may decline to receive medical services by telemedicine and may withdraw from such care at any time.      Current Psych Medications:   Wellbutrin  mg PO daily  Lamictal 100 mg PO daily    Interval History and Content of Current Session:  Patient seen and chart reviewed. Last seen on 7/10/2024    Patient has a psychiatric history of: depression and anxiety    Pt reports for follow up today stating that she was able to get her refills with her gynecologist. States that for the most part she is doing well, able to get out of bed and do things. Still seeing counselor twice a month. States that she has some extreme stressors. No other complaints today. "Medications are working".     Mood: overall "okay for the most part"    Anxiety: "probably a " "little more anxious than what I want to be" states she is going through separation and has had increased anxiety over the last 2 months    Pt appears happy and calm    Denies adverse effects from medication    Sleep: Sleep has been good "for the most", sometimes some trouble falling asleep, once she falls asleep it's fine, trying to get her mind to shut down is difficult    Appetite: Appetite is "fine", not over eating as much    Denies SI/HI/AVH.     Pt reports taking medications as prescribed and behaving appropriately during interview today.      Psychotherapy:  Target symptoms: depression, anxiety   Why chosen therapy is appropriate versus another modality: relevant to diagnosis  Outcome monitoring methods: self-report, observation  Therapeutic intervention type: insight oriented psychotherapy, supportive psychotherapy  Topics discussed/themes: difficulty managing affect in interpersonal relationships  The patient's response to the intervention is accepting. The patient's progress toward treatment goals is excellent.   Duration of intervention: 3 minutes.    Review of Systems   PSYCHIATRIC: Pertinant items are noted in the narrative.  CONSTITUTIONAL: No weight gain or loss.   MUSCULOSKELETAL: No pain or stiffness of the joints.  NEUROLOGIC: No weakness, sensory changes, seizures, confusion, memory loss, tremor or other abnormal movements.  ENDOCRINE: No polydipsia or polyuria.  INTEGUMENTARY: No rashes or lacerations.  EYES: No exophthalmos, jaundice or blindness.  ENT: No dizziness, tinnitus or hearing loss.  RESPIRATORY: No shortness of breath.  CARDIOVASCULAR: No tachycardia or chest pain.  GASTROINTESTINAL: No nausea, vomiting, pain, constipation or diarrhea.  GENITOURINARY: No frequency, dysuria or sexual dysfunction.  HEMATOLOGIC/LYMPHATIC: No excessive bleeding, prolonged or excessive bleeding after dental extraction/injury.  ALLERGIC/IMMUNOLOGIC: No allergic response to materials, foods or animals at " this time.    Past Medication Trials:  yes - Cymbalta, Lexapro, Prozac, Zoloft    Past Medical, Family and Social History: The patient's past medical, family and social history have been reviewed and updated as appropriate within the electronic medical record - see encounter notes.    Compliance: yes    Side effects: None    Risk Parameters:  Patient reports no suicidal ideation  Patient reports no homicidal ideation  Patient reports no self-injurious behavior  Patient reports no violent behavior    Exam (detailed: at least 9 elements; comprehensive: all 15 elements)   Constitutional  Vitals:  Most recent vital signs, dated less than 90 days prior to this appointment, were reviewed.   There were no vitals filed for this visit.     General:  unremarkable, age appropriate, casually dressed     Musculoskeletal  Muscle Strength/Tone:  not examined   Gait & Station:  Unable to assess, seated     Psychiatric  Speech:  no latency; no press   Mood & Affect:  steady, happy  congruent and appropriate   Thought Process:  normal and logical   Associations:  intact   Thought Content:  normal, no suicidality, no homicidality, delusions, or paranoia   Insight:  has awareness of illness   Judgement: behavior is adequate to circumstances   Orientation:  grossly intact, person, place, day of week, month of year, year   Memory: intact for content of interview, grossly intact, memory >3 objects at five mins   Language: grossly intact, able to name, able to repeat   Attention Span & Concentration:  able to focus   Fund of Knowledge:  intact and appropriate to age and level of education, familiar with aspects of current personal life     Assessment and Diagnosis   Status/Progress: Based on the examination today, the patient's problem(s) is/are improved and well controlled.  New problems have not been presented today.   Co-morbidities are not complicating management of the primary condition.  There are no active rule-out diagnoses for  this patient at this time.     General Impression: Deneen Borrego is a 40 yo female who presents for follow up today. She is friendly and cooperative, smiling and happy mood.  Will continue current medications, see dosing below.       ICD-10-CM ICD-9-CM    1. Recurrent major depressive disorder, in partial remission  F33.41 296.35       2. Generalized anxiety disorder  F41.1 300.02             Intervention/Counseling/Treatment Plan   Medication Management: Continue current medications.  Continue Wellbutrin  mg PO daily  Continue Lamictal 100 mg PO daily  Discussed diagnosis, risk and benefits of proposed treatment above vs alternative treatment vs no treatment, and potential side effects of these treatments, and the inherent unpredictability of individual responses to these treatments. The patient expresses understanding and gives informed consent to pursue treatment at this time, believing that the potential benefits outweigh the potential risks. Patient has no other questions. Risks/adverse effects at this time include but are not limited to: GI side effects, sexual dysfunction, activation vs sedation, triggering of suicidal ideation, and serotonin syndrome.   Patient voices understanding and agreement with this plan  Provided crisis numbers  Encouraged patient to keep future appointments  Instruct patient to call or message with questions  In the event of an emergency, including suicidal ideation, patient was advised to go to the emergency room      Return to Clinic: 6 months, as needed    Total time: 15 minutes (which included pts differential diagnosis and prognosis for psychiatric conditions, risks, benefits of treatments, instructions and adherence to treatment plan, risk reduction, reviewing current psychiatric medication regimen, medical problems and social stressors. In addtion to possible discussion with other healthcare provider/s)    Add on Psychotherapy time: 3 minutes    Olga Lewis  LIONEL

## 2025-04-22 ENCOUNTER — PATIENT MESSAGE (OUTPATIENT)
Dept: FAMILY MEDICINE | Facility: CLINIC | Age: 40
End: 2025-04-22
Payer: COMMERCIAL

## 2025-04-22 ENCOUNTER — TELEPHONE (OUTPATIENT)
Dept: FAMILY MEDICINE | Facility: CLINIC | Age: 40
End: 2025-04-22
Payer: COMMERCIAL

## 2025-04-22 DIAGNOSIS — Z01.818 PREOPERATIVE GENERAL PHYSICAL EXAMINATION: Primary | ICD-10-CM

## 2025-04-22 NOTE — TELEPHONE ENCOUNTER
Spoke with patient in regards to message, patient said she having plastic surgery to have skin removed- surgery is schedule for 06/12- I advised patient to send clearance form over [employer] portal for you to review and I will call her her on tomorrow to schedule appt

## 2025-04-22 NOTE — TELEPHONE ENCOUNTER
----- Message from Shwetha sent at 4/22/2025  9:47 AM CDT -----  Type:  Sooner Apoointment RequestCaller is requesting a sooner appointment.  Caller declined first available appointment listed below.  Caller will not accept being placed on the waitlist and is requesting a message be sent to doctor.Name of Caller:pt DAVE, LEON DAHIANA [[id number]]When is the first available appointment?06/23/2025Symptoms:clearance for procedure Would the patient rather a call back or a response via [facility]? Call back Best Call Back Number:[phone] Additional Information: pt requesting a call back in regards to need sooner date for appt  clearance procedure the date of procedure is 06/12 pt has to have paperwork turned in before 05/12

## 2025-04-23 NOTE — TELEPHONE ENCOUNTER
Schedule patient for all fasting labs and CXR.    I will do EKG in office.    Schedule patient for 5/1/2025 at 2:30 slot.

## 2025-05-01 ENCOUNTER — OFFICE VISIT (OUTPATIENT)
Dept: FAMILY MEDICINE | Facility: CLINIC | Age: 40
End: 2025-05-01
Payer: COMMERCIAL

## 2025-05-01 VITALS
WEIGHT: 140.56 LBS | BODY MASS INDEX: 25.87 KG/M2 | SYSTOLIC BLOOD PRESSURE: 116 MMHG | OXYGEN SATURATION: 99 % | HEIGHT: 62 IN | HEART RATE: 87 BPM | DIASTOLIC BLOOD PRESSURE: 80 MMHG | TEMPERATURE: 99 F

## 2025-05-01 DIAGNOSIS — K76.89 FOCAL NODULAR HYPERPLASIA OF LIVER: ICD-10-CM

## 2025-05-01 DIAGNOSIS — Z01.818 PREOPERATIVE GENERAL PHYSICAL EXAMINATION: Primary | ICD-10-CM

## 2025-05-01 DIAGNOSIS — L40.9 PSORIASIS: ICD-10-CM

## 2025-05-01 DIAGNOSIS — F41.1 GENERALIZED ANXIETY DISORDER: ICD-10-CM

## 2025-05-01 DIAGNOSIS — Z86.39 HISTORY OF OBESITY: ICD-10-CM

## 2025-05-01 DIAGNOSIS — F33.41 RECURRENT MAJOR DEPRESSIVE DISORDER, IN PARTIAL REMISSION: ICD-10-CM

## 2025-05-01 DIAGNOSIS — E89.0 POSTOPERATIVE HYPOTHYROIDISM: ICD-10-CM

## 2025-05-01 DIAGNOSIS — L90.0 LICHEN SCLEROSUS ET ATROPHICUS: ICD-10-CM

## 2025-05-01 DIAGNOSIS — E89.0 S/P PARTIAL THYROIDECTOMY: ICD-10-CM

## 2025-05-01 DIAGNOSIS — M79.7 FIBROMYALGIA: ICD-10-CM

## 2025-05-01 PROCEDURE — 99214 OFFICE O/P EST MOD 30 MIN: CPT | Mod: S$GLB,,, | Performed by: NURSE PRACTITIONER

## 2025-05-01 PROCEDURE — 1160F RVW MEDS BY RX/DR IN RCRD: CPT | Mod: CPTII,S$GLB,, | Performed by: NURSE PRACTITIONER

## 2025-05-01 PROCEDURE — 3008F BODY MASS INDEX DOCD: CPT | Mod: CPTII,S$GLB,, | Performed by: NURSE PRACTITIONER

## 2025-05-01 PROCEDURE — 99999 PR PBB SHADOW E&M-EST. PATIENT-LVL IV: CPT | Mod: PBBFAC,,, | Performed by: NURSE PRACTITIONER

## 2025-05-01 PROCEDURE — 3079F DIAST BP 80-89 MM HG: CPT | Mod: CPTII,S$GLB,, | Performed by: NURSE PRACTITIONER

## 2025-05-01 PROCEDURE — 3044F HG A1C LEVEL LT 7.0%: CPT | Mod: CPTII,S$GLB,, | Performed by: NURSE PRACTITIONER

## 2025-05-01 PROCEDURE — 93010 ELECTROCARDIOGRAM REPORT: CPT | Mod: S$GLB,,, | Performed by: INTERNAL MEDICINE

## 2025-05-01 PROCEDURE — 93005 ELECTROCARDIOGRAM TRACING: CPT | Mod: S$GLB,,, | Performed by: NURSE PRACTITIONER

## 2025-05-01 PROCEDURE — 1159F MED LIST DOCD IN RCRD: CPT | Mod: CPTII,S$GLB,, | Performed by: NURSE PRACTITIONER

## 2025-05-01 PROCEDURE — 3074F SYST BP LT 130 MM HG: CPT | Mod: CPTII,S$GLB,, | Performed by: NURSE PRACTITIONER

## 2025-05-01 NOTE — PROGRESS NOTES
Subjective:       Patient ID: Deneen Borrego is a 39 y.o. female.    Chief Complaint: Pre-op Exam (Surgery on 06/12/2025)        HPI WITH ASSESSMENT AND PLAN OF CARE:      Patient is a 39 year old white female with Recurrent Depression and Anxiety treated by psychiatry, post-surgical hypothyroidism s/p partial right thyroidectomy, Fibromyalgia diagnosed by Internal Medicine NP 4/2020 (no rheumatology evaluation), Lichen Sclerosus/psoriasis followed by GYN MD, obesity, history of migraines, and  Focal Nodular Hyperplasia of Liver on MRI that is here today for PREOPERATIVE GENERAL PHYSICAL EXAM with PREOPERATIVE CLEARANCE.  Patient is scheduled for a MOMMY MAKEOVER EXTENDED TUMMY TUCK, BREAST LIFT SILICONE, EXTENDED BRACHIOPLASTY - FOR 6/12/2025 with Dr. Pamela Roy at [facility]    PREOPERATIVE LABS:  CBC WNL  CMP WNL  PT/INR pending  PTT WNL  HCG negative  HIV negative  TSH, T3 and T4 levels are all within normal range.    Preoperative EKG:  Normal Sinus Rhythm       Preoperative CXR:  Await radiology reading         CHRONIC PROBLEMS REVIEWED, UPDATED AND STABLE BELOW:    Focal nodular hyperplasia of liver   January 2025:  MRI confirmed that liver lesion is a focal nodular hyperplasia - this is a BENIGN condition of the liver that has NO malignant potential.  It is rarely symptomatic and surgical intervention is almost never required.        Recurrent Depression and Anxiety   treated by [facility] VALERIA Lewis - last visit 7/10/2024  Treated by [facility] - last visit 11/19/2024  Currently taking Wellbutrin  mg daily and Lamictal 100 mg daily  Prescribed Propranolol 10 mg TID prn anxiety  Stable.        Post-surgical hypothyroidism s/p partial right thyroidectomy  Right thyroidectomy due to nodule that was benign  Currently taking Levothyroxine 100mcg daily - taking on empty stomach in AM before any other food/drink or med.  Thyroid levels STABLE on current medication and  "dose             Fibromyalgia   diagnosed by Internal Medicine NP 4/2020 (no rheumatology evaluation)  Not on any medications.  Working on anti-inflammatory diet.  Stable.     Lichen Sclerosus/psoriasis   followed by GYN MD        History of Obesity  Body mass index is 25.71 kg/m².  Was on Tirzepatide compounded injections August 2023 - October 2024  Reports weight loss of 140 pounds total in past 3 years with diet and injections, 70 pounds was while on injections.  Keeping weight off since stopped medications.  Working on lifestyle modifications          Vitals:    05/01/25 1439   BP: 116/80   BP Location: Right arm   Patient Position: Sitting   Pulse: 87   Temp: 98.8 °F (37.1 °C)   TempSrc: Temporal   SpO2: 99%   Weight: 63.7 kg (140 lb 8.7 oz)   Height: 5' 2" (1.575 m)         Diagnoses this Encounter:         ICD-10-CM ICD-9-CM   1. Preoperative general physical examination  Z01.818 V72.83       Orders Placed This Encounter    Protime-INR    IN OFFICE EKG 12-LEAD (to Muse)           I spent a total of 35 minutes on the day of the visit.This includes face to face time and non-face to face time preparing to see the patient (eg, review of tests), obtaining and/or reviewing separately obtained history, documenting clinical information in the electronic or other health record, independently interpreting results and communicating results to the patient/family/caregiver, or care coordinator.     Patient's Medications   New Prescriptions    No medications on file   Previous Medications    BUPROPION (WELLBUTRIN XL) 300 MG 24 HR TABLET    Take 1 tablet (300 mg total) by mouth once daily.    ESTRADIOL (ESTRACE) 0.01 % (0.1 MG/GRAM) VAGINAL CREAM    Place 1 g vaginally once daily.    ESTRADIOL (ESTRACE) 1 MG TABLET    Take 1 tablet (1 mg total) by mouth once daily.    LAMOTRIGINE (LAMICTAL) 100 MG TABLET    Take 1 tablet (100 mg total) by mouth once daily.    LEVOTHYROXINE (SYNTHROID) 100 MCG TABLET    Take 1 tablet (100 " mcg total) by mouth before breakfast.    NYSTATIN-TRIAMCINOLONE (MYCOLOG) OINTMENT    Apply topically 2 (two) times daily.    TACROLIMUS (PROTOPIC) 0.1 % OINTMENT    Apply topically 2 (two) times daily.    TRIAMCINOLONE ACETONIDE 0.1% (KENALOG) 0.1 % CREAM    Apply topically 2 (two) times daily.   Modified Medications    No medications on file   Discontinued Medications    DESLORATADINE (CLARINEX) 5 MG TABLET    Take 1 tablet (5 mg total) by mouth once daily. for 7 days         Review of Systems   HENT: Negative.     Respiratory: Negative.     Cardiovascular: Negative.    Gastrointestinal: Negative.          Objective:        Physical Exam  Constitutional:       General: She is not in acute distress.     Appearance: Normal appearance. She is obese. She is not ill-appearing, toxic-appearing or diaphoretic.      Comments: Body mass index is 25.71 kg/m².           HENT:      Head: Normocephalic and atraumatic.      Right Ear: Tympanic membrane, ear canal and external ear normal. There is no impacted cerumen.      Left Ear: Tympanic membrane, ear canal and external ear normal. There is no impacted cerumen.      Nose: No congestion or rhinorrhea.      Mouth/Throat:      Pharynx: No oropharyngeal exudate.   Eyes:      General:         Right eye: No discharge.         Left eye: No discharge.      Extraocular Movements: Extraocular movements intact.      Conjunctiva/sclera: Conjunctivae normal.   Cardiovascular:      Rate and Rhythm: Normal rate and regular rhythm.      Heart sounds: Normal heart sounds. No murmur heard.  Pulmonary:      Effort: Pulmonary effort is normal. No respiratory distress.      Breath sounds: Normal breath sounds. No stridor. No wheezing, rhonchi or rales.   Abdominal:      General: Bowel sounds are normal. There is no distension.      Palpations: Abdomen is soft.      Tenderness: There is no abdominal tenderness. There is no guarding.   Musculoskeletal:         General: Normal range of motion.       Cervical back: Normal range of motion.      Right lower leg: No edema.      Left lower leg: No edema.   Lymphadenopathy:      Cervical: No cervical adenopathy.   Skin:     General: Skin is warm and dry.      Findings: No rash.   Neurological:      Mental Status: She is alert and oriented to person, place, and time.   Psychiatric:         Mood and Affect: Mood normal.         Behavior: Behavior normal.         Thought Content: Thought content normal.         Judgment: Judgment normal.             Past Medical History:   Diagnosis Date    Acute right lumbar radiculopathy 2019    Cervicalgia of [id number]-mqjjrxg-[id number] region     right worse than left; right handed    COVID-19 2021    Depression, major, recurrent     [facility] [place], first  closet encinas, suicidal    Episode of recurrent major depressive disorder 04/15/2020    Fibrocystic breast changes     Fibromyalgia     Focal nodular hyperplasia of liver 01/10/2025    MRI confirmed that liver lesion is a focal nodular hyperplasia - this is a BENIGN condition of the liver that has NO malignant potential.  It is rarely symptomatic and surgical intervention is almost never required.      Generalized anxiety disorder 04/15/2020    Hx of migraine headaches     Hypothyroidism, adult     TPO negative    LUCY (iron deficiency anemia)     Iron deficiency anemia, unspecified 04/15/2020    Lichen sclerosus et atrophicus     Morbid obesity 04/15/2020    Obesity, Class III, BMI 40-49.9 (morbid obesity)     max weight 291    Postoperative hypothyroidism 2022    Prediabetes     Psoriasis 04/15/2020    Psoriasis     Scoliosis of thoracolumbar region due to degenerative disease of spine in adult 04/15/2020    Severe anxiety with panic        Past Surgical History:   Procedure Laterality Date     SECTION N/A 2019    Procedure: REPEAT  SECTION;  Surgeon: Miko Albert MD;  Location: [facility];  Service: OB/GYN;  Laterality: N/A;      SECTION  2008    [place]    LAPAROSCOPIC CHOLECYSTECTOMY  2013    [place]    LAPAROSCOPIC SALPINGECTOMY Bilateral 2019    Procedure: SALPINGECTOMY, LAPAROSCOPIC;  Surgeon: Josiane Mills MD;  Location: [facility];  Service: OB/GYN;  Laterality: Bilateral;    LAPAROSCOPIC TOTAL HYSTERECTOMY N/A 2019    Procedure: HYSTERECTOMY, TOTAL, LAPAROSCOPIC;  Surgeon: Josiane Mills MD;  Location: [facility];  Service: OB/GYN;  Laterality: N/A;    PARTIAL HYSTERECTOMY  2019    [place], fibroid, heavy bleeding - still has ovaries    THYROIDECTOMY, PARTIAL Right     Sadiq, solid right nodule - benign on biopsy when removed    WISDOM TOOTH EXTRACTION         Family History   Problem Relation Name Age of Onset    Diabetes type II Mother      Diabetes Mother      Hyperlipidemia Father      Heart disease Father  73        AFIB    Hypertension Father      Colon polyps Father      Colon cancer Father      Cancer Father  60        colon cancer; stomach cancer    Breast cancer Paternal Aunt      Hypertension Paternal Aunt      Diabetes Paternal Aunt      Cancer Paternal Aunt      Cancer Paternal Uncle      Diabetes Paternal Uncle      Hypertension Paternal Uncle      Breast cancer Maternal Grandmother      Dementia Maternal Grandmother      Asthma Maternal Grandmother      Vision loss Maternal Grandmother      Arrhythmia Maternal Grandfather      Cancer Maternal Grandfather      Ovarian cancer Neg Hx      Cardiomyopathy Neg Hx      Congenital heart disease Neg Hx      Early death Neg Hx      Heart attacks under age 50 Neg Hx      Pacemaker/defibrilator Neg Hx         Social History     Socioeconomic History    Marital status:      Spouse name: Go osullivan    Number of children: 3    Highest education level: Bachelor's degree (e.g., BA, AB, BS)   Occupational History    Occupation: Lead Advocate supervisor     Comment: [employer]    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      Social Drivers of Health     Financial Resource Strain: Low Risk  (4/10/2025)    Overall Financial Resource Strain (CARDIA)     Difficulty of Paying Living Expenses: Not hard at all   Food Insecurity: No Food Insecurity (4/10/2025)    Hunger Vital Sign     Worried About Running Out of Food in the Last Year: Never true     Ran Out of Food in the Last Year: Never true   Transportation Needs: No Transportation Needs (4/10/2025)    PRAPARE - Transportation     Lack of Transportation (Medical): No     Lack of Transportation (Non-Medical): No   Physical Activity: Insufficiently Active (4/10/2025)    Exercise Vital Sign     Days of Exercise per Week: 3 days     Minutes of Exercise per Session: 40 min   Stress: Stress Concern Present (4/10/2025)    [place] [employer] - Occupational Stress Questionnaire     Feeling of Stress : Rather much   Housing Stability: Low Risk  (4/10/2025)    Housing Stability Vital Sign     Unable to Pay for Housing in the Last Year: No     Number of Times Moved in the Last Year: 0     Homeless in the Last Year: No

## 2025-05-03 LAB
OHS QRS DURATION: 80 MS
OHS QTC CALCULATION: 439 MS

## 2025-05-06 ENCOUNTER — TELEPHONE (OUTPATIENT)
Dept: FAMILY MEDICINE | Facility: CLINIC | Age: 40
End: 2025-05-06
Payer: COMMERCIAL

## 2025-05-06 NOTE — TELEPHONE ENCOUNTER
----- Message from Kena Barnhart NP sent at 5/1/2025  3:21 PM CDT -----  Regarding: preoperative clearance  Go ADDENDUM progress note with updated CXR and PT/INR report and fax clearance forms and call patient to  her copy

## 2025-05-07 ENCOUNTER — PATIENT MESSAGE (OUTPATIENT)
Dept: FAMILY MEDICINE | Facility: CLINIC | Age: 40
End: 2025-05-07
Payer: COMMERCIAL

## 2025-06-07 DIAGNOSIS — N89.8 VAGINAL DISCHARGE: ICD-10-CM

## 2025-06-10 RX ORDER — FLUCONAZOLE 150 MG/1
TABLET ORAL
Qty: 2 TABLET | Refills: 0 | OUTPATIENT
Start: 2025-06-10

## 2025-08-29 RX ORDER — ESTRADIOL 0.1 MG/G
1 CREAM VAGINAL DAILY
Qty: 42.5 G | Refills: 1 | Status: SHIPPED | OUTPATIENT
Start: 2025-08-29

## (undated) DEVICE — SOL WATER STRL IRR 1000ML

## (undated) DEVICE — SKIN STAPLER PMR35

## (undated) DEVICE — FORCEP DISSECTING LYONS PKS

## (undated) DEVICE — APPLICATOR CHLORAPREP ORN 26ML

## (undated) DEVICE — SUT 3/0 36IN COATED VICRYL

## (undated) DEVICE — PACK LAPAROSCOPY

## (undated) DEVICE — BLADE SURG CARBON STEEL #21

## (undated) DEVICE — TUBING LAPARSCOPIC INSUFFLATIN

## (undated) DEVICE — NDL INSUF ULTRA VERESS 120MM

## (undated) DEVICE — Device

## (undated) DEVICE — SUT 0 36IN PDS II VIO MONO

## (undated) DEVICE — PAD UNDERPAD 30X30

## (undated) DEVICE — DRESSING AQUACEL AG 3.5X10IN

## (undated) DEVICE — KITTNER ENDOSCOPIC SINGLE TIP

## (undated) DEVICE — SUT 2-0 VICRYL / CT-1

## (undated) DEVICE — DISSECTOR SONICISION CRV 39CM

## (undated) DEVICE — EVACUATOR KIT SMOKE PLUME AWAY

## (undated) DEVICE — ENDOSTITCH POLYSORB 0 ES-9

## (undated) DEVICE — TOWELS STERILE 18 X 25.5

## (undated) DEVICE — TROCAR KII FIOS ZTHREAD 11X100

## (undated) DEVICE — TRAY CATH FOLEY 16FR STAT LOC

## (undated) DEVICE — PAD SANITARY OB STERILE

## (undated) DEVICE — ENDOSTITCH INSTRUMENT

## (undated) DEVICE — SEE MEDLINE ITEM 152741

## (undated) DEVICE — SCISSOR 5MMX35CM DIRECT DRIVE

## (undated) DEVICE — ADHESIVE MASTISOL VIAL 48/BX

## (undated) DEVICE — SEE MEDLINE ITEM 157117

## (undated) DEVICE — SUT CHROMIC GUT 2-0 CT-1 27IN

## (undated) DEVICE — SEE MEDLINE ITEM 156931

## (undated) DEVICE — SOL NACL IRR 1000ML BTL

## (undated) DEVICE — IRRIGATOR ENDOSCOPY DISP.

## (undated) DEVICE — ELECTRODE REM PLYHSV RETURN 9

## (undated) DEVICE — NDL PNEUMOPERITONEUM 150MM

## (undated) DEVICE — SUT CTD VICRYL 0 UND BR CT